# Patient Record
Sex: FEMALE | Race: WHITE | NOT HISPANIC OR LATINO | Employment: FULL TIME | URBAN - METROPOLITAN AREA
[De-identification: names, ages, dates, MRNs, and addresses within clinical notes are randomized per-mention and may not be internally consistent; named-entity substitution may affect disease eponyms.]

---

## 2017-03-02 ENCOUNTER — OFFICE VISIT (OUTPATIENT)
Dept: ORTHOPEDICS | Facility: CLINIC | Age: 32
End: 2017-03-02
Payer: COMMERCIAL

## 2017-03-02 ENCOUNTER — HOSPITAL ENCOUNTER (OUTPATIENT)
Dept: RADIOLOGY | Facility: HOSPITAL | Age: 32
Discharge: HOME | End: 2017-03-02
Attending: ORTHOPAEDIC SURGERY
Payer: COMMERCIAL

## 2017-03-02 VITALS — HEART RATE: 89 BPM | DIASTOLIC BLOOD PRESSURE: 102 MMHG | SYSTOLIC BLOOD PRESSURE: 151 MMHG | WEIGHT: 293 LBS

## 2017-03-02 DIAGNOSIS — M25.561 RIGHT KNEE PAIN, UNSPECIFIED CHRONICITY: Primary | ICD-10-CM

## 2017-03-02 DIAGNOSIS — M25.561 RIGHT KNEE PAIN, UNSPECIFIED CHRONICITY: ICD-10-CM

## 2017-03-02 PROCEDURE — 1160F RVW MEDS BY RX/DR IN RCRD: CPT | Mod: S$GLB,,, | Performed by: PHYSICIAN ASSISTANT

## 2017-03-02 PROCEDURE — 73560 X-RAY EXAM OF KNEE 1 OR 2: CPT | Mod: TC,59,LT

## 2017-03-02 PROCEDURE — 73562 X-RAY EXAM OF KNEE 3: CPT | Mod: 26,RT,, | Performed by: RADIOLOGY

## 2017-03-02 PROCEDURE — 73560 X-RAY EXAM OF KNEE 1 OR 2: CPT | Mod: 26,59,LT, | Performed by: RADIOLOGY

## 2017-03-02 PROCEDURE — 99999 PR PBB SHADOW E&M-EST. PATIENT-LVL III: CPT | Mod: PBBFAC,,, | Performed by: PHYSICIAN ASSISTANT

## 2017-03-02 PROCEDURE — 99203 OFFICE O/P NEW LOW 30 MIN: CPT | Mod: S$GLB,,, | Performed by: PHYSICIAN ASSISTANT

## 2017-03-02 RX ORDER — IBUPROFEN 400 MG/1
400 TABLET ORAL EVERY 4 HOURS
COMMUNITY
End: 2017-04-13

## 2017-03-02 NOTE — MR AVS SNAPSHOT
[facility]  [address]  [facility] [address]  Phone: [phone]                  Jojo Boateng   3/2/2017 10:00 AM   Appointment    Description:  Female : 1985   Provider:  Julee Santoyo PA-C   Department:  [facility]                To Do List           Future Appointments        Provider Department Dept Phone    3/2/2017 10:00 AM LAURO De La Rosa [facility] [phone]    3/6/2017 5:45 PM [facility] [phone]      Goals (5 Years of Data)     None      [facility]     [facility] Care [facility] -  Assistance  Registered nurses in the [facility] provide clinical advisement, health education, appointment booking, and other advisory services.  Call for this free service at [phone].             Medications           Message regarding Medications     Verify the changes and/or additions to your medication regime listed below are the same as discussed with your clinician today.  If any of these changes or additions are incorrect, please notify your healthcare provider.             Verify that the below list of medications is an accurate representation of the medications you are currently taking.  If none reported, the list may be blank. If incorrect, please contact your healthcare provider. Carry this list with you in case of emergency.                Clinical Reference Information           Allergies as of 3/2/2017     Ultram [Tramadol]      Immunizations Administered on Date of Encounter - 3/2/2017     None      [facility] Sign-Up     Activating your [facility] account is as easy as 1-2-3!     1) Visit [email or web address], select Sign Up Now, enter this activation code and your date of birth, then select Next.  [email or web address]  Expires: 2017  7:55 AM      2) Create a username and password to use when you visit [facility] in the future and select a security question in case you lose  your password and select Next.    3) Enter your e-mail address and click Sign Up!    Additional Information  If you have questions, please e-mail [email or web address] or call [phone] to talk to our [facility] staff. Remember, [facility] is NOT to be used for urgent needs. For medical emergencies, dial 911.         Language Assistance Services     ATTENTION: Language assistance services are available, free of charge. Please call [phone].      ATENCIÓN: Si habla español, tiene a cline disposición servicios gratuitos de asistencia lingüística. Llame al [phone].     CHÚ Ý: N?u b?n nói Ti?ng Vi?t, có các d?ch v? h? tr? ngôn ng? mi?n phí dành cho b?n. G?i s? [phone].         [facility] complies with applicable Federal civil rights laws and does not discriminate on the basis of race, color, national origin, age, disability, or sex.

## 2017-03-06 ENCOUNTER — HOSPITAL ENCOUNTER (OUTPATIENT)
Dept: RADIOLOGY | Facility: HOSPITAL | Age: 32
Discharge: HOME | End: 2017-03-06
Attending: NURSE PRACTITIONER
Payer: COMMERCIAL

## 2017-03-06 DIAGNOSIS — M25.561 RIGHT KNEE PAIN: ICD-10-CM

## 2017-03-06 PROCEDURE — 73721 MRI JNT OF LWR EXTRE W/O DYE: CPT | Mod: TC,RT

## 2017-03-06 PROCEDURE — 73721 MRI JNT OF LWR EXTRE W/O DYE: CPT | Mod: 26,RT,, | Performed by: RADIOLOGY

## 2017-03-07 ENCOUNTER — TELEPHONE (OUTPATIENT)
Dept: ORTHOPEDICS | Facility: CLINIC | Age: 32
End: 2017-03-07

## 2017-03-07 NOTE — TELEPHONE ENCOUNTER
Spoke to patient regarding MRI results. Will refer her to sports medicine. Appt scheduled with Dr. Barclay on 4/5/17.

## 2017-03-20 ENCOUNTER — OFFICE VISIT (OUTPATIENT)
Dept: INTERNAL MEDICINE | Facility: CLINIC | Age: 32
End: 2017-03-20
Payer: COMMERCIAL

## 2017-03-20 VITALS
TEMPERATURE: 98 F | WEIGHT: 293 LBS | OXYGEN SATURATION: 96 % | DIASTOLIC BLOOD PRESSURE: 91 MMHG | HEART RATE: 115 BPM | BODY MASS INDEX: 45.99 KG/M2 | SYSTOLIC BLOOD PRESSURE: 134 MMHG | HEIGHT: 67 IN

## 2017-03-20 DIAGNOSIS — Z20.828 EXPOSURE TO THE FLU: ICD-10-CM

## 2017-03-20 DIAGNOSIS — R50.9 FEVER, UNSPECIFIED FEVER CAUSE: Primary | ICD-10-CM

## 2017-03-20 LAB
CTP QC/QA: YES
CTP QC/QA: YES
FLUAV AG NPH QL: NEGATIVE
FLUBV AG NPH QL: NEGATIVE
S PYO RRNA THROAT QL PROBE: NEGATIVE

## 2017-03-20 PROCEDURE — 99213 OFFICE O/P EST LOW 20 MIN: CPT | Mod: 25,S$GLB,, | Performed by: NURSE PRACTITIONER

## 2017-03-20 PROCEDURE — 87070 CULTURE OTHR SPECIMN AEROBIC: CPT

## 2017-03-20 PROCEDURE — 99999 PR PBB SHADOW E&M-EST. PATIENT-LVL IV: CPT | Mod: PBBFAC,,, | Performed by: NURSE PRACTITIONER

## 2017-03-20 PROCEDURE — 87147 CULTURE TYPE IMMUNOLOGIC: CPT

## 2017-03-20 PROCEDURE — 87804 INFLUENZA ASSAY W/OPTIC: CPT | Mod: QW,S$GLB,, | Performed by: NURSE PRACTITIONER

## 2017-03-20 PROCEDURE — 1160F RVW MEDS BY RX/DR IN RCRD: CPT | Mod: S$GLB,,, | Performed by: NURSE PRACTITIONER

## 2017-03-20 PROCEDURE — 87880 STREP A ASSAY W/OPTIC: CPT | Mod: QW,S$GLB,, | Performed by: NURSE PRACTITIONER

## 2017-03-20 RX ORDER — HYDROXYZINE HYDROCHLORIDE 10 MG/1
TABLET, FILM COATED ORAL
COMMUNITY
Start: 2017-02-07 | End: 2017-04-13 | Stop reason: CLARIF

## 2017-03-20 RX ORDER — FLUTICASONE PROPIONATE 50 MCG
SPRAY, SUSPENSION (ML) NASAL
COMMUNITY
Start: 2017-02-02 | End: 2018-08-06

## 2017-03-20 RX ORDER — HYDROXYZINE PAMOATE 25 MG/1
CAPSULE ORAL
COMMUNITY
Start: 2017-02-07 | End: 2017-04-13 | Stop reason: CLARIF

## 2017-03-20 RX ORDER — PROMETHAZINE HYDROCHLORIDE 25 MG/1
12.5-25 TABLET ORAL EVERY 6 HOURS PRN
Qty: 10 TABLET | Refills: 0 | Status: SHIPPED | OUTPATIENT
Start: 2017-03-20 | End: 2017-04-13 | Stop reason: CLARIF

## 2017-03-20 RX ORDER — OSELTAMIVIR PHOSPHATE 75 MG/1
75 CAPSULE ORAL 2 TIMES DAILY
Qty: 10 CAPSULE | Refills: 0 | Status: SHIPPED | OUTPATIENT
Start: 2017-03-20 | End: 2017-03-25

## 2017-03-20 RX ORDER — HYDROCHLOROTHIAZIDE 25 MG/1
TABLET ORAL
COMMUNITY
Start: 2017-02-02 | End: 2018-08-06

## 2017-03-20 NOTE — MEDICAL/APP STUDENT
Subjective:       Patient ID: Jojo Boateng is a 31 y.o. female.    Chief Complaint: URI    Ms. Boateng presents to the clinic for flu like symptoms that started yesterday. 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 She states that she has never felt this bad in her entire life.      Influenza   This is a new problem. The current episode started yesterday. The problem has been rapidly worsening. Associated symptoms include chills, congestion, coughing, diaphoresis, fatigue, a fever, headaches, myalgias, nausea, a sore throat, swollen glands and vomiting. Pertinent negatives include no abdominal pain, change in bowel habit, chest pain, neck pain, rash or urinary symptoms. She has tried NSAIDs (Aleve for fever) for the symptoms. The treatment provided mild relief.     Review of Systems   Constitutional: Positive for chills, diaphoresis, fatigue and fever.   HENT: Positive for congestion, ear pain, rhinorrhea, sinus pressure and sore throat.    Respiratory: Positive for cough. Negative for chest tightness, shortness of breath and wheezing.    Cardiovascular: Negative for chest pain.   Gastrointestinal: Positive for nausea and vomiting. Negative for abdominal pain, change in bowel habit, constipation and diarrhea.   Genitourinary: Negative for difficulty urinating.   Musculoskeletal: Positive for myalgias. Negative for neck pain.   Skin: Negative for rash.   Neurological: Positive for headaches.       Objective:      Physical Exam   Constitutional: She is oriented to person, place, and time. She appears well-developed.   HENT:   Head: Normocephalic and atraumatic.   Right Ear: Tympanic membrane is erythematous.   Nose: Mucosal edema present. Right sinus exhibits no maxillary sinus tenderness and no frontal sinus tenderness. Left sinus exhibits no maxillary sinus tenderness and no frontal sinus tenderness.   Mouth/Throat: Posterior  oropharyngeal edema and posterior oropharyngeal erythema present. No oropharyngeal exudate. Tonsils are 1+ on the right. Tonsils are 1+ on the left. Tonsillar exudate.   Left ear impacted with cerumen; unable to visualize TM   Eyes: Right eye exhibits no discharge. Left eye exhibits no discharge.   Neck: Neck supple. No JVD present.   Cardiovascular: Regular rhythm.  Tachycardia present.    Pulmonary/Chest: Effort normal. She has wheezes in the right upper field.   Lymphadenopathy:     She has cervical adenopathy.   Neurological: She is alert and oriented to person, place, and time.   Skin: Skin is dry and intact. There is erythema.   Psychiatric: She has a normal mood and affect. Her behavior is normal. Judgment and thought content normal.       Assessment:       1. Fever, unspecified fever cause    2. Exposure to the flu        Plan:       Jojo was seen today for uri.    Diagnoses and all orders for this visit:    Fever, unspecified fever cause  -     POCT Influenza A/B  -     POCT rapid strep A  -     oseltamivir (TAMIFLU) 75 MG capsule; Take 1 capsule (75 mg total) by mouth 2 (two) times daily.  -     Throat culture    Exposure to the flu  -     oseltamivir (TAMIFLU) 75 MG capsule; Take 1 capsule (75 mg total) by mouth 2 (two) times daily.  -     promethazine (PHENERGAN) 25 MG tablet; Take 0.5-1 tablets (12.5-25 mg total) by mouth every 6 (six) hours as needed for Nausea.

## 2017-03-20 NOTE — MR AVS SNAPSHOT
[facility]  [address]  [facility] [address]  Phone: [phone]  Fax: [phone]                  Jojo Boateng   3/20/2017 4:30 PM   Office Visit    Description:  Female : 1985   Provider:  Priscilla Patiño NP   Department:  [facility] - Internal Medicine           Reason for Visit     URI           Diagnoses this Visit        Comments    Fever, unspecified fever cause    -  Primary     Exposure to the flu                To Do List           Future Appointments        Provider Department Dept Phone    2017 1:00 PM Radha Barclay MD [facility] [phone]      Goals (5 Years of Data)     None       These Medications        Disp Refills Start End    oseltamivir (TAMIFLU) 75 MG capsule 10 capsule 0 3/20/2017 3/25/2017    Take 1 capsule (75 mg total) by mouth 2 (two) times daily. - Oral    Pharmacy: [facility] [address] [place] [address] [address] Ph #: [phone]         [facility]     [facility] Nurse Care Line -  Assistance  Registered nurses in the [facility] On Call Center provide clinical advisement, health education, appointment booking, and other advisory services.  Call for this free service at [phone].             Medications           Message regarding Medications     Verify the changes and/or additions to your medication regime listed below are the same as discussed with your clinician today.  If any of these changes or additions are incorrect, please notify your healthcare provider.        START taking these NEW medications        Refills    oseltamivir (TAMIFLU) 75 MG capsule 0    Sig: Take 1 capsule (75 mg total) by mouth 2 (two) times daily.    Class: Normal    Route: Oral      STOP taking these medications     ACETAMINOPHEN WITH CODEINE (TYLENOL-CODEINE #3 ORAL) Take by mouth.           Verify that the below list of medications is an accurate representation of the medications you are currently  "taking.  If none reported, the list may be blank. If incorrect, please contact your healthcare provider. Carry this list with you in case of emergency.           Current Medications     fluticasone (FLONASE) 50 mcg/actuation nasal spray     hydrochlorothiazide (HYDRODIURIL) 25 MG tablet     hydrOXYzine HCl (ATARAX) 10 MG Tab     hydrOXYzine pamoate (VISTARIL) 25 MG Cap     ibuprofen (ADVIL,MOTRIN) 400 MG tablet Take 400 mg by mouth every 4 (four) hours.    oseltamivir (TAMIFLU) 75 MG capsule Take 1 capsule (75 mg total) by mouth 2 (two) times daily.           Clinical Reference Information           Your Vitals Were     BP Pulse Temp Height Weight Last Period    134/91 115 98.1 °F (36.7 °C) (Oral) 5' 7" (1.702 m) 146.8 kg (323 lb 10.2 oz) 12/21/2016    SpO2 BMI             96% 50.69 kg/m2         Blood Pressure          Most Recent Value    BP  (!)  134/91      Allergies as of 3/20/2017     Ultram [Tramadol]      Immunizations Administered on Date of Encounter - 3/20/2017     None      Orders Placed During Today's Visit      Normal Orders This Visit    POCT Influenza A/B     POCT rapid strep A     Throat culture          3/20/2017  4:37 PM - Jn Marti MA      Component Results     Component Value Flag Ref Range Units Status    Rapid Strep A Screen Negative  Negative  Final     Acceptable Yes    Final            [facility] Sign-Up     Activating your [facility] account is as easy as 1-2-3!     1) Visit [email or web address], select Sign Up Now, enter this activation code and your date of birth, then select Next.  [email or web address]  Expires: 4/16/2017  8:55 AM      2) Create a username and password to use when you visit [facility] in the future and select a security question in case you lose your password and select Next.    3) Enter your e-mail address and click Sign Up!    Additional Information  If you have questions, please e-mail [email or web address] or call [phone] to talk to our [facility] " staff. Remember, [facility] is NOT to be used for urgent needs. For medical emergencies, dial 911.         Language Assistance Services     ATTENTION: Language assistance services are available, free of charge. Please call [phone].      ATENCIÓN: Si habla briseyda, tiene a cline disposición servicios gratuitos de asistencia lingüística. Llame al [phone].     PETRONA Ý: N?u b?n nói Ti?ng Vi?t, có các d?ch v? h? tr? ngôn ng? mi?n phí dành cho b?n. G?i s? [phone].         [facility] complies with applicable Federal civil rights laws and does not discriminate on the basis of race, color, national origin, age, disability, or sex.

## 2017-03-20 NOTE — LETTER
March 20, 2017                   [facility]  Internal Medicine  [address]  [facility] [address]  Phone: [phone]  Fax: [phone]   March 20, 2017     Patient: Jojo Boateng   YOB: 1985   Date of Visit: 3/20/2017       To Whom it May Concern:    Jojo Boateng was seen in my clinic on 3/20/2017. She may return to work on 3/24/17.    If you have any questions or concerns, please don't hesitate to call.    Sincerely,         Priscilla Patiño, NP

## 2017-03-20 NOTE — PROGRESS NOTES
I attest that this patient was seen and evaluated by SHABNAM Steel, then presented to me. I then examined the patient independently and together we agreed on a diagnosis and treatment plan which was then presented to the patient. See her note dated today for full visit information.    Subjective:        Patient ID: Jojo Boateng is a 31 y.o. female.     Chief Complaint: URI     Ms. Boateng presents to the clinic for flu like symptoms that started yesterday. 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 She states that she has never felt this bad in her entire life.      Influenza   This is a new problem. The current episode started yesterday. The problem has been rapidly worsening. Associated symptoms include chills, congestion, coughing, diaphoresis, fatigue, a fever, headaches, myalgias, nausea, a sore throat, swollen glands and vomiting. Pertinent negatives include no abdominal pain, change in bowel habit, chest pain, neck pain, rash or urinary symptoms. She has tried NSAIDs (Aleve for fever) for the symptoms. The treatment provided mild relief.      Review of Systems   Constitutional: Positive for chills, diaphoresis, fatigue and fever.   HENT: Positive for congestion, ear pain, rhinorrhea, sinus pressure and sore throat.   Respiratory: Positive for cough. Negative for chest tightness, shortness of breath and wheezing.   Cardiovascular: Negative for chest pain.   Gastrointestinal: Positive for nausea and vomiting. Negative for abdominal pain, change in bowel habit, constipation and diarrhea.   Genitourinary: Negative for difficulty urinating.   Musculoskeletal: Positive for myalgias. Negative for neck pain.   Skin: Negative for rash.   Neurological: Positive for headaches.       Objective:      Physical Exam   Constitutional: She is oriented to person, place, and time. She appears well-developed.   HENT:   Head: Normocephalic  and atraumatic.   Right Ear: Tympanic membrane is erythematous.   Nose: Mucosal edema present. Right sinus exhibits no maxillary sinus tenderness and no frontal sinus tenderness. Left sinus exhibits no maxillary sinus tenderness and no frontal sinus tenderness.   Mouth/Throat: Posterior oropharyngeal edema and posterior oropharyngeal erythema present. No oropharyngeal exudate. Tonsils are 1+ on the right. Tonsils are 1+ on the left. Tonsillar exudate.   Left ear impacted with cerumen; unable to visualize TM   Eyes: Right eye exhibits no discharge. Left eye exhibits no discharge.   Neck: Neck supple. No JVD present.   Cardiovascular: Regular rhythm. Tachycardia present.   Pulmonary/Chest: Effort normal. She has wheezes in the right upper field.   Lymphadenopathy:   She has cervical adenopathy.   Neurological: She is alert and oriented to person, place, and time.   Skin: Skin is dry and intact. There is erythema.   Psychiatric: She has a normal mood and affect. Her behavior is normal. Judgment and thought content normal.       Assessment:       1. Fever, unspecified fever cause    2. Exposure to the flu        Plan:       Jojo was seen today for uri.     Diagnoses and all orders for this visit:     Fever, unspecified fever cause  - POCT Influenza A/B  - POCT rapid strep A  - oseltamivir (TAMIFLU) 75 MG capsule; Take 1 capsule (75 mg total) by mouth 2 (two) times daily.  - Throat culture     Exposure to the flu  - oseltamivir (TAMIFLU) 75 MG capsule; Take 1 capsule (75 mg total) by mouth 2 (two) times daily.  - promethazine (PHENERGAN) 25 MG tablet; Take 0.5-1 tablets (12.5-25 mg total) by mouth every 6 (six) hours as needed for Nausea.

## 2017-03-21 ENCOUNTER — TELEPHONE (OUTPATIENT)
Dept: INTERNAL MEDICINE | Facility: CLINIC | Age: 32
End: 2017-03-21

## 2017-03-21 RX ORDER — AMOXICILLIN 875 MG/1
875 TABLET, FILM COATED ORAL EVERY 12 HOURS
Qty: 20 TABLET | Refills: 0 | Status: SHIPPED | OUTPATIENT
Start: 2017-03-21 | End: 2017-03-27

## 2017-03-21 NOTE — TELEPHONE ENCOUNTER
----- Message from Emily Abdi sent at 3/21/2017  2:58 PM CDT -----  Contact: Self/ [phone]   Type: Test Results    What test was performed? Throat culture    Who ordered the test? Dr. Patiño     When and where were the test performed?  Yesterday     Comments: pt is calling to see if the results are in for the test. Please call and advise     Thank you

## 2017-03-24 LAB — BACTERIA THROAT CULT: NORMAL

## 2017-03-27 ENCOUNTER — TELEPHONE (OUTPATIENT)
Dept: INTERNAL MEDICINE | Facility: CLINIC | Age: 32
End: 2017-03-27

## 2017-03-27 ENCOUNTER — OFFICE VISIT (OUTPATIENT)
Dept: INTERNAL MEDICINE | Facility: CLINIC | Age: 32
End: 2017-03-27
Payer: COMMERCIAL

## 2017-03-27 VITALS
WEIGHT: 293 LBS | TEMPERATURE: 98 F | OXYGEN SATURATION: 98 % | DIASTOLIC BLOOD PRESSURE: 76 MMHG | BODY MASS INDEX: 45.99 KG/M2 | HEART RATE: 108 BPM | SYSTOLIC BLOOD PRESSURE: 134 MMHG | HEIGHT: 67 IN

## 2017-03-27 DIAGNOSIS — H66.003 ACUTE SUPPURATIVE OTITIS MEDIA OF BOTH EARS WITHOUT SPONTANEOUS RUPTURE OF TYMPANIC MEMBRANES, RECURRENCE NOT SPECIFIED: Primary | ICD-10-CM

## 2017-03-27 DIAGNOSIS — J02.0 PHARYNGITIS DUE TO GROUP A BETA HEMOLYTIC STREPTOCOCCI: ICD-10-CM

## 2017-03-27 PROCEDURE — 99213 OFFICE O/P EST LOW 20 MIN: CPT | Mod: S$GLB,,, | Performed by: NURSE PRACTITIONER

## 2017-03-27 PROCEDURE — 1160F RVW MEDS BY RX/DR IN RCRD: CPT | Mod: S$GLB,,, | Performed by: NURSE PRACTITIONER

## 2017-03-27 PROCEDURE — 99999 PR PBB SHADOW E&M-EST. PATIENT-LVL IV: CPT | Mod: PBBFAC,,, | Performed by: NURSE PRACTITIONER

## 2017-03-27 RX ORDER — OSELTAMIVIR PHOSPHATE 75 MG/1
75 CAPSULE ORAL
COMMUNITY
End: 2017-03-27

## 2017-03-27 RX ORDER — AMOXICILLIN AND CLAVULANATE POTASSIUM 875; 125 MG/1; MG/1
1 TABLET, FILM COATED ORAL EVERY 12 HOURS
Qty: 14 TABLET | Refills: 0 | Status: SHIPPED | OUTPATIENT
Start: 2017-03-27 | End: 2017-04-13 | Stop reason: CLARIF

## 2017-03-27 NOTE — MR AVS SNAPSHOT
[facility] - Internal Medicine  [address]  [facility] [address]  Phone: [phone]  Fax: [phone]                  Jojo Boateng   3/27/2017 7:00 PM   Office Visit    Description:  Female : 1985   Provider:  Priscilla Patiño NP   Department:  [facility] - Internal Medicine           Reason for Visit     Ear Problem           Diagnoses this Visit        Comments    Acute suppurative otitis media of both ears without spontaneous rupture of tympanic membranes, recurrence not specified    -  Primary     Pharyngitis due to group A beta hemolytic Streptococci                To Do List           Future Appointments        Provider Department Dept Phone    3/27/2017 7:00 PM Priscilla Patiño NP [facility] Internal Medicine [phone]    2017 1:00 PM Radha Barclay MD [facility] - Sports Medicine [phone]      Goals (5 Years of Data)     None       These Medications        Disp Refills Start End    amoxicillin-clavulanate 875-125mg (AUGMENTIN) 875-125 mg per tablet 14 tablet 0 3/27/2017     Take 1 tablet by mouth every 12 (twelve) hours. - Oral    Pharmacy: [facility] [address] [place] [address] [address] Ph #: [phone]         [facility]     [facility] On Call Nurse Care Line -  Assistance  Registered nurses in the [facility] On Call Center provide clinical advisement, health education, appointment booking, and other advisory services.  Call for this free service at [phone].             Medications           Message regarding Medications     Verify the changes and/or additions to your medication regime listed below are the same as discussed with your clinician today.  If any of these changes or additions are incorrect, please notify your healthcare provider.        START taking these NEW medications        Refills    amoxicillin-clavulanate 875-125mg (AUGMENTIN) 875-125 mg per tablet 0    Sig: Take 1 tablet by mouth every 12 (twelve) hours.    Class: Normal  "   Route: Oral      STOP taking these medications     amoxicillin (AMOXIL) 875 MG tablet Take 1 tablet (875 mg total) by mouth every 12 (twelve) hours.    oseltamivir (TAMIFLU) 75 MG capsule Take 75 mg by mouth.           Verify that the below list of medications is an accurate representation of the medications you are currently taking.  If none reported, the list may be blank. If incorrect, please contact your healthcare provider. Carry this list with you in case of emergency.           Current Medications     amoxicillin-clavulanate 875-125mg (AUGMENTIN) 875-125 mg per tablet Take 1 tablet by mouth every 12 (twelve) hours.    fluticasone (FLONASE) 50 mcg/actuation nasal spray     hydrochlorothiazide (HYDRODIURIL) 25 MG tablet     hydrOXYzine HCl (ATARAX) 10 MG Tab     hydrOXYzine pamoate (VISTARIL) 25 MG Cap     ibuprofen (ADVIL,MOTRIN) 400 MG tablet Take 400 mg by mouth every 4 (four) hours.    promethazine (PHENERGAN) 25 MG tablet Take 0.5-1 tablets (12.5-25 mg total) by mouth every 6 (six) hours as needed for Nausea.           Clinical Reference Information           Your Vitals Were     BP Pulse Temp Height Weight Last Period    134/76 108 98.3 °F (36.8 °C) (Oral) 5' 7" (1.702 m) 145.7 kg (321 lb 3.4 oz) 12/21/2016    SpO2 BMI             98% 50.31 kg/m2         Blood Pressure          Most Recent Value    BP  134/76      Allergies as of 3/27/2017     Ultram [Tramadol]      Immunizations Administered on Date of Encounter - 3/27/2017     None      [facility] Sign-Up     Activating your [facility] account is as easy as 1-2-3!     1) Visit [email or web address], select Sign Up Now, enter this activation code and your date of birth, then select Next.  [email or web address]  Expires: 4/16/2017  8:55 AM      2) Create a username and password to use when you visit [facility] in the future and select a security question in case you lose your password and select Next.    3) Enter your e-mail address and click Sign Up!    Additional " Information  If you have questions, please e-mail [email or web address] or call [phone] to talk to our [facility] staff. Remember, [facility] is NOT to be used for urgent needs. For medical emergencies, dial 911.         Language Assistance Services     ATTENTION: Language assistance services are available, free of charge. Please call [phone].      ATENCIÓN: Si habla español, tiene a cline disposición servicios gratuitos de asistencia lingüística. Llame al [phone].     [facility] Ý: N?u b?n nói Ti?ng Vi?t, có các d?ch v? h? tr? ngôn ng? mi?n phí dành cho b?n. G?i s? [phone].         [facility] complies with applicable Federal civil rights laws and does not discriminate on the basis of race, color, national origin, age, disability, or sex.

## 2017-03-27 NOTE — TELEPHONE ENCOUNTER
Please let Ms Boateng know they culture did come back positive for strep.  The amoxicillin she started last week should be effective.  She should take all of it as prescribed.

## 2017-03-27 NOTE — PROGRESS NOTES
Subjective:       Patient ID: Jojo Boateng is a 31 y.o. female.    Chief Complaint: Ear Problem    Ms Boateng presents today for ongoing ear pain, pressure and diminished hearing, all on the left side, since Friday. She is on Amoxil 875 BID for strep throat and has been using Flonase for allergies.     Otalgia    There is pain in the left ear. This is a new problem. The current episode started in the past 7 days. The problem occurs constantly. The problem has been gradually worsening. The maximum temperature recorded prior to her arrival was 101 - 101.9 F. The fever has been present for 1 to 2 days. The patient is experiencing no pain. Associated symptoms include headaches, hearing loss and a sore throat. Pertinent negatives include no abdominal pain, coughing, diarrhea, ear discharge, neck pain, rash, rhinorrhea or vomiting. She has tried antibiotics for the symptoms. The treatment provided no relief.     Review of Systems   Constitutional: Positive for fatigue and fever. Negative for chills and diaphoresis.   HENT: Positive for ear pain, hearing loss and sore throat. Negative for ear discharge and rhinorrhea.    Eyes: Negative for visual disturbance.   Respiratory: Negative for cough.    Gastrointestinal: Negative for abdominal pain, diarrhea and vomiting.   Genitourinary: Negative for dysuria.   Musculoskeletal: Negative for neck pain.   Skin: Negative for rash.   Neurological: Positive for headaches.   Psychiatric/Behavioral: Negative for confusion.       Objective:      Physical Exam   Constitutional: She is oriented to person, place, and time. She appears well-developed. No distress.   Morbidly obese   HENT:   Head: Normocephalic and atraumatic.   Right Ear: Tympanic membrane is erythematous. A middle ear effusion is present.   Left Ear: Tympanic membrane is erythematous and bulging. A middle ear effusion is present.   Nose: No mucosal edema or rhinorrhea. Right sinus exhibits no maxillary sinus tenderness  and no frontal sinus tenderness. Left sinus exhibits no maxillary sinus tenderness and no frontal sinus tenderness.   Mouth/Throat: No oropharyngeal exudate. Tonsils are 0 on the right. Tonsils are 0 on the left. No tonsillar exudate.   Neck: Normal range of motion. Neck supple.   Cardiovascular: Normal rate, regular rhythm and normal heart sounds.    Pulmonary/Chest: Effort normal and breath sounds normal. No respiratory distress. She has no wheezes. She has no rales.   Lymphadenopathy:     She has no cervical adenopathy.   Neurological: She is alert and oriented to person, place, and time.   Skin: Skin is warm and dry. She is not diaphoretic.   Psychiatric: She has a normal mood and affect. Her behavior is normal.   Nursing note and vitals reviewed.      Assessment:       1. Acute suppurative otitis media of both ears without spontaneous rupture of tympanic membranes, recurrence not specified    2. Pharyngitis due to group A beta hemolytic Streptococci        Plan:   1. Acute suppurative otitis media of both ears without spontaneous rupture of tympanic membranes, recurrence not specified  - amoxicillin-clavulanate 875-125mg (AUGMENTIN) 875-125 mg per tablet; Take 1 tablet by mouth every 12 (twelve) hours.  Dispense: 14 tablet; Refill: 0    2. Pharyngitis due to group A beta hemolytic Streptococci  - amoxicillin-clavulanate 875-125mg (AUGMENTIN) 875-125 mg per tablet; Take 1 tablet by mouth every 12 (twelve) hours.  Dispense: 14 tablet; Refill: 0      Pt has been given instructions populated from [employer] database and has verbalized understanding of the after visit summary and information contained wherein.    Follow up with a primary care provider. May go to ER for acute shortness of breath, lightheadedness, fever, or any other emergent complaints or changes in condition.

## 2017-04-05 ENCOUNTER — OFFICE VISIT (OUTPATIENT)
Dept: SPORTS MEDICINE | Facility: CLINIC | Age: 32
End: 2017-04-05
Payer: COMMERCIAL

## 2017-04-05 VITALS
SYSTOLIC BLOOD PRESSURE: 145 MMHG | BODY MASS INDEX: 45.99 KG/M2 | WEIGHT: 293 LBS | HEIGHT: 67 IN | DIASTOLIC BLOOD PRESSURE: 98 MMHG | HEART RATE: 107 BPM

## 2017-04-05 DIAGNOSIS — S83.8X1A ACUTE LATERAL MENISCAL INJURY OF RIGHT KNEE, INITIAL ENCOUNTER: ICD-10-CM

## 2017-04-05 DIAGNOSIS — M23.91 KNEE INTERNAL DERANGEMENT, RIGHT: Primary | ICD-10-CM

## 2017-04-05 DIAGNOSIS — M23.91 DERANGEMENT, KNEE INTERNAL, RIGHT: ICD-10-CM

## 2017-04-05 DIAGNOSIS — M94.261 CHONDROMALACIA OF RIGHT KNEE: ICD-10-CM

## 2017-04-05 DIAGNOSIS — S83.281A TEAR OF LATERAL CARTILAGE OR MENISCUS OF KNEE, CURRENT, RIGHT, INITIAL ENCOUNTER: Primary | ICD-10-CM

## 2017-04-05 PROCEDURE — 1160F RVW MEDS BY RX/DR IN RCRD: CPT | Mod: S$GLB,,, | Performed by: ORTHOPAEDIC SURGERY

## 2017-04-05 PROCEDURE — 99204 OFFICE O/P NEW MOD 45 MIN: CPT | Mod: S$GLB,,, | Performed by: ORTHOPAEDIC SURGERY

## 2017-04-05 PROCEDURE — 99999 PR PBB SHADOW E&M-EST. PATIENT-LVL IV: CPT | Mod: PBBFAC,,, | Performed by: ORTHOPAEDIC SURGERY

## 2017-04-05 RX ORDER — LORATADINE 10 MG/1
10 TABLET ORAL DAILY
COMMUNITY
End: 2018-08-06

## 2017-04-05 NOTE — PROGRESS NOTES
Subjective:          Chief Complaint: Jojo Boateng is a 31 y.o. female who had concerns including Pain of the Right Knee.    HPI Comments: Patient is here for an evaluation of her right knee. She was squatting at [employer] and felt a pop. She has had increased pain and swelling since then. She was seen by Julee Santoyo and had an MRI.     Handedness: right-handed  Sport played:    Level:            Pain   This is a new problem. The current episode started more than 1 month ago. Pertinent negatives include no chest pain, chills, diaphoresis, joint swelling, myalgias, nausea, numbness, rash, vomiting or weakness.       Review of Systems   Constitution: Negative for chills, decreased appetite, diaphoresis, weakness, malaise/fatigue, night sweats, weight gain and weight loss.   Eyes: Negative for blurred vision, double vision and pain.   Cardiovascular: Negative for chest pain, cyanosis, dyspnea on exertion, leg swelling, orthopnea and palpitations.   Respiratory: Negative for hemoptysis, shortness of breath and wheezing.    Skin: Negative for color change and rash.   Musculoskeletal: Positive for joint pain. Negative for arthritis, back pain, falls, gout, joint swelling, muscle cramps, muscle weakness, myalgias and stiffness.   Gastrointestinal: Negative for hematemesis, hematochezia, melena, nausea and vomiting.   Genitourinary: Negative for dysuria, frequency and hematuria.   Neurological: Negative for dizziness, light-headedness, loss of balance and numbness.   Psychiatric/Behavioral: Negative for altered mental status, depression and memory loss. The patient does not have insomnia and is not nervous/anxious.        Pain Related Questions  Over the past 3 days, what was your average pain during activity? (I.e. running, jogging, walking, climbing stairs, getting dressed, ect.): 7  Over the past 3 days, what was your highest pain level?: 7  Over the past 3 days, what was your lowest pain level? : 7    Other  How  many nights a week are you awakened by your affected body part?: 2  Was the patient's HEIGHT measured or patient reported?: Patient Reported  Was the patient's WEIGHT measured or patient reported?: Measured      Objective:        General: Jojo is well-developed, well-nourished, appears stated age, in no acute distress, alert and oriented to time, place and person.     General    Vitals reviewed.  Constitutional: She is oriented to person, place, and time. She appears well-developed and well-nourished. No distress.   HENT:   Mouth/Throat: No oropharyngeal exudate.   Eyes: Right eye exhibits no discharge. Left eye exhibits no discharge.   Neck: Normal range of motion.   Pulmonary/Chest: Effort normal and breath sounds normal. No respiratory distress.   Neurological: She is alert and oriented to person, place, and time. She has normal reflexes. No cranial nerve deficit. Coordination normal.   Psychiatric: She has a normal mood and affect. Her behavior is normal. Judgment and thought content normal.     General Musculoskeletal Exam   Gait: antalgic and abnormal       Right Knee Exam     Inspection   Erythema: absent  Scars: absent  Swelling: present  Effusion: effusion  Deformity: deformity  Bruising: absent    Tenderness   The patient is tender to palpation of the lateral joint line.    Range of Motion   Extension: 0   Flexion: 90     Tests   Meniscus   Agapito:  Medial - negative Lateral - positive  Ligament Examination Lachman: normal (-1 to 2mm) PCL-Posterior Drawer: normal (0 to 2mm)     MCL - Valgus: normal (0 to 2mm)  LCL - Varus: normalPivot Shift: normal (Equal)Reverse Pivot Shift: normal (Equal)Dial Test at 30 degrees: normal (< 5 degrees)Dial Test at 90 degrees: normal (< 5 degrees)  Posterior Sag Test: negative  Posterolateral Corner: unstable (>15 degrees difference)  Patella   Patellar Apprehension: negative  Passive Patellar Tilt: neutral  Patellar Tracking: normal  Patellar Glide (quadrants): Lateral  - 1   Medial - 2  Q-Angle at 90 degrees: normal  Patellar Grind: negative  J-Sign: none    Other   Meniscal Cyst: absent  Popliteal (Baker's) Cyst: absent  Sensation: normal    Left Knee Exam     Inspection   Erythema: absent  Scars: absent  Swelling: absent  Effusion: absent  Deformity: deformity  Bruising: absent    Tenderness   The patient is experiencing no tenderness.         Range of Motion   Extension: 0   Flexion: 140     Tests   Meniscus   Agapito:  Medial - negative Lateral - negative  Stability Lachman: normal (-1 to 2mm) PCL-Posterior Drawer: normal (0 to 2mm)  MCL - Valgus: normal (0 to 2mm)  LCL - Varus: normal (0 to 2mm)Pivot Shift: normal (Equal)Reverse Pivot Shift: normal (Equal)Dial Test at 30 degrees: normal (< 5 degrees)Dial Test at 90 degrees: normal (< 5 degrees)  Posterior Sag Test: negative  Posterolateral Corner: unstable (>15 degrees difference)  Patella   Patellar Apprehension: negative  Passive Patellar Tilt: neutral  Patellar Tracking: normal  Patellar Glide (Quadrants): Lateral - 1 Medial - 2  Q-Angle at 90 degrees: normal  Patellar Grind: negative  J-Sign: J sign absent    Other   Meniscal Cyst: absent  Popliteal (Baker's) Cyst: absent  Sensation: normal    Right Hip Exam     Tests   Trupti: negative  Left Hip Exam     Tests   Trupti: negative          Reflexes     Left Side  Quadriceps:  2+  Achilles:  2+    Right Side   Quadriceps:  2+  Achilles:  2+    Vascular Exam     Right Pulses  Dorsalis Pedis:      2+  Posterior Tibial:      2+        Left Pulses  Dorsalis Pedis:      2+  Posterior Tibial:      2+            MRI RESULTS  Menisci:  Medial meniscus is intact.  There is truncation of the free edge of the body segment of the lateral meniscus with mild increased signal extending to the superior articular surface suggesting meniscal tear.     Ligaments:  ACL, PCL, MCL, and LCL complex are intact.    Tendons:  Extensor mechanism is maintained.    Cartilage:   Patellofemoral: Minimal  partial-thickness cartilage fissuring visualized over the median patellar eminence and central trochlear cartilage.  Medial tibiofemoral: Articular cartilage is maintained.  Lateral tibiofemoral: Small area of partial to full-thickness cartilage loss involving the central weightbearing portion of the lateral femoral condyle with mild underlying subchondral edema.    Bone: No fracture or marrow replacing process.  Mild marginal osteophytes involving the lateral compartment.    Miscellaneous: There is no joint effusion.        Assessment:       Encounter Diagnoses   Name Primary?    Knee internal derangement, right Yes    Acute lateral meniscal injury of right knee, initial encounter     Chondromalacia of right knee           Plan:         1. IKDC, SF-12 and KOOS was filled out today in clinic.     RTC in 3 weeks with Mid-level provider for pre-operative history and physical. Patient will not fill out IKDC, SF-12 and KOOS on return.    2. We reviewed with Jojo today, the pathology and natural history of her diagnosis. We have discussed a variety of treatment options including medications, physical therapy and other alternative treatments. I also explained the indications, risks and benefits of surgery. After discussion, Jojo decided to proceed with surgery. The decision was made to go forward with   1. Right knee lateral menisectomy   2. Right knee arthroscopic chondroplasty  3. Right knee arthroscopic synovectomy  4. Amniox Arthrocentesis, [id number]    The details of the surgical procedure were explained, including the location of probable incisions and a description of likely hardware and/or grafts to be used.  The patient understands the likely convalescence after surgery.  Also, we have thoroughly discussed the risks, benefits and alternatives to surgery, including, but not limited to, the risk of infection, joint stiffness, blood clot (including DVT and/or pulmonary embolus), neurologic and vascular injury.   It was explained that, if tissue has been repaired or reconstructed, there is a chance of failure, which may require further management.      All of the patient's questions were answered and informed consent was obtained. The patient will contact us if they have any questions or concerns in the interim.    3. Pre-operative physical therapy - Dea Termin - DPT    4. 70021 - Idris Gates, performed a custom orthotic / brace adjustment, fitting and training with the patient. The patient demonstrated understanding and proper care. This was performed for 15 minutes. OSSUR  one plus Right knee    5. Preoperative clearance Dr. Natanael Elias patient questionnaires have been collected today.

## 2017-04-05 NOTE — MR AVS SNAPSHOT
[facility]  [address]  [facility] [address]  Phone: [phone]                  Jojo Boateng   2017 1:00 PM   Office Visit    Description:  Female : 1985   Provider:  Radha Barclay MD   Department:  [facility]           Reason for Visit     Right Knee - Pain           Diagnoses this Visit        Comments    Knee internal derangement, right    -  Primary     Acute lateral meniscal injury of right knee, initial encounter         Chondromalacia of right knee                To Do List           Goals (5 Years of Data)     None      Follow-Up and Disposition     Return in about 3 weeks (around 2017), or RTC in 3 weeks with Mid-level provider for pre-operative history and physical. Patient will not fill, for RTC in 3 weeks with Mid-level provider for pre-operative history and physical. Patient will not fill.      [facility] On Call     [facility] On Call Nurse Care Line -  Assistance  Unless otherwise directed by your provider, please contact [facility] On-Call, our nurse care line that is available for  assistance.     Registered nurses in the [facility] On Call Center provide: appointment scheduling, clinical advisement, health education, and other advisory services.  Call: [phone] (toll free)               Medications           Message regarding Medications     Verify the changes and/or additions to your medication regime listed below are the same as discussed with your clinician today.  If any of these changes or additions are incorrect, please notify your healthcare provider.             Verify that the below list of medications is an accurate representation of the medications you are currently taking.  If none reported, the list may be blank. If incorrect, please contact your healthcare provider. Carry this list with you in case of emergency.           Current Medications     fluticasone (FLONASE) 50 mcg/actuation nasal spray      "hydrochlorothiazide (HYDRODIURIL) 25 MG tablet     ibuprofen (ADVIL,MOTRIN) 400 MG tablet Take 400 mg by mouth every 4 (four) hours.    loratadine (CLARITIN) 10 mg tablet Take 10 mg by mouth once daily.    amoxicillin-clavulanate 875-125mg (AUGMENTIN) 875-125 mg per tablet Take 1 tablet by mouth every 12 (twelve) hours.    hydrOXYzine HCl (ATARAX) 10 MG Tab     hydrOXYzine pamoate (VISTARIL) 25 MG Cap     promethazine (PHENERGAN) 25 MG tablet Take 0.5-1 tablets (12.5-25 mg total) by mouth every 6 (six) hours as needed for Nausea.           Clinical Reference Information           Your Vitals Were     BP Pulse Height Weight Last Period BMI    145/98 107 5' 7" (1.702 m) 145.6 kg (321 lb) 12/21/2016 50.28 kg/m2      Blood Pressure          Most Recent Value    BP  (!)  145/98      Allergies as of 4/5/2017     Ultram [Tramadol]      Immunizations Administered on Date of Encounter - 4/5/2017     None      Orders Placed During Today's Visit      Normal Orders This Visit    Ambulatory Referral to Physical/Occupational Therapy       [facility] Sign-Up     Activating your [facility] account is as easy as 1-2-3!     1) Visit [email or web address], select Sign Up Now, enter this activation code and your date of birth, then select Next.  [email or web address]  Expires: 4/16/2017  8:55 AM      2) Create a username and password to use when you visit [facility] in the future and select a security question in case you lose your password and select Next.    3) Enter your e-mail address and click Sign Up!    Additional Information  If you have questions, please e-mail [email or web address] or call [phone] to talk to our [facility] staff. Remember, [facility] is NOT to be used for urgent needs. For medical emergencies, dial 911.         Instructions      Treating Meniscus Problems  The type of surgery you have depends on the nature of your tear. its size and location. Your surgeon may use arthroscopy, a method that involves putting a tiny camera " inside your knee, so that your doctor can clearly see your joint. Arthroscopy requires only small incisions (cuts), and you can usually go home the same day as surgery. During surgery, you may have local anesthesia (your doctor numbs your knee with medicine), a regional block (numbing your body from the waist down), or general anesthesia (your doctor gives you drugs to put you into a deep sleep so you do't feel pain.)  Pre-op checklist  · Don't eat or drink 10 hours before surgery.  · Arrange for someone to drive you home after surgery.  · Tell your doctor if you take any medicine, supplements, or herbal remedies.        Repair  For certain tears, your surgeon will try to repair the meniscus. He or she will sew torn edges so they can heal properly. Or your surgeon will use special fasteners to repair damage. In some cases, repairs may require another incision at the back or side of your knee.       Removal  In most cases, your surgeon will remove the damaged part of your meniscus. The meniscus won't completely grow back, so your doctor will remove as little tissue as possible. Other tissue, called the articular cartilage, will take over the role as shock absorber for your knee joint.       After surgery  You'll spend some time in the recovery area and can go home when you've recovered from the anesthesia. Your knee will be bandaged, and you may have stitches, steri-strips, or staples. You may need crutches to keep weight off the knee and may have a splint for support.  Date Last Reviewed: 9/4/2015 © 2000-2016 [employer]. [address] [place] [address], [place] [address]. All rights reserved. This information is not intended as a substitute for professional medical care. Always follow your healthcare professional's instructions.        Knee Arthroscopy  Knee problems can often be diagnosed and treated with a technique called arthroscopy. This type of surgery is done using an instrument called an arthroscope  (scope). Only a few small incisions are needed for this surgery. The procedure can be used to diagnose a knee problem. In many cases, treatment can also be done using arthroscopy.     Insertion of fluid, arthroscope, and instruments through small incisions (portals).         Patient undergoes procedure      The arthroscope  The scope allows the doctor to look directly into the knee joint. It is about the size of a pencil and contains a pathway for fluids. It also contains coated glass fibers that beam an intense, cool light into the knee joint. A camera is attached to the scope as well. It provides clear images of most areas in your knee joint. The doctor views these images on a monitor.  Preparing for the procedure  · Have lab or other testing done as advised.  · Tell your doctor about any medicines or supplements you take  · Do not eat or drink anything for 10 hours before the procedure.  · Once you arrive for surgery, you will be given an IV line in your arm or hand. This provides fluids and medicines.  · To keep you free of pain during the surgery, youll receive medicine called anesthesia. You may have:  ¨ General anesthesia. This puts you into a deep sleep during the surgery.  ¨ Regional anesthesia. This numbs the body from the waist down.  ¨ Local anesthesia. This numbs just the knee.  In addition to regional or local anesthesia, you may receive sedation. This medicine makes you relaxed and sleepy during the surgery.  The procedure  · A few small incisions (portals) are made in your knee.  · The scope is inserted through one of the portals.  · Sterile fluid is put into the knee joint. This makes it easier to see and work inside your joint.  · Using the scope, the doctor confirms the type and degree of knee damage. If possible, the problem is treated at this time. This is done using surgical tools put through the other portals.  · When the surgery is done, all tools are removed. The incisions are closed with  sutures, staples, surgical glue, or strips of surgical tape.  Risks and complications of arthroscopy  All surgeries have risks. The risks of arthroscopy include:  · Bleeding  · Infection  · Blood clots  · Swelling and stiffness of the knee  · Injury to normal tissue  · Continuing knee problems   Date Last Reviewed: 9/20/2015  © 1859-5557 [employer]. [address] [place] [address]. All rights reserved. This information is not intended as a substitute for professional medical care. Always follow your healthcare professional's instructions.        Knee Arthroscopy: Conditions Treated  Arthroscopy is used to find and treat many types of knee problems. These include tears in the meniscal cartilage, joint loose bodies, or anterior cruciate ligament (ACL) tears.     Damaged meniscal cartilage is removed.   Meniscal cartilage tears  There are several types of meniscal cartilage tears. The meniscal cartilage attaches on the tibia (shinbone) and acts as a shock absorber for the knee. Your surgery will depend on the type and extent of your injury. Your surgeon can remove the damaged tissue or fix the tear. Treatment should ease the pain and swelling. It can also help keep the joint from locking.     To replace damaged ACL tissue, a graft of strong, healthy tissue is attached.   ACL tears  A torn ACL (anterior cruciate ligament) can make the knee unstable. You may have pain and swelling, and your knee may give out. Your surgeon can repair the ACL by reconstructing it. To rebuild your ACL, damaged tissue may be replaced with a graft of healthy tissue from an area near your knee, or from a donor.     Date Last Reviewed: 8/31/2015 © 2000-2016 [employer]. [address] [place] [address]. All rights reserved. This information is not intended as a substitute for professional medical care. Always follow your healthcare professional's instructions.             Language Assistance  Services     ATTENTION: Language assistance services are available, free of charge. Please call [phone].      ATENCIÓN: Si habla briseyda, tiene a cline disposición servicios gratuitos de asistencia lingüística. Llame al [phone].     CHÚ Ý: N?u b?n nói Ti?ng Vi?t, có các d?ch v? h? tr? ngôn ng? mi?n phí dành cho b?n. G?i s? [phone].         [facility] complies with applicable Federal civil rights laws and does not discriminate on the basis of race, color, national origin, age, disability, or sex.

## 2017-04-05 NOTE — LETTER
April 5, 2017        Julee Santoyo PA-C  [address]  [facility] [address]             [facility]  [address]  [facility] [address]  Phone: [phone]   Patient: Jojo Boateng   MR Number: [id number]   YOB: 1985   Date of Visit: 4/5/2017       Dear Dr. Santoyo:    Thank you for referring Jojo Boateng to me for evaluation. Below are the relevant portions of my assessment and plan of care.       Encounter Diagnoses   Name Primary?    Knee internal derangement, right Yes    Acute lateral meniscal injury of right knee, initial encounter     Chondromalacia of right knee              1. IKDC, SF-12 and KOOS was filled out today in clinic.     RTC in 3 weeks with Mid-level provider for pre-operative history and physical. Patient will not fill out IKDC, SF-12 and KOOS on return.    2. We reviewed with Jojo today, the pathology and natural history of her diagnosis. We have discussed a variety of treatment options including medications, physical therapy and other alternative treatments. I also explained the indications, risks and benefits of surgery. After discussion, Jojo decided to proceed with surgery. The decision was made to go forward with   1. Right knee lateral menisectomy   2. Right knee arthroscopic chondroplasty  3. Right knee arthroscopic synovectomy  4. Amniox Arthrocentesis, 08182    The details of the surgical procedure were explained, including the location of probable incisions and a description of likely hardware and/or grafts to be used.  The patient understands the likely convalescence after surgery.  Also, we have thoroughly discussed the risks, benefits and alternatives to surgery, including, but not limited to, the risk of infection, joint stiffness, blood clot (including DVT and/or pulmonary embolus), neurologic and vascular injury.  It was explained that, if tissue has been repaired or reconstructed, there is a chance of failure,  which may require further management.      All of the patient's questions were answered and informed consent was obtained. The patient will contact us if they have any questions or concerns in the interim.    3. Pre-operative physical therapy - Dea Termin - DPT    4. [address]85 - Idris Gates, performed a custom orthotic / brace adjustment, fitting and training with the patient. The patient demonstrated understanding and proper care. This was performed for 15 minutes. OSSUR  one plus Right knee    5. Preoperative clearance Dr. Santoyo             If you have questions, please do not hesitate to call me. I look forward to following Jojo along with you.    Sincerely,      MD SHERI Hurley

## 2017-04-05 NOTE — PATIENT INSTRUCTIONS
Treating Meniscus Problems  The type of surgery you have depends on the nature of your tear. its size and location. Your surgeon may use arthroscopy, a method that involves putting a tiny camera inside your knee, so that your doctor can clearly see your joint. Arthroscopy requires only small incisions (cuts), and you can usually go home the same day as surgery. During surgery, you may have local anesthesia (your doctor numbs your knee with medicine), a regional block (numbing your body from the waist down), or general anesthesia (your doctor gives you drugs to put you into a deep sleep so you do't feel pain.)  Pre-op checklist  · Don't eat or drink 10 hours before surgery.  · Arrange for someone to drive you home after surgery.  · Tell your doctor if you take any medicine, supplements, or herbal remedies.        Repair  For certain tears, your surgeon will try to repair the meniscus. He or she will sew torn edges so they can heal properly. Or your surgeon will use special fasteners to repair damage. In some cases, repairs may require another incision at the back or side of your knee.       Removal  In most cases, your surgeon will remove the damaged part of your meniscus. The meniscus won't completely grow back, so your doctor will remove as little tissue as possible. Other tissue, called the articular cartilage, will take over the role as shock absorber for your knee joint.       After surgery  You'll spend some time in the recovery area and can go home when you've recovered from the anesthesia. Your knee will be bandaged, and you may have stitches, steri-strips, or staples. You may need crutches to keep weight off the knee and may have a splint for support.  Date Last Reviewed: 9/4/2015  © 4774-1081 [employer]. [address] [place] [address], [place] [address]. All rights reserved. This information is not intended as a substitute for professional medical care. Always follow your healthcare professional's  instructions.        Knee Arthroscopy  Knee problems can often be diagnosed and treated with a technique called arthroscopy. This type of surgery is done using an instrument called an arthroscope (scope). Only a few small incisions are needed for this surgery. The procedure can be used to diagnose a knee problem. In many cases, treatment can also be done using arthroscopy.     Insertion of fluid, arthroscope, and instruments through small incisions (portals).         Patient undergoes procedure      The arthroscope  The scope allows the doctor to look directly into the knee joint. It is about the size of a pencil and contains a pathway for fluids. It also contains coated glass fibers that beam an intense, cool light into the knee joint. A camera is attached to the scope as well. It provides clear images of most areas in your knee joint. The doctor views these images on a monitor.  Preparing for the procedure  · Have lab or other testing done as advised.  · Tell your doctor about any medicines or supplements you take  · Do not eat or drink anything for 10 hours before the procedure.  · Once you arrive for surgery, you will be given an IV line in your arm or hand. This provides fluids and medicines.  · To keep you free of pain during the surgery, youll receive medicine called anesthesia. You may have:  ¨ General anesthesia. This puts you into a deep sleep during the surgery.  ¨ Regional anesthesia. This numbs the body from the waist down.  ¨ Local anesthesia. This numbs just the knee.  In addition to regional or local anesthesia, you may receive sedation. This medicine makes you relaxed and sleepy during the surgery.  The procedure  · A few small incisions (portals) are made in your knee.  · The scope is inserted through one of the portals.  · Sterile fluid is put into the knee joint. This makes it easier to see and work inside your joint.  · Using the scope, the doctor confirms the type and degree of knee damage. If  possible, the problem is treated at this time. This is done using surgical tools put through the other portals.  · When the surgery is done, all tools are removed. The incisions are closed with sutures, staples, surgical glue, or strips of surgical tape.  Risks and complications of arthroscopy  All surgeries have risks. The risks of arthroscopy include:  · Bleeding  · Infection  · Blood clots  · Swelling and stiffness of the knee  · Injury to normal tissue  · Continuing knee problems   Date Last Reviewed: 9/20/2015  © 5967-8804 [employer]. [address] [place] [address]. All rights reserved. This information is not intended as a substitute for professional medical care. Always follow your healthcare professional's instructions.        Knee Arthroscopy: Conditions Treated  Arthroscopy is used to find and treat many types of knee problems. These include tears in the meniscal cartilage, joint loose bodies, or anterior cruciate ligament (ACL) tears.     Damaged meniscal cartilage is removed.   Meniscal cartilage tears  There are several types of meniscal cartilage tears. The meniscal cartilage attaches on the tibia (shinbone) and acts as a shock absorber for the knee. Your surgery will depend on the type and extent of your injury. Your surgeon can remove the damaged tissue or fix the tear. Treatment should ease the pain and swelling. It can also help keep the joint from locking.     To replace damaged ACL tissue, a graft of strong, healthy tissue is attached.   ACL tears  A torn ACL (anterior cruciate ligament) can make the knee unstable. You may have pain and swelling, and your knee may give out. Your surgeon can repair the ACL by reconstructing it. To rebuild your ACL, damaged tissue may be replaced with a graft of healthy tissue from an area near your knee, or from a donor.     Date Last Reviewed: 8/31/2015 © 2000-2016 [employer]. [address] [place] [address].  All rights reserved. This information is not intended as a substitute for professional medical care. Always follow your healthcare professional's instructions.

## 2017-04-05 NOTE — LETTER
April 5, 2017      Julee Santoyo PA-C  [address]  [facility] [address]           [facility]  [address]  [facility] [address]  Phone: [phone]          Patient: Jojo Boateng   MR Number: [id number]   YOB: 1985   Date of Visit: 4/5/2017       Dear Julee Santoyo:    Thank you for referring Jojo Boateng to me for evaluation. Attached you will find relevant portions of my assessment and plan of care.    If you have questions, please do not hesitate to call me. I look forward to following Jojo Boateng along with you.    Sincerely,    Radha Barclay MD    Enclosure  CC:  No Recipients    If you would like to receive this communication electronically, please contact [email or web address] or [phone] to request more information on [employer] access.    For providers and/or their staff who would like to refer a patient to [facility], please contact us through our one-stop-shop provider referral line, [facility], at [phone].    If you feel you have received this communication in error or would no longer like to receive these types of communications, please e-mail [email or web address]

## 2017-04-12 ENCOUNTER — TELEPHONE (OUTPATIENT)
Dept: SPORTS MEDICINE | Facility: CLINIC | Age: 32
End: 2017-04-12

## 2017-04-12 NOTE — TELEPHONE ENCOUNTER
----- Message from Shannan Kc sent at 4/12/2017  8:48 AM CDT -----  Contact: self@ home   Pt is calling to speak with a staff member about her medical clearance Paperwork.

## 2017-04-13 ENCOUNTER — HOSPITAL ENCOUNTER (OUTPATIENT)
Dept: PREADMISSION TESTING | Facility: OTHER | Age: 32
Discharge: HOME | End: 2017-04-13
Attending: ORTHOPAEDIC SURGERY
Payer: COMMERCIAL

## 2017-04-13 ENCOUNTER — OFFICE VISIT (OUTPATIENT)
Dept: SPORTS MEDICINE | Facility: CLINIC | Age: 32
End: 2017-04-13
Payer: COMMERCIAL

## 2017-04-13 ENCOUNTER — ANESTHESIA EVENT (OUTPATIENT)
Dept: SURGERY | Facility: OTHER | Age: 32
End: 2017-04-13
Payer: COMMERCIAL

## 2017-04-13 VITALS
WEIGHT: 293 LBS | SYSTOLIC BLOOD PRESSURE: 160 MMHG | DIASTOLIC BLOOD PRESSURE: 90 MMHG | HEART RATE: 77 BPM | HEIGHT: 66 IN | TEMPERATURE: 99 F | BODY MASS INDEX: 47.09 KG/M2 | RESPIRATION RATE: 16 BRPM

## 2017-04-13 VITALS
DIASTOLIC BLOOD PRESSURE: 84 MMHG | HEIGHT: 66 IN | WEIGHT: 293 LBS | SYSTOLIC BLOOD PRESSURE: 142 MMHG | BODY MASS INDEX: 47.09 KG/M2 | HEART RATE: 115 BPM

## 2017-04-13 DIAGNOSIS — M23.91 KNEE INTERNAL DERANGEMENT, RIGHT: Primary | ICD-10-CM

## 2017-04-13 PROCEDURE — 99999 PR PBB SHADOW E&M-EST. PATIENT-LVL III: CPT | Mod: PBBFAC,,, | Performed by: PHYSICIAN ASSISTANT

## 2017-04-13 PROCEDURE — 99499 UNLISTED E&M SERVICE: CPT | Mod: S$GLB,,, | Performed by: PHYSICIAN ASSISTANT

## 2017-04-13 RX ORDER — SODIUM CHLORIDE, SODIUM LACTATE, POTASSIUM CHLORIDE, CALCIUM CHLORIDE 600; 310; 30; 20 MG/100ML; MG/100ML; MG/100ML; MG/100ML
INJECTION, SOLUTION INTRAVENOUS CONTINUOUS
Status: CANCELLED | OUTPATIENT
Start: 2017-04-13

## 2017-04-13 RX ORDER — HYDROCODONE BITARTRATE AND ACETAMINOPHEN 10; 325 MG/1; MG/1
TABLET ORAL
Qty: 60 TABLET | Refills: 0 | Status: SHIPPED | OUTPATIENT
Start: 2017-04-13 | End: 2018-08-06

## 2017-04-13 RX ORDER — CELECOXIB 200 MG/1
200 CAPSULE ORAL 2 TIMES DAILY
Qty: 40 CAPSULE | Refills: 0 | Status: SHIPPED | OUTPATIENT
Start: 2017-04-13 | End: 2017-05-08 | Stop reason: ALTCHOICE

## 2017-04-13 RX ORDER — PROMETHAZINE HYDROCHLORIDE 25 MG/1
25 TABLET ORAL EVERY 6 HOURS PRN
Qty: 30 TABLET | Refills: 0 | Status: SHIPPED | OUTPATIENT
Start: 2017-04-13 | End: 2018-08-06

## 2017-04-13 RX ORDER — MIDAZOLAM HYDROCHLORIDE 5 MG/ML
4 INJECTION INTRAMUSCULAR; INTRAVENOUS ONCE AS NEEDED
Status: CANCELLED | OUTPATIENT
Start: 2017-04-13 | End: 2017-04-13

## 2017-04-13 RX ORDER — FAMOTIDINE 20 MG/1
20 TABLET, FILM COATED ORAL
Status: CANCELLED | OUTPATIENT
Start: 2017-04-13 | End: 2017-04-13

## 2017-04-13 NOTE — ANESTHESIA PREPROCEDURE EVALUATION
04/13/2017  Jojo Boateng is a 31 y.o., female.    Anesthesia Evaluation    I have reviewed the Patient Summary Reports.    I have reviewed the Nursing Notes.   I have reviewed the Medications.     Review of Systems  Anesthesia Hx:  No problems with previous Anesthesia    Social:  Non-Smoker    Cardiovascular:   Exercise tolerance: good    Pulmonary:  Pulmonary Normal    Renal/:  Renal/ Normal     Hepatic/GI:  Hepatic/GI Normal    Neurological:  Neurology Normal    Endocrine:  Endocrine Normal        Physical Exam  General:  Morbid Obesity    Airway/Jaw/Neck:  Airway Findings: Mouth Opening: Small, but > 3cm General Airway Assessment: Possible difficult mask airway, Possible difficult intubation  Mallampati: IV  TM Distance: 4 - 6 cm  Jaw/Neck Findings:  Neck ROM: Extension Decreased, Mild      Dental:  Dental Findings: In tact             Anesthesia Plan  Type of Anesthesia, risks & benefits discussed:  Anesthesia Type:  general  Patient's Preference:   Intra-op Monitoring Plan: standard ASA monitors  Intra-op Monitoring Plan Comments:   Post Op Pain Control Plan:   Post Op Pain Control Plan Comments:   Induction:   IV  Beta Blocker:         Informed Consent: Patient understands risks and agrees with Anesthesia plan.  Questions answered. Anesthesia consent signed with patient.  ASA Score: 3     Day of Surgery Review of History & Physical:    H&P update referred to the surgeon.         Ready For Surgery From Anesthesia Perspective.

## 2017-04-13 NOTE — DISCHARGE INSTRUCTIONS
PRE-ADMIT TESTING -  [phone]    [address]  [facility]        OUTPATIENT SURGERY UNIT - [phone]    Your surgery has been scheduled at [facility]. We are pleased to have the opportunity to serve you. For Further Information please call [phone].    On the day of surgery please report to the Information Desk on the 1st floor.    CONTACT YOUR PHYSICIAN'S OFFICE THE DAY PRIOR TO YOUR SURGERY TO OBTAIN YOUR ARRIVAL TIME.     The evening before surgery do not eat anything after 9 p.m. ( this includes hard candy, chewing gum and mints).  You may have GATORADE, POWERADE AND WATER FROM 9 p.m. until leaving home to come to the hospital.   DO NOT DRINK ANY LIQUIDS ON THE WAY TO THE HOSPITAL.     SPECIAL MEDICATION INSTRUCTIONS: TAKE medications checked off by the Anesthesiologist on your Medication List.    Angiogram Patients: Take medications as instructed by your physician, including aspirin.     Surgery Patients:    If you take ASPIRIN - Your PHYSICIAN/SURGEON will need to inform you IF/OR when you need to stop taking aspirin prior to your surgery.     Do Not take any medications containing IBUPROFEN.  Do Not Wear any make-up or dark nail polish   (especially eye make-up) to surgery. If you come to surgery with makeup on you will be required to remove the makeup or nail polish.    Do not shave your surgical area at least 5 days prior to your surgery. The surgical prep will be performed at the hospital according to Infection Control regulations.    Leave all valuables at home.   Do Not wear any jewelry or watches, including any metal in body piercings.  Contact Lens must be removed before surgery. Either do not wear the contact lens or bring a case and solution for storage.  Please bring a container for eyeglasses or dentures as required.  Bring any paperwork your physician has provided, such as consent forms,  history and physicals, doctor's orders, etc.   Bring comfortable  clothes that are loose fitting to wear upon discharge. Take into consideration the type of surgery being performed.  Maintain your diet as advised per your physician the day prior to surgery.      Adequate rest the night before surgery is advised.   Park in the Parking lot behind the hospital or in the [facility] across the street from the parking lot. Parking is complimentary.  If you will be discharged the same day as your procedure, please arrange for a responsible adult to drive you home or to accompany you if traveling by taxi.   YOU WILL NOT BE PERMITTED TO DRIVE OR TO LEAVE THE HOSPITAL ALONE AFTER SURGERY.   It is strongly recommended that you arrange for someone to remain with you for the first 24 hrs following your surgery.       Thank you for your cooperation.  The Staff of [facility].        Bathing Instructions                                                                 Please shower the evening before and morning of your procedure with    ANTIBACTERIAL SOAP. ( DIAL, etc )  Concentrate on the surgical area   for at least 3 minutes and rinse completely. Dry off as usual.   Do not use any deodorant, powder, body lotions, perfume, after shave or    cologne.

## 2017-04-13 NOTE — IP AVS SNAPSHOT
[facility] Location ([place])  [address] [place], [place] [address]  Phone: [phone]           Patient Discharge Instructions  Our goal is to set you up for success. This packet includes information on your condition, medications, and your home care. It will help you care for yourself to prevent having to return to the hospital.     Please ask your nurse if you have any questions.      There are many details to remember when preparing for your surgery. Here is what you will need to do, please ask your nurse if there are more specific instructions and if you have any questions:    1. Before procedure Do not smoke or drink alcoholic beverages 24 hours prior to your procedure. Do not eat or drink anything 8 hours before your procedure - this includes gum, mints, and candy.     2. Day of procedure Please remove all jewelry for the procedure. If you wear contact lenses, dentures, hearing aids or glasses, bring a container to put them in during your surgery and give to a family member.  If your doctor has scheduled you for an overnight stay, bring a small overnight bag with any personal items that you need.      3. After procedure  Make arrangements in advance for transportation home by a responsible adult. It is not safe to drive a vehicle during the 24 hours following surgery.     PLEASE NOTE: You may be contacted the day before your surgery to confirm your surgery date and arrival time. The Surgery schedule has many variables which may affect the time of your surgery case. Family members should be available if your surgery time changes.           [facility]  Unless otherwise directed by your provider, please   contact [facility], our nurse care line   that is available for 24/7 assistance.     [phone] (toll-free)     Registered nurses in the [facility]   provide: appointment scheduling, clinical advisement, health education, and other advisory services.                  **  Verify the list of medication(s) below is accurate and up to date. Carry this with you in case of emergency. If your medications have changed, please notify your healthcare provider.             Medication List      TAKE these medications        Additional Info                      fluticasone 50 mcg/actuation nasal spray   Commonly known as:  FLONASE   Refills:  0      Begin Date    AM    Noon    PM    Bedtime       hydrochlorothiazide 25 MG tablet   Commonly known as:  HYDRODIURIL   Refills:  0      Begin Date    AM    Noon    PM    Bedtime       ibuprofen 400 MG tablet   Commonly known as:  ADVIL,MOTRIN   Refills:  0   Dose:  400 mg    Instructions:  Take 400 mg by mouth every 4 (four) hours.     Begin Date    AM    Noon    PM    Bedtime       loratadine 10 mg tablet   Commonly known as:  CLARITIN   Refills:  0   Dose:  10 mg    Instructions:  Take 10 mg by mouth once daily.     Begin Date    AM    Noon    PM    Bedtime                  Please bring to all follow up appointments:    1. A copy of your discharge instructions.  2. All medicines you are currently taking in their original bottles.  3. Identification and insurance card.    Please arrive 15 minutes ahead of scheduled appointment time.    Please call 24 hours in advance if you must reschedule your appointment and/or time.        Your Scheduled Appointments     Apr 13, 2017 12:00 PM CDT   Pre-Admit Testing Visit with PRE-ADMIT, [facility] ([facility])    [address]  [facility] [address]   [phone]            Apr 13, 2017  2:45 PM CDT   Pre OP with LAURO Wall III - Sports Medicine ([facility])    [address]  [facility] [address]   [phone]            Apr 20, 2017 12:00 PM CDT   New Physical Therapy Patient with Dea Cohn, PT   [facility] ([facility])    [address]  [facility] [address]    [phone]            May 08, 2017  8:00 AM CDT   Post OP with Roberto Burnett III, PA-C   [facility] ([facility])    [address]  [facility] [address] [phone]            Jun 05, 2017  7:30 AM CDT   Post OP with Radha Barclay MD   [facility] ([facility])    [address]  [facility] [address] [phone]              Your Future Surgeries/Procedures     Apr 25, 2017   Surgery with Radha Barclay MD   [facility] ([facility])    [address] [facility] [id number] [phone]                  Discharge Instructions       PRE-ADMIT TESTING -  [phone]    [address] [place] [address]        OUTPATIENT SURGERY UNIT - [phone]    Your surgery has been scheduled at [facility]. We are pleased to have the opportunity to serve you. For Further Information please call [phone].    On the day of surgery please report to the Information Desk on the 1st floor.    CONTACT YOUR PHYSICIAN'S OFFICE THE DAY PRIOR TO YOUR SURGERY TO OBTAIN YOUR ARRIVAL TIME.     The evening before surgery do not eat anything after 9 p.m. ( this includes hard candy, chewing gum and mints).  You may have GATORADE, POWERADE AND WATER FROM 9 p.m. until leaving home to come to the hospital.   DO NOT DRINK ANY LIQUIDS ON THE WAY TO THE HOSPITAL.     SPECIAL MEDICATION INSTRUCTIONS: TAKE medications checked off by the Anesthesiologist on your Medication List.    Angiogram Patients: Take medications as instructed by your physician, including aspirin.     Surgery Patients:    If you take ASPIRIN - Your PHYSICIAN/SURGEON will need to inform you IF/OR when you need to stop taking aspirin prior to your surgery.     Do Not take any medications containing IBUPROFEN.  Do Not Wear any make-up or dark nail polish   (especially eye make-up) to surgery. If you come to surgery with makeup on you will be  required to remove the makeup or nail polish.    Do not shave your surgical area at least 5 days prior to your surgery. The surgical prep will be performed at the hospital according to Infection Control regulations.    Leave all valuables at home.   Do Not wear any jewelry or watches, including any metal in body piercings.  Contact Lens must be removed before surgery. Either do not wear the contact lens or bring a case and solution for storage.  Please bring a container for eyeglasses or dentures as required.  Bring any paperwork your physician has provided, such as consent forms,  history and physicals, doctor's orders, etc.   Bring comfortable clothes that are loose fitting to wear upon discharge. Take into consideration the type of surgery being performed.  Maintain your diet as advised per your physician the day prior to surgery.      Adequate rest the night before surgery is advised.   Park in the Parking lot behind the hospital or in the [address] [facility] across the street from the parking lot. Parking is complimentary.  If you will be discharged the same day as your procedure, please arrange for a responsible adult to drive you home or to accompany you if traveling by taxi.   YOU WILL NOT BE PERMITTED TO DRIVE OR TO LEAVE THE HOSPITAL ALONE AFTER SURGERY.   It is strongly recommended that you arrange for someone to remain with you for the first 24 hrs following your surgery.       Thank you for your cooperation.  The Staff of [facility].        Bathing Instructions                                                                 Please shower the evening before and morning of your procedure with    ANTIBACTERIAL SOAP. ( DIAL, etc )  Concentrate on the surgical area   for at least 3 minutes and rinse completely. Dry off as usual.   Do not use any deodorant, powder, body lotions, perfume, after shave or    cologne.                                                Admission Information      "Date & Time Provider Department CSN    4/13/2017 12:00 PM Radha Barclay MD [facility] [id number]      Care Providers     Provider Role Specialty Primary office phone    Radha Barclay MD Attending Provider Sports Medicine [phone]      Your Vitals Were     BP Pulse Temp Resp Height Weight    160/90 77 98.5 °F (36.9 °C) 16 5' 6" (1.676 m) 145.6 kg (321 lb)    Last Period BMI             03/16/2017 51.81 kg/m2         Recent Lab Values     No lab values to display.      Allergies as of 4/13/2017        Reactions    Ultram [Tramadol] Hives      Advance Directives     An advance directive is a document which, in the event you are no longer able to make decisions for yourself, tells your healthcare team what kind of treatment you do or do not want to receive, or who you would like to make those decisions for you.  If you do not currently have an advance directive, [facility] encourages you to create one.  For more information call:  [phone]), [phone] ([phone]),  or log on to [email or web address].        Smoking Cessation     If you would like to quit smoking:   You may be eligible for free services if you are a [place] resident and started smoking cigarettes before September 1, 1988.  Call the Smoking Cessation Trust ([facility]) toll free at (419) 381-9957 or (518) 734-6515.   Call 1-800-QUIT-NOW if you do not meet the above criteria.   Contact us via email: [email or web address]   View our website for more information: [email or web address]        Language Assistance Services     ATTENTION: Language assistance services are available, free of charge. Please call [phone].      ATENCIÓN: Si habla español, tiene a cline disposición servicios gratuitos de asistencia lingüística. Llame al [phone].     CHÚ Ý: N?u b?n nói Ti?ng Vi?t, có các d?ch v? h? tr? ngôn ng? mi?n phí dành cho b?n. G?i s? [phone].         [facility] " complies with applicable Federal civil rights laws and does not discriminate on the basis of race, color, national origin, age, disability, or sex.

## 2017-04-13 NOTE — MR AVS SNAPSHOT
[facility]  [address]  [facility] [address]  Phone: [phone]                  Jojo Boateng   2017 2:45 PM   Appointment    Description:  Female : 1985   Provider:  Roberto Burnett III, PA-C   Department:  [facility]                To Do List           Future Appointments        Provider Department Dept Phone    2017 2:45 PM Roberto Burnett III, PA-C [facility] [phone]    2017 12:00 PM Dae Cohn PT [facility] [phone]    2017 8:00 AM Roberto Burnett III, PA-C [facility] [phone]    2017 7:30 AM Radha Barclay MD [facility] [phone]      Your Future Surgeries/Procedures     2017   Surgery with Radha Barclay MD   [facility] ([facility])    [address] [facility] [address]   [phone]              Goals (5 Years of Data)     None      [facility]     [facility] Nurse Care Line -  Assistance  Unless otherwise directed by your provider, please contact [facility], our nurse care line that is available for  assistance.     Registered nurses in the [facility] provide: appointment scheduling, clinical advisement, health education, and other advisory services.  Call: [phone] (toll free)               Medications           Message regarding Medications     Verify the changes and/or additions to your medication regime listed below are the same as discussed with your clinician today.  If any of these changes or additions are incorrect, please notify your healthcare provider.             Verify that the below list of medications is an accurate representation of the medications you are currently taking.  If none reported, the list may be blank. If incorrect, please contact your healthcare provider. Carry this list with you in case of emergency.            Current Medications     fluticasone (FLONASE) 50 mcg/actuation nasal spray     hydrochlorothiazide (HYDRODIURIL) 25 MG tablet     ibuprofen (ADVIL,MOTRIN) 400 MG tablet Take 400 mg by mouth every 4 (four) hours.    loratadine (CLARITIN) 10 mg tablet Take 10 mg by mouth once daily.           Clinical Reference Information           Your Vitals Were     Last Period                   03/16/2017           Allergies as of 4/13/2017     Ultram [Tramadol]      Immunizations Administered on Date of Encounter - 4/13/2017     None      Language Assistance Services     ATTENTION: Language assistance services are available, free of charge. Please call [phone].      ATENCIÓN: Si habla briseyda, tiene a cline disposición servicios gratuitos de asistencia lingüística. Llame al [phone].     CHÚ Ý: N?u b?n nói Ti?ng Vi?t, có các d?ch v? h? tr? ngôn ng? mi?n phí dành cho b?n. G?i s? [phone].         [facility] complies with applicable Federal civil rights laws and does not discriminate on the basis of race, color, national origin, age, disability, or sex.

## 2017-04-15 NOTE — H&P
Jojo Boateng  is here for a completion of her perioperative paperwork. she  Is scheduled to undergo     1. Right knee lateral menisectomy   2. Right knee arthroscopic chondroplasty  3. Right knee arthroscopic synovectomy  4. Amniox Arthrocentesis, 59646 on 4/25/17.      She is a healthy individual and does need clearance for this procedure, which she has received from [facility] which we will batch into our system including EKG and blood work.      Risks, indications and benefits of the surgical procedure were discussed with the patient. All questions with regard to surgery, rehab, expected return to functional activities, activities of daily living and recreational endeavors were answered to her satisfaction.    Once no other questions were asked, a brief history and physical exam was then performed.      PHYSICAL EXAM:  GEN: A&Ox3, WD WN NAD  HEENT: WNL  CHEST: CTAB, no W/R/R  HEART: RRR, no M/R/G  ABD: Soft, NT ND, BS x4 QUADS  MS; See Epic  NEURO: CN II-XII intact       The surgical consent was then reviewed with the patient, who agreed with all the contents of the consent form and it was signed. she was then given the [facility] surgery packet to bring with her to [facility] for the anesthesia portion of her perioperative paperwork.   For all physicians except for Dr. Packer, we will email and possibly fax the consent forms and booking sheets to [facility].    PHYSICAL THERAPY:  She was also instructed regarding physical therapy and will begin on  POD1. She was given a copy of the original prescription to schedule. Another copy of this prescription was also faxed to [facility] - Randi Termin.    POST OP CARE:instructions were reviewed including care of the wound and dressing after surgery and when she can shower.     PAIN MANAGEMENT: Jojo Boateng was also given her pain management regime, which includes the TENS unit given to her by [facility] along with the education  required for its use. She was also instructed regarding the Polar ice unit that will be in place after surgery and her postoperative pain medications.     PAIN MEDICATION:  Norco 10/325mg 1 po q 4-6 hours prn pain  Celebrex 200mg BID prn pain  Phenergan 25 mg one p.o. q.4-6 hours p.r.n. nausea and vomiting.    The patient was instructed to buy and take:  Aspirin 325mg BID x 6 weeks for DVT prophylaxis starting on the evening after surgery.  Patient will also use bilateral TEDs on lower extremities.     DVT prophylaxis was discussed at length with the patient today. The patient was questioned about their risk factors for developing DVTs including if there was any history of DVTs. Patient's risk for developing DVTs was determined. The patient was asked if any specific recommendations were given from the doctor/s that did pre-operative surgical clearance.     As there were no other questions to be asked, she was given my business card along with Radha Barclay MD business card if she has any questions or concerns prior to surgery or in the postop period.

## 2017-04-20 ENCOUNTER — CLINICAL SUPPORT (OUTPATIENT)
Dept: REHABILITATION | Facility: HOSPITAL | Age: 32
End: 2017-04-20
Attending: ORTHOPAEDIC SURGERY
Payer: COMMERCIAL

## 2017-04-20 DIAGNOSIS — M25.561 ACUTE PAIN OF RIGHT KNEE: ICD-10-CM

## 2017-04-20 PROCEDURE — 97110 THERAPEUTIC EXERCISES: CPT

## 2017-04-20 PROCEDURE — 97161 PT EVAL LOW COMPLEX 20 MIN: CPT

## 2017-04-25 ENCOUNTER — SURGERY (OUTPATIENT)
Age: 32
End: 2017-04-25

## 2017-04-25 ENCOUNTER — ANESTHESIA (OUTPATIENT)
Dept: SURGERY | Facility: OTHER | Age: 32
End: 2017-04-25
Payer: COMMERCIAL

## 2017-04-25 ENCOUNTER — HOSPITAL ENCOUNTER (OUTPATIENT)
Facility: OTHER | Age: 32
Discharge: HOME | End: 2017-04-25
Attending: ORTHOPAEDIC SURGERY | Admitting: ORTHOPAEDIC SURGERY
Payer: COMMERCIAL

## 2017-04-25 VITALS
SYSTOLIC BLOOD PRESSURE: 134 MMHG | DIASTOLIC BLOOD PRESSURE: 80 MMHG | RESPIRATION RATE: 16 BRPM | HEART RATE: 72 BPM | WEIGHT: 293 LBS | TEMPERATURE: 98 F | HEIGHT: 66 IN | OXYGEN SATURATION: 95 % | BODY MASS INDEX: 47.09 KG/M2

## 2017-04-25 DIAGNOSIS — S83.8X1D ACUTE LATERAL MENISCAL INJURY OF RIGHT KNEE, SUBSEQUENT ENCOUNTER: Primary | ICD-10-CM

## 2017-04-25 DIAGNOSIS — M23.91 KNEE INTERNAL DERANGEMENT, RIGHT: ICD-10-CM

## 2017-04-25 PROBLEM — M23.90 KNEE INTERNAL DERANGEMENT: Status: ACTIVE | Noted: 2017-04-25

## 2017-04-25 LAB
B-HCG UR QL: NEGATIVE
CTP QC/QA: YES

## 2017-04-25 PROCEDURE — 25000003 PHARM REV CODE 250: Performed by: ORTHOPAEDIC SURGERY

## 2017-04-25 PROCEDURE — 25000003 PHARM REV CODE 250: Performed by: ANESTHESIOLOGY

## 2017-04-25 PROCEDURE — 27201423 OPTIME MED/SURG SUP & DEVICES STERILE SUPPLY: Performed by: ORTHOPAEDIC SURGERY

## 2017-04-25 PROCEDURE — 71000015 HC POSTOP RECOV 1ST HR: Performed by: ORTHOPAEDIC SURGERY

## 2017-04-25 PROCEDURE — V2790 AMNIOTIC MEMBRANE: HCPCS | Performed by: ORTHOPAEDIC SURGERY

## 2017-04-25 PROCEDURE — 81025 URINE PREGNANCY TEST: CPT | Performed by: PHYSICIAN ASSISTANT

## 2017-04-25 PROCEDURE — 71000033 HC RECOVERY, INTIAL HOUR: Performed by: ORTHOPAEDIC SURGERY

## 2017-04-25 PROCEDURE — 36000710: Performed by: ORTHOPAEDIC SURGERY

## 2017-04-25 PROCEDURE — 63600175 PHARM REV CODE 636 W HCPCS: Performed by: NURSE ANESTHETIST, CERTIFIED REGISTERED

## 2017-04-25 PROCEDURE — 29874 ARTHRS KNEE SURG RMV LOOS/FB: CPT | Mod: 59,RT,, | Performed by: ORTHOPAEDIC SURGERY

## 2017-04-25 PROCEDURE — 63600175 PHARM REV CODE 636 W HCPCS: Performed by: ANESTHESIOLOGY

## 2017-04-25 PROCEDURE — 25000003 PHARM REV CODE 250: Performed by: NURSE ANESTHETIST, CERTIFIED REGISTERED

## 2017-04-25 PROCEDURE — 36000711: Performed by: ORTHOPAEDIC SURGERY

## 2017-04-25 PROCEDURE — 37000008 HC ANESTHESIA 1ST 15 MINUTES: Performed by: ORTHOPAEDIC SURGERY

## 2017-04-25 PROCEDURE — 71000016 HC POSTOP RECOV ADDL HR: Performed by: ORTHOPAEDIC SURGERY

## 2017-04-25 PROCEDURE — 63600175 PHARM REV CODE 636 W HCPCS: Performed by: ORTHOPAEDIC SURGERY

## 2017-04-25 PROCEDURE — 37000009 HC ANESTHESIA EA ADD 15 MINS: Performed by: ORTHOPAEDIC SURGERY

## 2017-04-25 PROCEDURE — 63600175 PHARM REV CODE 636 W HCPCS: Performed by: PHYSICIAN ASSISTANT

## 2017-04-25 PROCEDURE — 29881 ARTHRS KNE SRG MNISECTMY M/L: CPT | Mod: RT,,, | Performed by: ORTHOPAEDIC SURGERY

## 2017-04-25 DEVICE — MATRIX REGENERATIVE CLARIX FLO: Type: IMPLANTABLE DEVICE | Site: KNEE | Status: FUNCTIONAL

## 2017-04-25 RX ORDER — SODIUM CHLORIDE 0.9 % (FLUSH) 0.9 %
3 SYRINGE (ML) INJECTION
Status: DISCONTINUED | OUTPATIENT
Start: 2017-04-25 | End: 2017-04-25 | Stop reason: HOSPADM

## 2017-04-25 RX ORDER — DEXAMETHASONE SODIUM PHOSPHATE 4 MG/ML
INJECTION, SOLUTION INTRA-ARTICULAR; INTRALESIONAL; INTRAMUSCULAR; INTRAVENOUS; SOFT TISSUE
Status: DISCONTINUED | OUTPATIENT
Start: 2017-04-25 | End: 2017-04-25

## 2017-04-25 RX ORDER — GLYCOPYRROLATE 0.2 MG/ML
INJECTION INTRAMUSCULAR; INTRAVENOUS
Status: DISCONTINUED | OUTPATIENT
Start: 2017-04-25 | End: 2017-04-25

## 2017-04-25 RX ORDER — EPINEPHRINE 1 MG/ML
INJECTION, SOLUTION INTRACARDIAC; INTRAMUSCULAR; INTRAVENOUS; SUBCUTANEOUS
Status: DISCONTINUED | OUTPATIENT
Start: 2017-04-25 | End: 2017-04-25 | Stop reason: HOSPADM

## 2017-04-25 RX ORDER — HYDROMORPHONE HYDROCHLORIDE 2 MG/ML
0.4 INJECTION, SOLUTION INTRAMUSCULAR; INTRAVENOUS; SUBCUTANEOUS EVERY 5 MIN PRN
Status: DISCONTINUED | OUTPATIENT
Start: 2017-04-25 | End: 2017-04-25 | Stop reason: HOSPADM

## 2017-04-25 RX ORDER — MIDAZOLAM HYDROCHLORIDE 5 MG/ML
4 INJECTION INTRAMUSCULAR; INTRAVENOUS ONCE AS NEEDED
Status: COMPLETED | OUTPATIENT
Start: 2017-04-25 | End: 2017-04-25

## 2017-04-25 RX ORDER — SUCCINYLCHOLINE CHLORIDE 20 MG/ML
INJECTION INTRAMUSCULAR; INTRAVENOUS
Status: DISCONTINUED | OUTPATIENT
Start: 2017-04-25 | End: 2017-04-25

## 2017-04-25 RX ORDER — OXYCODONE HYDROCHLORIDE 5 MG/1
5 TABLET ORAL
Status: DISCONTINUED | OUTPATIENT
Start: 2017-04-25 | End: 2017-04-25 | Stop reason: HOSPADM

## 2017-04-25 RX ORDER — LIDOCAINE HCL/PF 100 MG/5ML
SYRINGE (ML) INTRAVENOUS
Status: DISCONTINUED | OUTPATIENT
Start: 2017-04-25 | End: 2017-04-25

## 2017-04-25 RX ORDER — FENTANYL CITRATE 50 UG/ML
25 INJECTION, SOLUTION INTRAMUSCULAR; INTRAVENOUS EVERY 5 MIN PRN
Status: DISCONTINUED | OUTPATIENT
Start: 2017-04-25 | End: 2017-04-25 | Stop reason: HOSPADM

## 2017-04-25 RX ORDER — PROPOFOL 10 MG/ML
VIAL (ML) INTRAVENOUS CONTINUOUS PRN
Status: DISCONTINUED | OUTPATIENT
Start: 2017-04-25 | End: 2017-04-25

## 2017-04-25 RX ORDER — ROCURONIUM BROMIDE 10 MG/ML
INJECTION, SOLUTION INTRAVENOUS
Status: DISCONTINUED | OUTPATIENT
Start: 2017-04-25 | End: 2017-04-25

## 2017-04-25 RX ORDER — PHENYLEPHRINE HYDROCHLORIDE 10 MG/ML
INJECTION INTRAVENOUS
Status: DISCONTINUED | OUTPATIENT
Start: 2017-04-25 | End: 2017-04-25

## 2017-04-25 RX ORDER — ONDANSETRON 2 MG/ML
4 INJECTION INTRAMUSCULAR; INTRAVENOUS ONCE AS NEEDED
Status: DISCONTINUED | OUTPATIENT
Start: 2017-04-25 | End: 2017-04-25 | Stop reason: HOSPADM

## 2017-04-25 RX ORDER — PROPOFOL 10 MG/ML
VIAL (ML) INTRAVENOUS
Status: DISCONTINUED | OUTPATIENT
Start: 2017-04-25 | End: 2017-04-25

## 2017-04-25 RX ORDER — FAMOTIDINE 20 MG/1
20 TABLET, FILM COATED ORAL
Status: COMPLETED | OUTPATIENT
Start: 2017-04-25 | End: 2017-04-25

## 2017-04-25 RX ORDER — SODIUM CHLORIDE, SODIUM LACTATE, POTASSIUM CHLORIDE, CALCIUM CHLORIDE 600; 310; 30; 20 MG/100ML; MG/100ML; MG/100ML; MG/100ML
INJECTION, SOLUTION INTRAVENOUS CONTINUOUS
Status: DISCONTINUED | OUTPATIENT
Start: 2017-04-25 | End: 2017-04-25 | Stop reason: HOSPADM

## 2017-04-25 RX ORDER — ACETAMINOPHEN 10 MG/ML
INJECTION, SOLUTION INTRAVENOUS
Status: DISCONTINUED | OUTPATIENT
Start: 2017-04-25 | End: 2017-04-25

## 2017-04-25 RX ORDER — ONDANSETRON 2 MG/ML
INJECTION INTRAMUSCULAR; INTRAVENOUS
Status: DISCONTINUED | OUTPATIENT
Start: 2017-04-25 | End: 2017-04-25

## 2017-04-25 RX ORDER — FENTANYL CITRATE 50 UG/ML
INJECTION, SOLUTION INTRAMUSCULAR; INTRAVENOUS
Status: DISCONTINUED | OUTPATIENT
Start: 2017-04-25 | End: 2017-04-25

## 2017-04-25 RX ORDER — SODIUM CHLORIDE 0.9 % (FLUSH) 0.9 %
3 SYRINGE (ML) INJECTION EVERY 8 HOURS
Status: DISCONTINUED | OUTPATIENT
Start: 2017-04-25 | End: 2017-04-25 | Stop reason: HOSPADM

## 2017-04-25 RX ORDER — MEPERIDINE HYDROCHLORIDE 50 MG/ML
12.5 INJECTION INTRAMUSCULAR; INTRAVENOUS; SUBCUTANEOUS ONCE AS NEEDED
Status: DISCONTINUED | OUTPATIENT
Start: 2017-04-25 | End: 2017-04-25 | Stop reason: HOSPADM

## 2017-04-25 RX ADMIN — FENTANYL CITRATE 25 MCG: 50 INJECTION INTRAMUSCULAR; INTRAVENOUS at 09:04

## 2017-04-25 RX ADMIN — EPINEPHRINE 6 ML: 1 INJECTION, SOLUTION INTRAMUSCULAR; SUBCUTANEOUS at 07:04

## 2017-04-25 RX ADMIN — PROPOFOL 180 MCG/KG/MIN: 10 INJECTION, EMULSION INTRAVENOUS at 08:04

## 2017-04-25 RX ADMIN — SODIUM CHLORIDE, SODIUM LACTATE, POTASSIUM CHLORIDE, AND CALCIUM CHLORIDE: 600; 310; 30; 20 INJECTION, SOLUTION INTRAVENOUS at 06:04

## 2017-04-25 RX ADMIN — FAMOTIDINE 20 MG: 20 TABLET, FILM COATED ORAL at 05:04

## 2017-04-25 RX ADMIN — PROPOFOL 50 MG: 10 INJECTION, EMULSION INTRAVENOUS at 07:04

## 2017-04-25 RX ADMIN — SODIUM CHLORIDE, SODIUM LACTATE, POTASSIUM CHLORIDE, AND CALCIUM CHLORIDE: 600; 310; 30; 20 INJECTION, SOLUTION INTRAVENOUS at 08:04

## 2017-04-25 RX ADMIN — ACETAMINOPHEN 1000 MG: 10 INJECTION, SOLUTION INTRAVENOUS at 07:04

## 2017-04-25 RX ADMIN — GLYCOPYRROLATE 0.2 MG: 0.2 INJECTION, SOLUTION INTRAMUSCULAR; INTRAVENOUS at 07:04

## 2017-04-25 RX ADMIN — ROCURONIUM BROMIDE 5 MG: 10 INJECTION, SOLUTION INTRAVENOUS at 07:04

## 2017-04-25 RX ADMIN — ONDANSETRON 8 MG: 2 INJECTION INTRAMUSCULAR; INTRAVENOUS at 07:04

## 2017-04-25 RX ADMIN — ROPIVACAINE HYDROCHLORIDE: 5 INJECTION, SOLUTION EPIDURAL; INFILTRATION; PERINEURAL at 07:04

## 2017-04-25 RX ADMIN — PROPOFOL 50 MG: 10 INJECTION, EMULSION INTRAVENOUS at 08:04

## 2017-04-25 RX ADMIN — MIDAZOLAM HYDROCHLORIDE 4 MG: 5 INJECTION, SOLUTION INTRAMUSCULAR; INTRAVENOUS at 05:04

## 2017-04-25 RX ADMIN — FENTANYL CITRATE 50 MCG: 50 INJECTION, SOLUTION INTRAMUSCULAR; INTRAVENOUS at 09:04

## 2017-04-25 RX ADMIN — LIDOCAINE HYDROCHLORIDE 50 MG: 20 INJECTION, SOLUTION INTRAVENOUS at 07:04

## 2017-04-25 RX ADMIN — PHENYLEPHRINE HYDROCHLORIDE 100 MCG: 10 INJECTION INTRAVENOUS at 07:04

## 2017-04-25 RX ADMIN — PROPOFOL 250 MG: 10 INJECTION, EMULSION INTRAVENOUS at 07:04

## 2017-04-25 RX ADMIN — DEXAMETHASONE SODIUM PHOSPHATE 8 MG: 4 INJECTION, SOLUTION INTRAMUSCULAR; INTRAVENOUS at 07:04

## 2017-04-25 RX ADMIN — OXYCODONE HYDROCHLORIDE 5 MG: 5 TABLET ORAL at 09:04

## 2017-04-25 RX ADMIN — FENTANYL CITRATE 100 MCG: 50 INJECTION, SOLUTION INTRAMUSCULAR; INTRAVENOUS at 07:04

## 2017-04-25 RX ADMIN — SUCCINYLCHOLINE CHLORIDE 150 MG: 20 INJECTION, SOLUTION INTRAMUSCULAR; INTRAVENOUS at 07:04

## 2017-04-25 RX ADMIN — CEFAZOLIN SODIUM 3 ML: 2 SOLUTION INTRAVENOUS at 07:04

## 2017-04-25 NOTE — H&P (VIEW-ONLY)
Jojo Boateng  is here for a completion of her perioperative paperwork. she  Is scheduled to undergo     1. Right knee lateral menisectomy   2. Right knee arthroscopic chondroplasty  3. Right knee arthroscopic synovectomy  4. Amniox Arthrocentesis, 53753 on 4/25/17.      She is a healthy individual and does need clearance for this procedure, which she has received from [facility] which we will batch into our system including EKG and blood work.      Risks, indications and benefits of the surgical procedure were discussed with the patient. All questions with regard to surgery, rehab, expected return to functional activities, activities of daily living and recreational endeavors were answered to her satisfaction.    Once no other questions were asked, a brief history and physical exam was then performed.      PHYSICAL EXAM:  GEN: A&Ox3, WD WN NAD  HEENT: WNL  CHEST: CTAB, no W/R/R  HEART: RRR, no M/R/G  ABD: Soft, NT ND, BS x4 QUADS  MS; See Epic  NEURO: CN II-XII intact       The surgical consent was then reviewed with the patient, who agreed with all the contents of the consent form and it was signed. she was then given the [facility] surgery packet to bring with her to [facility] for the anesthesia portion of her perioperative paperwork.   For all physicians except for Dr. Packer, we will email and possibly fax the consent forms and booking sheets to [facility] pre-[facility]mit.    PHYSICAL THERAPY:  She was also instructed regarding physical therapy and will begin on  POD1. She was given a copy of the original prescription to schedule. Another copy of this prescription was also faxed to [facility] Randi Termin.    POST OP CARE:instructions were reviewed including care of the wound and dressing after surgery and when she can shower.     PAIN MANAGEMENT: Jojo Boateng was also given her pain management regime, which includes the TENS unit given to her by [facility] along with the education  required for its use. She was also instructed regarding the Polar ice unit that will be in place after surgery and her postoperative pain medications.     PAIN MEDICATION:  Norco 10/325mg 1 po q 4-6 hours prn pain  Celebrex 200mg BID prn pain  Phenergan 25 mg one p.o. q.4-6 hours p.r.n. nausea and vomiting.    The patient was instructed to buy and take:  Aspirin 325mg BID x 6 weeks for DVT prophylaxis starting on the evening after surgery.  Patient will also use bilateral TEDs on lower extremities.     DVT prophylaxis was discussed at length with the patient today. The patient was questioned about their risk factors for developing DVTs including if there was any history of DVTs. Patient's risk for developing DVTs was determined. The patient was asked if any specific recommendations were given from the doctor/s that did pre-operative surgical clearance.     As there were no other questions to be asked, she was given my business card along with Radha Barclay MD business card if she has any questions or concerns prior to surgery or in the postop period.

## 2017-04-25 NOTE — PLAN OF CARE
Patient prefers to have significant other Tasneem Castillo present for discharge teaching. Please contact them @[phone].

## 2017-04-25 NOTE — TRANSFER OF CARE
"Anesthesia Transfer of Care Note    Patient: Jojo Boateng    Procedure(s) Performed: Procedure(s) (LRB):  CHONDROPLASTY-KNEE (Right)  SYNOVECTOMY-KNEE (Right)  ARTHROCENTESIS; AMNIOX (Right)  ARTHROSCOPY-MENISCECTOMY (Right)  REMOVAL-LOOSE-BODY-ARTHROSCOPIC (Right)    Patient location: PACU    Anesthesia Type: general    Transport from OR: Transported from OR on 2-3 L/min O2 by NC with adequate spontaneous ventilation    Post pain: adequate analgesia    Post assessment: no apparent anesthetic complications and tolerated procedure well    Post vital signs: stable    Level of consciousness: awake, alert and oriented    Nausea/Vomiting: no nausea/vomiting    Complications: none          Last vitals:   Visit Vitals    BP (!) 163/91 (BP Location: Right arm, Patient Position: Lying, BP Method: Automatic)    Pulse 76    Temp 36.7 °C (98 °F) (Oral)    Resp 16    Ht 5' 6" (1.676 m)    Wt (!) 145.6 kg (320 lb 15.8 oz)    LMP 03/16/2017    SpO2 100%    Breastfeeding No    BMI 51.81 kg/m2     "

## 2017-04-25 NOTE — IP AVS SNAPSHOT
[facility] Location ([facility])  [address] [place], [place] [id number]  Phone: [phone]           Patient Discharge Instructions   Our goal is to set you up for success. This packet includes information on your condition, medications, and your home care.  It will help you care for yourself to prevent having to return to the hospital.     Please ask your nurse if you have any questions.      There are many details to remember when preparing to leave the hospital. Here is what you will need to do:    1. Take your medicine. If you are prescribed medications, review your Medication List on the following pages. You may have new medications to  at the pharmacy and others that you'll need to stop taking. Review the instructions for how and when to take your medications. Talk with your doctor or nurses if you are unsure of what to do.     2. Go to your follow-up appointments. Specific follow-up information is listed in the following pages. Your may be contacted by a nurse or clinical provider about future appointments. Be sure we have all of the phone numbers to reach you. Please contact your provider's office if you are unable to make an appointment.     3. Watch for warning signs. Your doctor or nurse will give you detailed warning signs to watch for and when to call for assistance. These instructions may also include educational information about your condition. If you experience any of warning signs to your health, call your doctor.               ** Verify the list of medication(s) below is accurate and up to date. Carry this with you in case of emergency. If your medications have changed, please notify your healthcare provider.             Medication List      CONTINUE taking these medications        Additional Info                      celecoxib 200 MG capsule   Commonly known as:  CeleBREX   Quantity:  40 capsule   Refills:  0   Dose:  200 mg    Instructions:  Take 1 capsule (200 mg total) by  mouth 2 (two) times daily.     Begin Date    AM    Noon    PM    Bedtime       fluticasone 50 mcg/actuation nasal spray   Commonly known as:  FLONASE   Refills:  0      Begin Date    AM    Noon    PM    Bedtime       hydrochlorothiazide 25 MG tablet   Commonly known as:  HYDRODIURIL   Refills:  0      Begin Date    AM    Noon    PM    Bedtime       hydrocodone-acetaminophen 10-325mg  mg Tab   Commonly known as:  NORCO   Quantity:  60 tablet   Refills:  0    Instructions:  Take 1 tablet by mouth every 4-6 hours for pain.     Begin Date    AM    Noon    PM    Bedtime       loratadine 10 mg tablet   Commonly known as:  CLARITIN   Refills:  0   Dose:  10 mg    Instructions:  Take 10 mg by mouth once daily.     Begin Date    AM    Noon    PM    Bedtime       promethazine 25 MG tablet   Commonly known as:  PHENERGAN   Quantity:  30 tablet   Refills:  0   Dose:  25 mg    Instructions:  Take 1 tablet (25 mg total) by mouth every 6 (six) hours as needed for Nausea.     Begin Date    AM    Noon    PM    Bedtime                  Please bring to all follow up appointments:    1. A copy of your discharge instructions.  2. All medicines you are currently taking in their original bottles.  3. Identification and insurance card.    Please arrive 15 minutes ahead of scheduled appointment time.    Please call 24 hours in advance if you must reschedule your appointment and/or time.        Your Scheduled Appointments     Apr 26, 2017  1:00 PM CDT   New Physical Therapy Patient with Dea Cohn, PT   [facility]    [address]  [facility] [address]   [phone]            May 08, 2017  8:00 AM CDT   Post OP with Roberto Burnett III, PA-C   [facility] ([facility])    [address]  [facility] [address]   [phone]            Jun 05, 2017  7:30 AM CDT   Post OP with Radha Barclay MD   [facility] ([facility])    [address]  [facility] [address]   [phone]              Follow-up Information     Follow up with Radha Blanton PA-C.    Specialty:  Sports Medicine    Why:  as schedule preop    Contact information:    [address] [place] [id number] [phone]          Discharge Instructions     Future Orders    Activity as tolerated     Call MD for:  persistent nausea and vomiting or diarrhea     Call MD for:  redness, tenderness, or signs of infection (pain, swelling, redness, odor or green/yellow discharge around incision site)     Call MD for:  severe uncontrolled pain     Call MD for:  temperature >100.4     CRUTCHES FOR HOME USE     Questions:    Type:  Axillary    Height:  5' 6" (1.676 m)    Weight:  145.6 kg (320 lb 15.8 oz)    Does patient have medical equipment at home?:      Other:      Length of need (1-99 months):  1    Diet general     Questions:    Total calories:      Fat restriction, if any:      Protein restriction, if any:      Na restriction, if any:      Fluid restriction:      Additional restrictions:          Discharge Instructions            [address], [place]                                                                                          [phone]                   Postoperative Instructions for Knee Surgery                 Your Surgery Included:   Open               Arthroscopic   [] Ligament Repair       [] Diagnostic           [] ACL     [] PCL     [] MCL     [] PLLC      [x] Synovectomy / Plica Removal [] Meniscal Cartilage Repair / Transplantation      [] Lysis of Adhesions / Manipulation [] Articular Cartilage Repair      [] Interval Release           [] Microfracture       [] OATS   [] ACI      [x] Meniscectomy           [] Osteochondral Allograft      [] Meniscal Cartilage Repair  [] Patellar Realignment       [x] Debridement / Chondroplasty         [] Lateral Release   [] Ligament Repair      [] Articular Cartilage Repair    "       [] Extensor Mechanism             []   Microfracture  []  OATS [] Tendon Repair          [] Ligament Reconstruction          [] Patella                  [] Quadriceps             []   ACL    []   PCL  [] High Tibial Osteotomy       [x] Loose body removal  [] Joint Replacement                    [] Unicompartmental   [] Patellofemoral                    [] Total Knee                  Call our office ([phone]) immediately if you experience any of the following:       Excessive bleeding or pus like drainage at the incision site       Uncontrollable pain not relieved by pain medication       Excessive swelling or redness at the incision site       Fever above 101.5 degrees not controlled with Tylenol or Motrin       Shortness of Breath       Any foul odor or blistering from the surgery site    FOR EMERGENCIES: If any unusual problems or difficulties occur, call our office at [phone], or page the  at [phone] who will direct your call appropriately    1.   Pain Management: A cold therapy cuff, pain medications, local injections, and in some cases, regional anesthesia injections are used to manage your post-operative pain. The decision to use each of these options is based on their risks and benefits.     Medications: You were given one or more of the following medication prescriptions before leaving the hospital. Have the prescriptions filled at a pharmacy on your way home and follow the instructions on the bottles. If you need a refill, please call your pharmacy.      Narcotic Medication (usually Vicodin ES, Lortab, Percocet or Nucynta): Begin taking the medication before your knee starts to hurt. Some patients do not like to take any medication, but if you wait until your pain is severe before taking, you will be very uncomfortable for several hours waiting for the narcotic to work. Always take with food.     Nausea / Vomiting: For this issue, we prescribe Phenergan, use this  medication as directed.     Cold Therapy: You may have been sent home with a Privcap® cold therapy unit and wrap for your knee. Fill with ice and water to the indicated fill line and use throughout the day for the first two days and then as needed to help relieve pain and control swelling.      Regional Anesthesia Injections (Blocks): You may have been given a regional nerve block either before or after surgery. This may make your entire leg numb for 24-36 hours.                            * Proceed with caution when bearing weight on your leg.     2.   Diet: Eat a bland diet for the first day after surgery. Progress your diet as tolerated. Constipation may occur with Narcotic usage, contact our office if you are experiencing constipation.    3.   Activity: Limit your activity during the first 48 hours, keep your leg elevated with pillows under your heel. After the first 48 hours at home, increase your activity level based on your symptoms.    4.   Dressing Change: Remove the dressing on the 3rd day. It is normal for some blood to be seen on the dressings. It is also normal for you to see apparent bruising on the skin around your knee when you remove the dressing. If present, leave the steri-strip tape across the incisions. If you are concerned by the drainage or the appearance of your knee, please call one of the numbers listed below.    5.   Showering: You may shower on the 10th day after surgery if the wound is dry and clean, but do not let the wound soak in water until sutures are removed. Do not submerge in any water until after your postoperative appointment in clinic.    6.   Your procedure did not require a post-operative brace.    7.   Your procedure did not require a Continuous Passive Motion (CPM) device.    8.   Weight Bearing: You may have been sent home with crutches. If instructed (see below), use these crutches at all times unless at complete rest.      [] Non-weight bearing for     {NUMBER:05309}  "weeks (you may touch your toes to the floor)      [] Partial weight bearing for  {NUMBER:74130} weeks    [] 25% Body Weight   [] 50% Body Weight      [x] Full weight bearing            [x]  NOW    []  after {NUMBER:15227} weeks     9.  Knee Exercises: Begin these exercises the first day after surgery in order to help you regain your motion and strength. You may do the following marked exercises:     [x] Quad Sets - Begin activating your quadriceps muscle by driving your          knee downward into full knee extension while seated on a table or bed   with a towel rolled and propped under your heel     [x] Straight Leg Raise (SLR) - While eligio your quadriceps muscle, lift     your fully extended leg to the level of your non-operative knee (as shown)     [x] Heel Slides - With the knee straight, slide your heel slowly toward your   buttocks, hold at the endpoint for 10-15 seconds, then slowly straighten     [x] Ankle pumps - With your knee straight, move your ankle in a "pumping"    fashion to activate your calf and leg muscles      10.  Physical Therapy: Physical therapy is an essential component to your recovery from surgery. Your physical therapy will start in 1 days.    FIRST POSTOPERATIVE VISIT: As scheduled.           Primary Diagnosis     Your primary diagnosis was:  Internal Knee Problem      Admission Information     Date & Time Provider Department CSN    4/25/2017  5:04 AM Radha Barclay MD [facility] [id number]      Care Providers     Provider Role Specialty Primary office phone    Radha Barclay MD Attending Provider Sports Medicine [phone]    Radha Barclay MD Surgeon  Sports Medicine [phone]      Your Vitals Were     BP Pulse Temp Resp Height Weight    130/63 78 98.3 °F (36.8 °C) (Oral) 16 5' 6" (1.676 m) 145.6 kg (320 lb 15.8 oz)    Last Period SpO2 BMI          03/16/2017 93% 51.81 kg/m2        Recent Lab Values     No lab values to display.      Allergies as of " 4/25/2017        Reactions    Ultram [Tramadol] Hives      [facility]     [facility] Nurse Care Line - 24/7 Assistance  Unless otherwise directed by your provider, please contact [facility], our nurse care line that is available for 24/7 assistance.     Registered nurses in the [facility] provide clinical advisement, health education, appointment booking, and other advisory services.  Call for this free service at [phone].        Advance Directives     An advance directive is a document which, in the event you are no longer able to make decisions for yourself, tells your healthcare team what kind of treatment you do or do not want to receive, or who you would like to make those decisions for you.  If you do not currently have an advance directive, [facility] encourages you to create one.  For more information call:  [phone]), [phone] ([phone]),  or log on to [email or web address].        Smoking Cessation     If you would like to quit smoking:   You may be eligible for free services if you are a [place] resident and started smoking cigarettes before September 1, 1988.  Call the Smoking Cessation [employer] (S[employer]) toll free at (827) 072-8735 or ([phone] 209-5297.   Call 1-495-QUIT-NOW if you do not meet the above criteria.   Contact us via email: [email or web address]   View our website for more information: [email or web address]        Language Assistance Services     ATTENTION: Language assistance services are available, free of charge. Please call [phone].      ATENCIÓN: Si habla español, tiene a cline disposición servicios gratuitos de asistencia lingüística. Llame al [phone].     CHÚ Ý: N?u b?n nói Ti?ng Vi?t, có các d?ch v? h? tr? ngôn ng? mi?n phí dành cho b?n. G?i s? [phone].         [facility] complies with applicable Federal civil rights laws and does not discriminate on the basis of race, color,  national origin, age, disability, or sex.

## 2017-04-25 NOTE — INTERVAL H&P NOTE
The patient has been examined and the H&P has been reviewed:    I concur with the findings and no changes have occurred since H&P was written.    Anesthesia/Surgery risks, benefits and alternative options discussed and understood by patient/family.          Active Hospital Problems    Diagnosis  POA    Knee internal derangement [M23.90]  Yes      Resolved Hospital Problems    Diagnosis Date Resolved POA   No resolved problems to display.

## 2017-04-25 NOTE — OR NURSING
Reviewed  knee arthroscopy discharge instructions with the patient and significant other.  Instructed on the use of the polar ice and precautions.  The patient and partner verbalized understanding of instructions.

## 2017-04-25 NOTE — BRIEF OP NOTE
[facility]    Brief Operative Note     SUMMARY     Surgery Date: 4/25/2017     Surgeon(s) and Role:     * Radha Barclay MD - Primary    Assisting Surgeon: None    Pre-op Diagnosis:  Derangement, knee internal, right [M23.91]  Tear of lateral cartilage or meniscus of knee, current, right, initial encounter [S83.281A]    Post-op Diagnosis:  Post-Op Diagnosis Codes:     * Derangement, knee internal, right [M23.91]     * Tear of lateral cartilage or meniscus of knee, current, right, initial encounter [S83.281A]    Procedure: Procedure(s) (LRB):  ARTHROSCOPY-KNEE (Right)  CHONDROPLASTY-KNEE (Right)  SYNOVECTOMY-KNEE (Right)  ARTHROCENTESIS; AMNIOX (Right)    Anesthesia: General    Description of the findings of the procedure: See Dictation    Findings/Key Components: see op note    Estimated Blood Loss: * No values recorded between 4/25/2017  7:27 AM and 4/25/2017  8:50 AM *         Specimens:   Specimen     None          Disposition: Patient tolerated the procedure well and was transferred to PACU in stable condition.      Discharge Note    SUMMARY     Admit Date: 4/25/2017    Discharge Date and Time:   04/25/2017 8:50 AM    Pre-op Diagnosis:  Derangement, knee internal, right [M23.91]  Tear of lateral cartilage or meniscus of knee, current, right, initial encounter [S83.281A]    Post-op Diagnosis:  Post-Op Diagnosis Codes:     * Derangement, knee internal, right [M23.91]     * Tear of lateral cartilage or meniscus of knee, current, right, initial encounter [S83.281A]    Procedure: Procedure(s) (LRB):  ARTHROSCOPY-KNEE (Right)  CHONDROPLASTY-KNEE (Right)  SYNOVECTOMY-KNEE (Right)  ARTHROCENTESIS; AMNIOX (Right)    Hospital Course (synopsis of major diagnoses, care, treatment, and services provided during the course of the hospital stay): Patient underwent outpatient knee surgery and was transferred to PACU in stable condition.  In PACU, patient received appropriate post-operative care and discharged home  "with plans for physical therapy and follow-up with the operative surgeon.    Diet: Regular       Final Diagnosis: Post-Op Diagnosis Codes:     * Derangement, knee internal, right [M23.91]     * Tear of lateral cartilage or meniscus of knee, current, right, initial encounter [S83.281A]    Disposition: Home or Self Care    Follow Up/Patient Instructions:     Medications:  Reconciled Home Medications:   Current Discharge Medication List      CONTINUE these medications which have NOT CHANGED    Details   fluticasone (FLONASE) 50 mcg/actuation nasal spray       hydrochlorothiazide (HYDRODIURIL) 25 MG tablet       loratadine (CLARITIN) 10 mg tablet Take 10 mg by mouth once daily.      celecoxib (CELEBREX) 200 MG capsule Take 1 capsule (200 mg total) by mouth 2 (two) times daily.  Qty: 40 capsule, Refills: 0    Associated Diagnoses: Knee internal derangement, right      hydrocodone-acetaminophen 10-325mg (NORCO)  mg Tab Take 1 tablet by mouth every 4-6 hours for pain.  Qty: 60 tablet, Refills: 0    Associated Diagnoses: Knee internal derangement, right      promethazine (PHENERGAN) 25 MG tablet Take 1 tablet (25 mg total) by mouth every 6 (six) hours as needed for Nausea.  Qty: 30 tablet, Refills: 0    Associated Diagnoses: Knee internal derangement, right             Discharge Procedure Orders  CRUTCHES FOR HOME USE   Order Specific Question Answer Comments   Type: Axillary    Height: 5' 6" (1.676 m)    Weight: 145.6 kg (320 lb 15.8 oz)    Length of need (1-99 months): 1      Diet general     Activity as tolerated     Call MD for:  temperature >100.4     Call MD for:  persistent nausea and vomiting or diarrhea     Call MD for:  severe uncontrolled pain     Call MD for:  redness, tenderness, or signs of infection (pain, swelling, redness, odor or green/yellow discharge around incision site)     Remove dressing in 72 hours       Follow-up Information     Follow up with Radha Blanton PA-C.    Specialty:  Sports " Medicine    Why:  as schedule preop    Contact information:    [address]  [place] [address]  [phone]

## 2017-04-25 NOTE — PLAN OF CARE
Jooj Boateng has met all discharge criteria from Phase II. Vital Signs are stable, ambulating  without difficulty. Discharge instructions given, patient verbalized understanding. Discharged from facility via wheelchair in stable condition.

## 2017-04-25 NOTE — DISCHARGE SUMMARY
[facility]    Brief Operative Note     SUMMARY     Surgery Date: 4/25/2017     Surgeon(s) and Role:     * Radha Barclay MD - Primary    Assisting Surgeon: None    Pre-op Diagnosis:  Derangement, knee internal, right [M23.91]  Tear of lateral cartilage or meniscus of knee, current, right, initial encounter [S83.281A]    Post-op Diagnosis:  Post-Op Diagnosis Codes:     * Derangement, knee internal, right [M23.91]     * Tear of lateral cartilage or meniscus of knee, current, right, initial encounter [S83.281A]    Procedure: Procedure(s) (LRB):  ARTHROSCOPY-KNEE (Right)  CHONDROPLASTY-KNEE (Right)  SYNOVECTOMY-KNEE (Right)  ARTHROCENTESIS; AMNIOX (Right)    Anesthesia: General    Description of the findings of the procedure: See Dictation    Findings/Key Components: see op note    Estimated Blood Loss: * No values recorded between 4/25/2017  7:27 AM and 4/25/2017  8:51 AM *         Specimens:   Specimen     None          Disposition: Patient tolerated the procedure well and was transferred to PACU in stable condition.      Discharge Note    SUMMARY     Admit Date: 4/25/2017    Discharge Date and Time:   04/25/2017 8:51 AM    Pre-op Diagnosis:  Derangement, knee internal, right [M23.91]  Tear of lateral cartilage or meniscus of knee, current, right, initial encounter [S83.281A]    Post-op Diagnosis:  Post-Op Diagnosis Codes:     * Derangement, knee internal, right [M23.91]     * Tear of lateral cartilage or meniscus of knee, current, right, initial encounter [S83.281A]    Procedure: Procedure(s) (LRB):  ARTHROSCOPY-KNEE (Right)  CHONDROPLASTY-KNEE (Right)  SYNOVECTOMY-KNEE (Right)  ARTHROCENTESIS; AMNIOX (Right)    Hospital Course (synopsis of major diagnoses, care, treatment, and services provided during the course of the hospital stay): Patient underwent outpatient knee surgery and was transferred to PACU in stable condition.  In PACU, patient received appropriate post-operative care and discharged home  "with plans for physical therapy and follow-up with the operative surgeon.    Diet: Regular       Final Diagnosis: Post-Op Diagnosis Codes:     * Derangement, knee internal, right [M23.91]     * Tear of lateral cartilage or meniscus of knee, current, right, initial encounter [S83.281A]    Disposition: Home or Self Care    Follow Up/Patient Instructions:     Medications:  Reconciled Home Medications:   Current Discharge Medication List      CONTINUE these medications which have NOT CHANGED    Details   fluticasone (FLONASE) 50 mcg/actuation nasal spray       hydrochlorothiazide (HYDRODIURIL) 25 MG tablet       loratadine (CLARITIN) 10 mg tablet Take 10 mg by mouth once daily.      celecoxib (CELEBREX) 200 MG capsule Take 1 capsule (200 mg total) by mouth 2 (two) times daily.  Qty: 40 capsule, Refills: 0    Associated Diagnoses: Knee internal derangement, right      hydrocodone-acetaminophen 10-325mg (NORCO)  mg Tab Take 1 tablet by mouth every 4-6 hours for pain.  Qty: 60 tablet, Refills: 0    Associated Diagnoses: Knee internal derangement, right      promethazine (PHENERGAN) 25 MG tablet Take 1 tablet (25 mg total) by mouth every 6 (six) hours as needed for Nausea.  Qty: 30 tablet, Refills: 0    Associated Diagnoses: Knee internal derangement, right             Discharge Procedure Orders  CRUTCHES FOR HOME USE   Order Specific Question Answer Comments   Type: Axillary    Height: 5' 6" (1.676 m)    Weight: 145.6 kg (320 lb 15.8 oz)    Length of need (1-99 months): 1      Diet general     Activity as tolerated     Call MD for:  temperature >100.4     Call MD for:  persistent nausea and vomiting or diarrhea     Call MD for:  severe uncontrolled pain     Call MD for:  redness, tenderness, or signs of infection (pain, swelling, redness, odor or green/yellow discharge around incision site)     Remove dressing in 72 hours       Follow-up Information     Follow up with Radha Blanton PA-C.    Specialty:  Sports " Medicine    Why:  as schedule preop    Contact information:    [address]  [place] [address]  [phone]

## 2017-04-25 NOTE — OR NURSING
Spoke with dr blake about ultram allergy and cross referenced dilaudid and percolone. Dr blake said to give meds dilaudid and percolnoe as ordered on pacu orders.

## 2017-04-25 NOTE — ANESTHESIA POSTPROCEDURE EVALUATION
"Anesthesia Post Evaluation    Patient: Jojo Boateng    Procedure(s) Performed: Procedure(s) (LRB):  CHONDROPLASTY-KNEE (Right)  SYNOVECTOMY-KNEE (Right)  ARTHROCENTESIS; AMNIOX (Right)  ARTHROSCOPY-MENISCECTOMY (Right)  REMOVAL-LOOSE-BODY-ARTHROSCOPIC (Right)    Final Anesthesia Type: general  Patient location during evaluation: PACU  Patient participation: Yes- Able to Participate  Level of consciousness: awake and alert  Post-procedure vital signs: reviewed and stable  Pain management: adequate  Airway patency: patent  PONV status at discharge: No PONV  Anesthetic complications: no      Cardiovascular status: blood pressure returned to baseline  Respiratory status: unassisted, spontaneous ventilation and room air  Hydration status: euvolemic  Follow-up not needed.        Visit Vitals    /80    Pulse 72    Temp 36.8 °C (98.3 °F) (Oral)    Resp 16    Ht 5' 6" (1.676 m)    Wt (!) 145.6 kg (320 lb 15.8 oz)    LMP 03/16/2017    SpO2 95%    Breastfeeding No    BMI 51.81 kg/m2       Pain/Luz Score: Pain Assessment Performed: Yes (4/25/2017 10:05 AM)  Presence of Pain: complains of pain/discomfort (4/25/2017 10:35 AM)  Pain Rating Prior to Med Admin: 5 (4/25/2017  9:42 AM)  Pain Rating Post Med Admin: 2 (4/25/2017 10:35 AM)  Luz Score: 10 (4/25/2017 10:35 AM)      "

## 2017-04-26 ENCOUNTER — CLINICAL SUPPORT (OUTPATIENT)
Dept: REHABILITATION | Facility: HOSPITAL | Age: 32
End: 2017-04-26
Attending: ORTHOPAEDIC SURGERY
Payer: COMMERCIAL

## 2017-04-26 DIAGNOSIS — M25.561 ACUTE PAIN OF RIGHT KNEE: ICD-10-CM

## 2017-04-26 PROCEDURE — 97110 THERAPEUTIC EXERCISES: CPT

## 2017-04-26 PROCEDURE — 97164 PT RE-EVAL EST PLAN CARE: CPT

## 2017-04-27 ENCOUNTER — TELEPHONE (OUTPATIENT)
Dept: SPORTS MEDICINE | Facility: CLINIC | Age: 32
End: 2017-04-27

## 2017-04-27 ENCOUNTER — PATIENT MESSAGE (OUTPATIENT)
Dept: SPORTS MEDICINE | Facility: CLINIC | Age: 32
End: 2017-04-27

## 2017-04-27 PROBLEM — M25.561 RIGHT KNEE PAIN: Status: ACTIVE | Noted: 2017-04-27

## 2017-04-27 NOTE — PROGRESS NOTES
Physical Therapy Evaluation    Name: Jojo Boateng  Clinic Number: [id number]      Diagnosis:   Encounter Diagnosis   Name Primary?    Acute pain of right knee      Physician: Radha Barclay MD  Treatment Orders: PT Eval and Treat    Past Medical History:   Diagnosis Date    Hypertension      Current Outpatient Prescriptions   Medication Sig    celecoxib (CELEBREX) 200 MG capsule Take 1 capsule (200 mg total) by mouth 2 (two) times daily.    fluticasone (FLONASE) 50 mcg/actuation nasal spray     hydrochlorothiazide (HYDRODIURIL) 25 MG tablet     hydrocodone-acetaminophen 10-325mg (NORCO)  mg Tab Take 1 tablet by mouth every 4-6 hours for pain.    loratadine (CLARITIN) 10 mg tablet Take 10 mg by mouth once daily.    promethazine (PHENERGAN) 25 MG tablet Take 1 tablet (25 mg total) by mouth every 6 (six) hours as needed for Nausea.     No current facility-administered medications for this visit.      Review of patient's allergies indicates:   Allergen Reactions    Ultram [tramadol] Hives     Precautions: S/P right knee partial meniscectomy 4/25/2017    Evaluation Date: 4/21/2017  Visit # authorized: 1/20  Authorization period: 12/31/2017    Subjective     Onset/JOSE ALEJANDRO: Pre-op    Primary concern/ Chief complaints:    Jojo is a 31 y.o. female that presents to [facility] secondary to right knee pain pre-hab for partial meniscectomy scheduled 4/25/2017.  Pt states she originally hurt her knee in boot [employer] when she was jumping and squatting a lot a few weeks ago.  X-ray and MRI was taken and revealed lateral meniscus tear and chondral damage. Pt uses rest, ice and ibuprofen to control symptoms but doesn't really have difficulty unless she squats down. 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 Pt denies numbness and tingling in R LE. No cultural, environmental, or spiritual barriers identified to treatment or  learning.    Pain Scale: Jojo rates pain on a scale of 0-10 to be 8 at worst; 3 currently; 3 at best .    Aggravates: Squatting, prolonged walking and standing, jumping  Relieves: Rest, ice, avoiding painful activities    Patient Goals: Return to full functional abilities and ability to work out    Objective     Observation: Patient is ambulating with mildly antalgic gait pattern.  She is morbidly obese.    Functional tests:    Not performed 2/2 pre-hab.    Range of Motion:   Knee Right active Left Active   Flexion 115 120   Extension 0 0     Lower Extremity Strength:  Right LE  Left LE    Quadriceps: 3/5 Quadriceps: 4+/5   Hamstrings: 3/5 Hamstrings: 4+/5   Hip flexion (supine): 4/5 Hip flexion (supine): 4/5   Hip extension:  3+/5 Hip extension: 3+/5   Posterior Glute Med: 3/5 PGM:  3/5     Special Tests:  None performed 2/2 pre-op.    Joint Mobility: Grossly hypomobile as expected; limited by pain.     Palpation: Point tenderness surrounding entire joint and patella; especially lateral joint line.    Sensation: Intact.    Flexibility:    Ely's test: R = Severely restricted ; L = Mildly restricted    Hamstrings: R = Moderately restricted ; L = Moderately restricted     Edema: Mild edema as expected pre-op.    Functional Limitations Reports - G Codes  Category: Mobility  Tool: FOTO Knee (not performed pre-op)    PT Evaluation Completed? Yes  Discussed Plan of Care with patient: Yes    TREATMENT:  Jojo received therapeutic exercises to develop strength and endurance, flexibility for 20 minutes including:   Seated HS/Gastroc Stretch w/ Strap 1'x5  Quad Sets with towel under knee 5sh x 20  Heel Slides w/ Strap as tolerated 5sh x 20 (as tolerated)     Pt. Received cold pack x 10 min to right knee following treatment.    Instructed pt. regarding: Proper technique with all exercises. Pt demonstrated good understanding of the education provided. Jojo demonstrated good return demonstration of activities.    Assessment      This is a 31 y.o. female referred to outpatient physical therapy and presents with a medical diagnosis of right lateral meniscus tear.  Patient presents with signs and symptoms consistent with MRI confirmed findings.  Patient's chief complaint is pain and inability to squat down.  Patient will benefit from skilled physical therapy services, specifically increased range of motion, increased flexibility, progressing to strengthening, functional mobility, and higher-level activities as desired. The following goals were discussed with the patient and patient is in agreement with them as to be addressed in the treatment plan. Patient was given a HEP consisting of exercises listed above. Patient verbally understood the instructions as they were given and demonstrated proper form and technique during therapy. Pt was advised to perform these exercises free of pain, and to stop performing them if pain occurs.     Medical necessity is demonstrated by the following IMPAIRMENTS/PROBLEM LIST:   1)Increase in pain level limiting function   2)Decreased range of motion   3)Decreased strength   4)Decreased functional mobility   5)Lack of HEP    GOALS: Short Term Goals: 8-10 weeks  1.  Report decreased right knee pain  < / =  3/10  to increase tolerance for ADLs, ambulation and return to fitness activities as desired.  2.  Increase knee ROM to equal opposite knee in order to be able to perform ADLs without difficulty.  3.  Increase strength to 4+/5 grossly to increase tolerance for ADL and work activities.  4.  Pt to tolerate HEP to improve ROM and independence with ADL's.  5.  Patient will report ability to participate in desired fitness and work activities with no pain or difficulty.    Plan     Pt will be treated by physical therapy 1-2 times a week for Pt Education, HEP, therapeutic exercises, neuromuscular re-education, manual therapy, joint mobilizations, modalities prn to achieve established goals. Jojo may at times be  seen by a PTA as part of the Rehab Team.     Cont PT for 8-10 weeks.     Dea Cohn, PT, DPT        I certify the need for these services furnished under this plan of treatment and while under my care.______________________________ Physician/Referring Practitioner  Date of Signature

## 2017-04-27 NOTE — TELEPHONE ENCOUNTER
called the pt to let her know that Dea from PT came over to talk to me this morning about what was going on with her knee since she walked a lot yesterday and it is bothering her today. Told her that she can take ibuprofen in b/w her pain meds and to ice and elevate and use the walker. Also told her she needs to take it easy today since she over did it yesterday. She stated she understood and will call if she has any other issues.

## 2017-04-28 ENCOUNTER — CLINICAL SUPPORT (OUTPATIENT)
Dept: REHABILITATION | Facility: HOSPITAL | Age: 32
End: 2017-04-28
Attending: ORTHOPAEDIC SURGERY
Payer: COMMERCIAL

## 2017-04-28 DIAGNOSIS — M25.561 ACUTE PAIN OF RIGHT KNEE: Primary | ICD-10-CM

## 2017-04-28 PROCEDURE — 97110 THERAPEUTIC EXERCISES: CPT

## 2017-04-28 NOTE — PROGRESS NOTES
Physical Therapy Re-Eval    Name: Jojo Boateng  [facility] Number: [id number]      Diagnosis:   Encounter Diagnosis   Name Primary?    Acute pain of right knee      Physician: Rayne Mcgee MD  Treatment Orders: PT Eval and Treat    Past Medical History:   Diagnosis Date    Hypertension      Current Outpatient Prescriptions   Medication Sig    celecoxib (CELEBREX) 200 MG capsule Take 1 capsule (200 mg total) by mouth 2 (two) times daily.    fluticasone (FLONASE) 50 mcg/actuation nasal spray     hydrochlorothiazide (HYDRODIURIL) 25 MG tablet     hydrocodone-acetaminophen 10-325mg (NORCO)  mg Tab Take 1 tablet by mouth every 4-6 hours for pain.    loratadine (CLARITIN) 10 mg tablet Take 10 mg by mouth once daily.    promethazine (PHENERGAN) 25 MG tablet Take 1 tablet (25 mg total) by mouth every 6 (six) hours as needed for Nausea.     No current facility-administered medications for this visit.      Review of patient's allergies indicates:   Allergen Reactions    Ultram [tramadol] Hives     Precautions: S/P right knee partial meniscectomy 4/25/2017    Evaluation Date: 4/25/2017  Visit # authorized: 2/20  Authorization period: 12/31/2017    Subjective     Patient states she is not in a lot of pain since surgery and she is surprised how well she is able to move.     Patient Goals: Return to full functional abilities and ability to work out    Objective     Observation: Patient is ambulating with mildly antalgic gait pattern and brought FWW but is not using it very much.  She is morbidly obese.    Functional tests:    Not performed 2/2 POD#1.    Range of Motion:   Knee Right active Left Active   Flexion 90 120   Extension -5 0     Lower Extremity Strength:  Right LE  Left LE    Quadriceps: Trace Quadriceps: 4+/5   Hamstrings: 3/5 Hamstrings: 4+/5   Hip flexion (supine): 4/5 Hip flexion (supine): 4/5   Hip extension:  3+/5 Hip extension: 3+/5   Posterior Glute Med: 3/5 PGM:  3/5     Special Tests:  None  performed 2/2 POD#1.    Joint Mobility: Grossly hypomobile as expected; limited by pain.     Palpation: Point tenderness surrounding entire joint and patella; especially lateral joint line.    Sensation: Intact.    Flexibility:    Ely's test: R = Severely restricted ; L = Mildly restricted    Hamstrings: R = Severely restricted ; L = Moderately restricted     Edema: Moderate joint effusion as expected POD#1.    Functional Limitations Reports - G Codes  Category: Mobility  Tool: FOTO Knee 38%    TREATMENT:  Jojo received therapeutic exercises to develop strength and endurance, flexibility for 35 minutes including:   Seated HS/Gastroc Stretch w/ Strap 1'x5  Quad Sets with towel under knee 5sh x 20  Supine HS Curls w/ SB for flx stretching 5'  Recumbent Bike Rocking as tolerated 10'     Pt. Received cold pack x 10 min to right knee following treatment.    Instructed pt. regarding: Proper technique with all exercises. Pt demonstrated good understanding of the education provided. Jojo demonstrated good return demonstration of activities.    Assessment     Today's Assessment:  Patient reported a little soreness at end of today's treatment but did not have increased pain.  Discussed with patient to not do too much since she feels so well POD#1.  Add SLR if tolerated next visit.    This is a 31 y.o. female referred to outpatient physical therapy and presents with a medical diagnosis of right lateral meniscus tear.  Patient presents with signs and symptoms consistent with MRI confirmed findings.  Patient's chief complaint is pain and inability to squat down.  Patient will benefit from skilled physical therapy services, specifically increased range of motion, increased flexibility, progressing to strengthening, functional mobility, and higher-level activities as desired. The following goals were discussed with the patient and patient is in agreement with them as to be addressed in the treatment plan. Patient was given a  HEP consisting of exercises listed above. Patient verbally understood the instructions as they were given and demonstrated proper form and technique during therapy. Pt was advised to perform these exercises free of pain, and to stop performing them if pain occurs.     Medical necessity is demonstrated by the following IMPAIRMENTS/PROBLEM LIST:   1)Increase in pain level limiting function   2)Decreased range of motion   3)Decreased strength   4)Decreased functional mobility   5)Lack of HEP    GOALS: Short Term Goals: 8-10 weeks  1.  Report decreased right knee pain  < / =  3/10  to increase tolerance for ADLs, ambulation and return to fitness activities as desired.  2.  Increase knee ROM to equal opposite knee in order to be able to perform ADLs without difficulty.  3.  Increase strength to 4+/5 grossly to increase tolerance for ADL and work activities.  4.  Pt to tolerate HEP to improve ROM and independence with ADL's.  5.  Patient will report ability to participate in desired fitness and work activities with no pain or difficulty.    Plan     Pt will be treated by physical therapy 1-2 times a week for Pt Education, HEP, therapeutic exercises, neuromuscular re-education, manual therapy, joint mobilizations, modalities prn to achieve established goals. Jojo may at times be seen by a PTA as part of the Rehab Team.     Cont PT for 8-10 weeks.     Dea Cohn, PT, DPT        I certify the need for these services furnished under this plan of treatment and while under my care.______________________________ Physician/Referring Practitioner  Date of Signature

## 2017-04-28 NOTE — PROGRESS NOTES
Physical Therapy progress Note    Name: Jojo Boateng  Clinic Number: [id number]      Diagnosis:   Encounter Diagnosis   Name Primary?    Acute pain of right knee Yes     Physician: Radha Barclay MD  Treatment Orders: PT Eval and Treat    Past Medical History:   Diagnosis Date    Hypertension      Current Outpatient Prescriptions   Medication Sig    celecoxib (CELEBREX) 200 MG capsule Take 1 capsule (200 mg total) by mouth 2 (two) times daily.    fluticasone (FLONASE) 50 mcg/actuation nasal spray     hydrochlorothiazide (HYDRODIURIL) 25 MG tablet     hydrocodone-acetaminophen 10-325mg (NORCO)  mg Tab Take 1 tablet by mouth every 4-6 hours for pain.    loratadine (CLARITIN) 10 mg tablet Take 10 mg by mouth once daily.    promethazine (PHENERGAN) 25 MG tablet Take 1 tablet (25 mg total) by mouth every 6 (six) hours as needed for Nausea.     No current facility-administered medications for this visit.      Review of patient's allergies indicates:   Allergen Reactions    Ultram [tramadol] Hives     Precautions: S/P right knee partial meniscectomy 4/25/2017    Evaluation Date: 4/25/2017  Visit # authorized: 3/20  Authorization period: 12/31/2017    Subjective     Pt reports w/ a constant 5/10 pn in R knee.      Patient Goals: Return to full functional abilities and ability to work out    Objective     Observation: Patient is ambulating with mildly antalgic gait pattern and brought FWW but is not using it very much.  She is morbidly obese.    Functional tests: 4/26/17   Not performed 2/2 POD#1.    Range of Motion: 4/26/17  Knee Right active Left Active   Flexion 90 120   Extension -5 0     Lower Extremity Strength:4/26/17  Right LE  Left LE    Quadriceps: Trace Quadriceps: 4+/5   Hamstrings: 3/5 Hamstrings: 4+/5   Hip flexion (supine): 4/5 Hip flexion (supine): 4/5   Hip extension:  3+/5 Hip extension: 3+/5   Posterior Glute Med: 3/5 PGM:  3/5     4/26/17:  Special Tests:  None performed 2/2  POD#1.    Joint Mobility: Grossly hypomobile as expected; limited by pain.     Palpation: Point tenderness surrounding entire joint and patella; especially lateral joint line.    Sensation: Intact.    Flexibility:    Ely's test: R = Severely restricted ; L = Mildly restricted    Hamstrings: R = Severely restricted ; L = Moderately restricted     Edema: Moderate joint effusion as expected POD#1.    Functional Limitations Reports - G Codes  Category: Mobility  Tool: FOTO Knee 38%    TREATMENT:  Jojo received therapeutic exercises to develop strength and endurance, flexibility for 55 minutes including:     Seated HS/Gastroc Stretch w/ Strap 1'x5  Quad Sets with towel under knee 5sh x 20  Supine HS Curls w/ SB for flx stretching 5'  Recumbent Bike Rocking as tolerated 10'  Gait training w/ B axillary crutches x 20 ft  APs otb 3 x 10    Pt. Received cold pack x 10 min to right knee following treatment.  Dressing changed by PT.   Pt received axillary crutches for ambulating.    Instructed pt. regarding: Proper technique with all exercises. Pt demonstrated good understanding of the education provided. Jojo demonstrated good return demonstration of activities.    Assessment     Pt forest tx well w/ no increase in pn.  Pt displayed increased endurance during therex w/ VCs for technique.  Pt instructed to cont HEP.  Cont to progress as forest.      This is a 31 y.o. female referred to outpatient physical therapy and presents with a medical diagnosis of right lateral meniscus tear.  Patient presents with signs and symptoms consistent with MRI confirmed findings.  Patient's chief complaint is pain and inability to squat down.  Patient will benefit from skilled physical therapy services, specifically increased range of motion, increased flexibility, progressing to strengthening, functional mobility, and higher-level activities as desired. The following goals were discussed with the patient and patient is in agreement with them as  to be addressed in the treatment plan. Patient was given a HEP consisting of exercises listed above. Patient verbally understood the instructions as they were given and demonstrated proper form and technique during therapy. Pt was advised to perform these exercises free of pain, and to stop performing them if pain occurs.     Medical necessity is demonstrated by the following IMPAIRMENTS/PROBLEM LIST:   1)Increase in pain level limiting function   2)Decreased range of motion   3)Decreased strength   4)Decreased functional mobility   5)Lack of HEP    GOALS: Short Term Goals: 8-10 weeks  1.  Report decreased right knee pain  < / =  3/10  to increase tolerance for ADLs, ambulation and return to fitness activities as desired.  2.  Increase knee ROM to equal opposite knee in order to be able to perform ADLs without difficulty.  3.  Increase strength to 4+/5 grossly to increase tolerance for ADL and work activities.  4.  Pt to tolerate HEP to improve ROM and independence with ADL's.  5.  Patient will report ability to participate in desired fitness and work activities with no pain or difficulty.    Plan     Pt will be treated by physical therapy 1-2 times a week for Pt Education, HEP, therapeutic exercises, neuromuscular re-education, manual therapy, joint mobilizations, modalities prn to achieve established goals. Jojo may at times be seen by a PTA as part of the Rehab Team.     Cont PT for 8-10 weeks.     Natanael Durham PTA

## 2017-05-04 ENCOUNTER — CLINICAL SUPPORT (OUTPATIENT)
Dept: REHABILITATION | Facility: HOSPITAL | Age: 32
End: 2017-05-04
Attending: ORTHOPAEDIC SURGERY
Payer: COMMERCIAL

## 2017-05-04 DIAGNOSIS — M25.561 ACUTE PAIN OF RIGHT KNEE: ICD-10-CM

## 2017-05-04 PROCEDURE — 97110 THERAPEUTIC EXERCISES: CPT

## 2017-05-04 NOTE — PROGRESS NOTES
Physical Therapy Visit Note    Name: Jojo Boateng  Clinic Number: [id number]      Diagnosis:   Encounter Diagnosis   Name Primary?    Acute pain of right knee      Physician: Radha Barclay MD  Treatment Orders: PT Eval and Treat    Past Medical History:   Diagnosis Date    Hypertension      Current Outpatient Prescriptions   Medication Sig    celecoxib (CELEBREX) 200 MG capsule Take 1 capsule (200 mg total) by mouth 2 (two) times daily.    fluticasone (FLONASE) 50 mcg/actuation nasal spray     hydrochlorothiazide (HYDRODIURIL) 25 MG tablet     hydrocodone-acetaminophen 10-325mg (NORCO)  mg Tab Take 1 tablet by mouth every 4-6 hours for pain.    loratadine (CLARITIN) 10 mg tablet Take 10 mg by mouth once daily.    promethazine (PHENERGAN) 25 MG tablet Take 1 tablet (25 mg total) by mouth every 6 (six) hours as needed for Nausea.     No current facility-administered medications for this visit.      Review of patient's allergies indicates:   Allergen Reactions    Ultram [tramadol] Hives     Precautions: S/P right knee partial meniscectomy 4/25/2017    Today's Date:  5/4/2017  Evaluation Date: 4/25/2017  Visit # authorized: 4/20  Authorization period: 12/31/2017  Time In:  1145  Time Out:  1240    Subjective     Patient states she is doing okay.  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      Patient Goals: Return to full functional abilities and ability to work out    Objective     Observation: Patient is ambulating with mildly antalgic gait pattern and brought FWW but is not using it very much.  She is morbidly obese.    Functional tests: 4/26/17   Not performed 2/2 POD#1.    Range of Motion:   Knee Right active Left Active   Flexion 100 120   Extension -3 0     Lower Extremity Strength:4/26/17  Right LE  Left LE    Quadriceps: Trace Quadriceps: 4+/5   Hamstrings: 3/5 Hamstrings: 4+/5   Hip flexion (supine): 4/5 Hip flexion (supine): 4/5   Hip extension:  3+/5  Hip extension: 3+/5   Posterior Glute Med: 3/5 PGM:  3/5     4/26/17:  Special Tests:  None performed 2/2 POD#1.    Joint Mobility: Grossly hypomobile as expected; limited by pain.     Palpation: Point tenderness surrounding entire joint and patella; especially lateral joint line.    Sensation: Intact.    Flexibility:    Ely's test: R = Severely restricted ; L = Mildly restricted    Hamstrings: R = Severely restricted ; L = Moderately restricted     Edema: Moderate joint effusion as expected POD#1.    Functional Limitations Reports - G Codes  Category: Mobility  Tool: FOTO Knee 38%    TREATMENT:  Jojo received therapeutic exercises to develop strength and endurance, flexibility for 55 minutes including:   Recumbent Bike L3/10'  Standing Gastroc/HS Stretch 1'x3 ea.  SLR 4-way 20x ea.  Supine HS Curls w/ SB for flx stretching 5'  SB Bridges 5sh x 15    Not today:  APs otb 3 x 10    Pt. Received cold pack x 10 min to right knee following treatment.    Instructed pt. regarding: Proper technique with all exercises. Pt demonstrated good understanding of the education provided. Jojo demonstrated good return demonstration of activities.    Assessment     Patient reported a little increased soreness 4/10 after exercises but just 2/10 after ice.  She stated it was feeling fine.      This is a 31 y.o. female referred to outpatient physical therapy and presents with a medical diagnosis of right lateral meniscus tear.  Patient presents with signs and symptoms consistent with MRI confirmed findings.  Patient's chief complaint is pain and inability to squat down.  Patient will benefit from skilled physical therapy services, specifically increased range of motion, increased flexibility, progressing to strengthening, functional mobility, and higher-level activities as desired. The following goals were discussed with the patient and patient is in agreement with them as to be addressed in the treatment plan. Patient was given a  HEP consisting of exercises listed above. Patient verbally understood the instructions as they were given and demonstrated proper form and technique during therapy. Pt was advised to perform these exercises free of pain, and to stop performing them if pain occurs.     Medical necessity is demonstrated by the following IMPAIRMENTS/PROBLEM LIST:   1)Increase in pain level limiting function   2)Decreased range of motion   3)Decreased strength   4)Decreased functional mobility   5)Lack of HEP    GOALS: Short Term Goals: 8-10 weeks  1.  Report decreased right knee pain  < / =  3/10  to increase tolerance for ADLs, ambulation and return to fitness activities as desired.  2.  Increase knee ROM to equal opposite knee in order to be able to perform ADLs without difficulty.  3.  Increase strength to 4+/5 grossly to increase tolerance for ADL and work activities.  4.  Pt to tolerate HEP to improve ROM and independence with ADL's.  5.  Patient will report ability to participate in desired fitness and work activities with no pain or difficulty.    Plan     Pt will be treated by physical therapy 1-2 times a week for Pt Education, HEP, therapeutic exercises, neuromuscular re-education, manual therapy, joint mobilizations, modalities prn to achieve established goals. Jojo may at times be seen by a PTA as part of the Rehab Team.     Cont PT for 8-10 weeks.     Dea Cohn, PT, DPT

## 2017-05-08 ENCOUNTER — OFFICE VISIT (OUTPATIENT)
Dept: SPORTS MEDICINE | Facility: CLINIC | Age: 32
End: 2017-05-08
Payer: COMMERCIAL

## 2017-05-08 VITALS
DIASTOLIC BLOOD PRESSURE: 87 MMHG | HEIGHT: 66 IN | SYSTOLIC BLOOD PRESSURE: 153 MMHG | HEART RATE: 85 BPM | WEIGHT: 293 LBS | BODY MASS INDEX: 47.09 KG/M2

## 2017-05-08 DIAGNOSIS — Z98.890 S/P ARTHROSCOPIC SURGERY OF RIGHT KNEE: ICD-10-CM

## 2017-05-08 DIAGNOSIS — G89.18 ACUTE POST-OPERATIVE PAIN: Primary | ICD-10-CM

## 2017-05-08 PROCEDURE — 99024 POSTOP FOLLOW-UP VISIT: CPT | Mod: S$GLB,,, | Performed by: PHYSICIAN ASSISTANT

## 2017-05-08 PROCEDURE — 99999 PR PBB SHADOW E&M-EST. PATIENT-LVL III: CPT | Mod: PBBFAC,,, | Performed by: PHYSICIAN ASSISTANT

## 2017-05-08 RX ORDER — CELECOXIB 200 MG/1
200 CAPSULE ORAL 2 TIMES DAILY
Qty: 30 CAPSULE | Refills: 0 | Status: SHIPPED | OUTPATIENT
Start: 2017-05-08 | End: 2018-08-06

## 2017-05-08 NOTE — MR AVS SNAPSHOT
[facility]  [address]  [facility] [address]  Phone: [phone]                  Jojo Boateng   2017 8:00 AM   Appointment    Description:  Female : 1985   Provider:  Roberto Burnett III, PA-C   Department:  [facility]                To Do List           Future Appointments        Provider Department Dept Phone    2017 8:00 AM Roberto Burnett III, PA-C [facility] [phone]    2017 12:00 PM Dea Termin, PT [facility] [phone]    2017 12:00 PM Dea Termin, PT [facility] [phone]    2017 12:00 PM Dea Termin, PT [facility] [phone]    2017 7:30 AM Radha Barclay MD [facility] [phone]      Goals (5 Years of Data)     None      [facility]     [facility] Line -  Assistance  Unless otherwise directed by your provider, please contact [facility], our nurse care line that is available for  assistance.     Registered nurses in the [facility] provide: appointment scheduling, clinical advisement, health education, and other advisory services.  Call: [phone] (toll free)               Medications           Message regarding Medications     Verify the changes and/or additions to your medication regime listed below are the same as discussed with your clinician today.  If any of these changes or additions are incorrect, please notify your healthcare provider.             Verify that the below list of medications is an accurate representation of the medications you are currently taking.  If none reported, the list may be blank. If incorrect, please contact your healthcare provider. Carry this list with you in case of emergency.           Current Medications     celecoxib (CELEBREX) 200 MG capsule Take 1 capsule (200 mg total) by mouth 2 (two) times daily.    fluticasone  (FLONASE) 50 mcg/actuation nasal spray     hydrochlorothiazide (HYDRODIURIL) 25 MG tablet     hydrocodone-acetaminophen 10-325mg (NORCO)  mg Tab Take 1 tablet by mouth every 4-6 hours for pain.    loratadine (CLARITIN) 10 mg tablet Take 10 mg by mouth once daily.    promethazine (PHENERGAN) 25 MG tablet Take 1 tablet (25 mg total) by mouth every 6 (six) hours as needed for Nausea.           Clinical Reference Information           Your Vitals Were     Last Period                   03/16/2017           Allergies as of 5/8/2017     Ultram [Tramadol]      Immunizations Administered on Date of Encounter - 5/8/2017     None      Language Assistance Services     ATTENTION: Language assistance services are available, free of charge. Please call [phone].      ATENCIÓN: Si arya rain, tiene a cline disposición servicios gratuitos de asistencia lingüística. Llame al [phone].     PETRONA Ý: N?u b?n nói Ti?ng Vi?t, có các d?ch v? h? tr? ngôn ng? mi?n phí dành cho b?n. G?i s? [phone].         [facility] complies with applicable Federal civil rights laws and does not discriminate on the basis of race, color, national origin, age, disability, or sex.

## 2017-05-08 NOTE — DISCHARGE INSTRUCTIONS
[address], [place]                                                                                          [phone]                   Postoperative Instructions for Knee Surgery                 Your Surgery Included:   Open               Arthroscopic   [] Ligament Repair       [] Diagnostic           [] ACL     [] PCL     [] MCL     [] PLLC      [x] Synovectomy / Plica Removal [] Meniscal Cartilage Repair / Transplantation      [] Lysis of Adhesions / Manipulation [] Articular Cartilage Repair      [] Interval Release           [] Microfracture       [] OATS   [] ACI      [x] Meniscectomy           [] Osteochondral Allograft      [] Meniscal Cartilage Repair  [] Patellar Realignment       [x] Debridement / Chondroplasty         [] Lateral Release   [] Ligament Repair      [] Articular Cartilage Repair          [] Extensor Mechanism             []   Microfracture  []  OATS [] Tendon Repair          [] Ligament Reconstruction          [] Patella                  [] Quadriceps             []   ACL    []   PCL  [] High Tibial Osteotomy       [x] Loose body removal  [] Joint Replacement                    [] Unicompartmental   [] Patellofemoral                    [] Total Knee                  Call our office ([phone]) immediately if you experience any of the following:       Excessive bleeding or pus like drainage at the incision site       Uncontrollable pain not relieved by pain medication       Excessive swelling or redness at the incision site       Fever above 101.5 degrees not controlled with Tylenol or Motrin       Shortness of Breath       Any foul odor or blistering from the surgery site    FOR EMERGENCIES: If any unusual problems or difficulties occur, call our office at [phone], or page the  at [phone] who will direct your call appropriately    1.   Pain Management: A cold therapy cuff, pain medications, local injections, and in some  cases, regional anesthesia injections are used to manage your post-operative pain. The decision to use each of these options is based on their risks and benefits.     Medications: You were given one or more of the following medication prescriptions before leaving the hospital. Have the prescriptions filled at a pharmacy on your way home and follow the instructions on the bottles. If you need a refill, please call your pharmacy.      Narcotic Medication (usually Vicodin ES, Lortab, Percocet or Nucynta): Begin taking the medication before your knee starts to hurt. Some patients do not like to take any medication, but if you wait until your pain is severe before taking, you will be very uncomfortable for several hours waiting for the narcotic to work. Always take with food.     Nausea / Vomiting: For this issue, we prescribe Phenergan, use this medication as directed.     Cold Therapy: You may have been sent home with a Megathread Care® cold therapy unit and wrap for your knee. Fill with ice and water to the indicated fill line and use throughout the day for the first two days and then as needed to help relieve pain and control swelling.      Regional Anesthesia Injections (Blocks): You may have been given a regional nerve block either before or after surgery. This may make your entire leg numb for 24-36 hours.                            * Proceed with caution when bearing weight on your leg.     2.   Diet: Eat a bland diet for the first day after surgery. Progress your diet as tolerated. Constipation may occur with Narcotic usage, contact our office if you are experiencing constipation.    3.   Activity: Limit your activity during the first 48 hours, keep your leg elevated with pillows under your heel. After the first 48 hours at home, increase your activity level based on your symptoms.    4.   Dressing Change: Remove the dressing on the 3rd day. It is normal for some blood to be seen on the dressings. It is also normal  "for you to see apparent bruising on the skin around your knee when you remove the dressing. If present, leave the steri-strip tape across the incisions. If you are concerned by the drainage or the appearance of your knee, please call one of the numbers listed below.    5.   Showering: You may shower on the 10th day after surgery if the wound is dry and clean, but do not let the wound soak in water until sutures are removed. Do not submerge in any water until after your postoperative appointment in clinic.    6.   Your procedure did not require a post-operative brace.    7.   Your procedure did not require a Continuous Passive Motion (CPM) device.    8.   Weight Bearing: You may have been sent home with crutches. If instructed (see below), use these crutches at all times unless at complete rest.      [] Non-weight bearing for     {NUMBER:48153} weeks (you may touch your toes to the floor)      [] Partial weight bearing for  {NUMBER:84628} weeks    [] 25% Body Weight   [] 50% Body Weight      [x] Full weight bearing            [x]  NOW    []  after {NUMBER:98064} weeks     9.  Knee Exercises: Begin these exercises the first day after surgery in order to help you regain your motion and strength. You may do the following marked exercises:     [x] Quad Sets - Begin activating your quadriceps muscle by driving your          knee downward into full knee extension while seated on a table or bed   with a towel rolled and propped under your heel     [x] Straight Leg Raise (SLR) - While eligio your quadriceps muscle, lift     your fully extended leg to the level of your non-operative knee (as shown)     [x] Heel Slides - With the knee straight, slide your heel slowly toward your   buttocks, hold at the endpoint for 10-15 seconds, then slowly straighten     [x] Ankle pumps - With your knee straight, move your ankle in a "pumping"    fashion to activate your calf and leg muscles      10.  Physical Therapy: Physical therapy " is an essential component to your recovery from surgery. Your physical therapy will start in 1 days.    FIRST POSTOPERATIVE VISIT: As scheduled.

## 2017-05-10 NOTE — PROGRESS NOTES
HISTORY OF PRESENT ILLNESS:   Pt is here today for first post-operative followup of knee arthroscopy.  She is doing well.  We have reviewed her findings and discussed plan of care and future treatment options.      Her pre-op knee pain has resolved and she reports that she only has slight soreness and feels great. She is no longer taking narcotics.     DATE OF PROCEDURE: 4/25/2017     ATTENDING SURGEON: Surgeon(s) and Role:  * Radha Barclay MD - Primary     PROCEDURES(S) PERFORMED:   1. Right  Arthroscopy, with meniscus repair (medial OR lateral) 12272  2. Right  Arthroscopy, debridement/shaving of articular cartilage (chondroplasty) 45372  3. Right  Arthroscopy, knee, for removal of loose body or foreign body 68140  4. Right  Arthroscopy, knee, synovectomy, limited 78036  5. Right  knee amniox arthrocentesis, 81553                                                                               PHYSICAL EXAMINATION:     Incision sites healed well  No evidence of any erythema, infection or induration  Range of motion 0-120 degrees  Minimal effusion  2+ DP pulse  No swelling, no calf tenderness  - Veronica's sign  Negative medial joint line tendernes  Moderate quad atrophy                                                                                 ASSESSMENT:                                                                                                                                               1. Status post above, doing well.        Sutures removed today.                                                                                                                            PLAN:                                                                                                                                                     1. Continue with PT. Refilled celebrex.   2. Emphasized quad function.  3. I have discussed return to activity in detail.  4.Patient will see us back at 6 week post-op marine.                                     5. All questions were answered and patient should contact us if she  has any questions or concerns in the interim.

## 2017-05-11 ENCOUNTER — CLINICAL SUPPORT (OUTPATIENT)
Dept: REHABILITATION | Facility: HOSPITAL | Age: 32
End: 2017-05-11
Attending: ORTHOPAEDIC SURGERY
Payer: COMMERCIAL

## 2017-05-11 DIAGNOSIS — M25.561 ACUTE PAIN OF RIGHT KNEE: ICD-10-CM

## 2017-05-11 PROCEDURE — 97110 THERAPEUTIC EXERCISES: CPT

## 2017-05-17 NOTE — PROGRESS NOTES
Physical Therapy Visit Note    Name: Jojo Boateng  Clinic Number: [id number]      Diagnosis:   Encounter Diagnosis   Name Primary?    Acute pain of right knee      Physician: Radha Barclay MD  Treatment Orders: PT Eval and Treat    Past Medical History:   Diagnosis Date    Hypertension      Current Outpatient Prescriptions   Medication Sig    celecoxib (CELEBREX) 200 MG capsule Take 1 capsule (200 mg total) by mouth 2 (two) times daily.    fluticasone (FLONASE) 50 mcg/actuation nasal spray     hydrochlorothiazide (HYDRODIURIL) 25 MG tablet     hydrocodone-acetaminophen 10-325mg (NORCO)  mg Tab Take 1 tablet by mouth every 4-6 hours for pain.    loratadine (CLARITIN) 10 mg tablet Take 10 mg by mouth once daily.    promethazine (PHENERGAN) 25 MG tablet Take 1 tablet (25 mg total) by mouth every 6 (six) hours as needed for Nausea.     No current facility-administered medications for this visit.      Review of patient's allergies indicates:   Allergen Reactions    Ultram [tramadol] Hives     Precautions: S/P right knee partial meniscectomy 4/25/2017    Today's Date:  5/11/2017  Evaluation Date: 4/25/2017  Visit # authorized: 5/20  Authorization period: 12/31/2017  Time In:  1100  Time Out:  1245    Subjective     Patient states she saw LAURO on Monday and was doing really well until yesterday when her knee started hurting her really badly especially when trying to bend it.  She does not know of anything in particular that she did that would have caused it.  Her pain is 8/10 and she feels like she can't put weight on it as well anymore but has not gone back to using her crutches.    Patient Goals: Return to full functional abilities and ability to work out    Objective     Observation: Patient is ambulating with mildly antalgic gait pattern and brought FWW but is not using it very much.  She is morbidly obese.    Functional tests: 4/26/17   Not performed 2/2 POD#1.    Range of Motion:   Knee Right  active Left Active   Flexion 100 120   Extension -3 0     Lower Extremity Strength:4/26/17  Right LE  Left LE    Quadriceps: Trace Quadriceps: 4+/5   Hamstrings: 3/5 Hamstrings: 4+/5   Hip flexion (supine): 4/5 Hip flexion (supine): 4/5   Hip extension:  3+/5 Hip extension: 3+/5   Posterior Glute Med: 3/5 PGM:  3/5     4/26/17:  Special Tests:  None performed 2/2 POD#1.    Joint Mobility: Grossly hypomobile as expected; limited by pain.     Palpation: Point tenderness surrounding entire joint and patella; especially lateral joint line.    Sensation: Intact.    Flexibility:    Ely's test: R = Severely restricted ; L = Mildly restricted    Hamstrings: R = Severely restricted ; L = Moderately restricted     Edema: Moderate joint effusion as expected POD#1.    Functional Limitations Reports - G Codes  Category: Mobility  Tool: FOTO Knee 38%    TREATMENT:  Jojo received therapeutic exercises to develop strength and endurance, flexibility for 15 minutes including:   Quad Sets 5sh x 20  Seated HS/Gastroc Stretch 3' as tolerated    Not today:  APs otb 3 x 10  Recumbent Bike L3/10'  Standing Gastroc/HS Stretch 1'x3 ea.  SLR 4-way 20x ea.  Supine HS Curls w/ SB for flx stretching 5'  SB Bridges 5sh x 15    Manual therapy for increased soft tissue and joint mobility and decreased pain for 15 minutes including:  Patellar mobs in all directions  Gentle patellar tendon mobs   STM surrounding joint and anterior knee as tolerated    Pt. Received cold pack x 10 min to right knee following treatment.    Instructed pt. regarding: Proper technique with all exercises. Pt demonstrated good understanding of the education provided. Jojo demonstrated good return demonstration of activities.    Assessment     Today's Assessment:  Patient was examined by Luis Burnett PA-C and they discussed decreased weight-bearing at this time as well as taking it a little easy for a few days to see if it will calm down.  Will progress exercises as  tolerated.  Patient felt a little better after manual and ice.     This is a 31 y.o. female referred to outpatient physical therapy and presents with a medical diagnosis of right lateral meniscus tear.  Patient presents with signs and symptoms consistent with MRI confirmed findings.  Patient's chief complaint is pain and inability to squat down.  Patient will benefit from skilled physical therapy services, specifically increased range of motion, increased flexibility, progressing to strengthening, functional mobility, and higher-level activities as desired. The following goals were discussed with the patient and patient is in agreement with them as to be addressed in the treatment plan. Patient was given a HEP consisting of exercises listed above. Patient verbally understood the instructions as they were given and demonstrated proper form and technique during therapy. Pt was advised to perform these exercises free of pain, and to stop performing them if pain occurs.     Medical necessity is demonstrated by the following IMPAIRMENTS/PROBLEM LIST:   1)Increase in pain level limiting function   2)Decreased range of motion   3)Decreased strength   4)Decreased functional mobility   5)Lack of HEP    GOALS: Short Term Goals: 8-10 weeks  1.  Report decreased right knee pain  < / =  3/10  to increase tolerance for ADLs, ambulation and return to fitness activities as desired.  2.  Increase knee ROM to equal opposite knee in order to be able to perform ADLs without difficulty.  3.  Increase strength to 4+/5 grossly to increase tolerance for ADL and work activities.  4.  Pt to tolerate HEP to improve ROM and independence with ADL's.  5.  Patient will report ability to participate in desired fitness and work activities with no pain or difficulty.    Plan     Pt will be treated by physical therapy 1-2 times a week for Pt Education, HEP, therapeutic exercises, neuromuscular re-education, manual therapy, joint mobilizations,  modalities prn to achieve established goals. Jojo may at times be seen by a PTA as part of the Rehab Team.     Cont PT for 8-10 weeks.     Dea Cohn, PT, DPT

## 2017-05-25 ENCOUNTER — CLINICAL SUPPORT (OUTPATIENT)
Dept: REHABILITATION | Facility: HOSPITAL | Age: 32
End: 2017-05-25
Attending: ORTHOPAEDIC SURGERY
Payer: COMMERCIAL

## 2017-05-25 DIAGNOSIS — M25.561 ACUTE PAIN OF RIGHT KNEE: ICD-10-CM

## 2017-05-25 PROCEDURE — 97110 THERAPEUTIC EXERCISES: CPT

## 2017-05-25 NOTE — PROGRESS NOTES
Physical Therapy Visit Note    Name: Jojo Boateng  Clinic Number: [id number]      Diagnosis:   Encounter Diagnosis   Name Primary?    Acute pain of right knee      Physician: Radha Barclay MD  Treatment Orders: PT Eval and Treat    Past Medical History:   Diagnosis Date    Hypertension      Current Outpatient Prescriptions   Medication Sig    celecoxib (CELEBREX) 200 MG capsule Take 1 capsule (200 mg total) by mouth 2 (two) times daily.    fluticasone (FLONASE) 50 mcg/actuation nasal spray     hydrochlorothiazide (HYDRODIURIL) 25 MG tablet     hydrocodone-acetaminophen 10-325mg (NORCO)  mg Tab Take 1 tablet by mouth every 4-6 hours for pain.    loratadine (CLARITIN) 10 mg tablet Take 10 mg by mouth once daily.    promethazine (PHENERGAN) 25 MG tablet Take 1 tablet (25 mg total) by mouth every 6 (six) hours as needed for Nausea.     No current facility-administered medications for this visit.      Review of patient's allergies indicates:   Allergen Reactions    Ultram [tramadol] Hives     Precautions: S/P right knee partial meniscectomy 4/25/2017    Today's Date:  5/25/2017  Evaluation Date: 4/25/2017  Visit # authorized: 6/20  Authorization period: 12/31/2017  Time In:  1200  Time Out:  1255    Subjective     Patient states her knee has been much better since last time she was here.  She rested it and has been more careful about not overdoing it at work so that seems to be helping.    Patient Goals: Return to full functional abilities and ability to work out    Objective     Observation: Patient is ambulating with mildly antalgic gait pattern and brought FWW but is not using it very much.  She is morbidly obese.    Functional tests: 4/26/17   Not performed 2/2 POD#1.    Range of Motion:   Knee Right active Left Active   Flexion 100 120   Extension -3 0     Lower Extremity Strength:4/26/17  Right LE  Left LE    Quadriceps: Trace Quadriceps: 4+/5   Hamstrings: 3/5 Hamstrings: 4+/5   Hip flexion  "(supine): 4/5 Hip flexion (supine): 4/5   Hip extension:  3+/5 Hip extension: 3+/5   Posterior Glute Med: 3/5 PGM:  3/5     4/26/17:  Special Tests:  None performed 2/2 POD#1.    Joint Mobility: Grossly hypomobile as expected; limited by pain.     Palpation: Point tenderness surrounding entire joint and patella; especially lateral joint line.    Sensation: Intact.    Flexibility:    Ely's test: R = Severely restricted ; L = Mildly restricted    Hamstrings: R = Severely restricted ; L = Moderately restricted     Edema: Moderate joint effusion as expected POD#1.    Functional Limitations Reports - G Codes  Category: Mobility  Tool: FOTO Knee 38%    TREATMENT:  Jojo received therapeutic exercises to develop strength and endurance, flexibility for 55 minutes including:   Recumbent Bike L3/10'  Gastroc Stretch on Wedge 2'x2B  Standing HS Stretch on Stairs 2'x2  Prone Quad Stretch 2'x2  Bridging 5sh x 20  6" Step Ups 20xB  Sidestepping BTB 20xB (started to bother medial knee at end)  Attempted SLS but medial knee pain so stopped    Not today:  Quad Sets 5sh x 20  Seated HS/Gastroc Stretch 3' as tolerated  Recumbent Bike L3/10'  Standing Gastroc/HS Stretch 1'x3 ea.  SLR 4-way 20x ea.  Supine HS Curls w/ SB for flx stretching 5'  SB Bridges 5sh x 15    Manual therapy for increased soft tissue and joint mobility and decreased pain for 0 minutes including:  Patellar mobs in all directions  Gentle patellar tendon mobs   STM surrounding joint and anterior knee as tolerated    Pt. Received cold pack x 10 min to right knee following treatment. (not today)    Instructed pt. regarding: Proper technique with all exercises. Pt demonstrated good understanding of the education provided. Jojo demonstrated good return demonstration of activities.    Assessment     Today's Assessment:  Patient tolerated treatment well and is progressing well.  Discussed that we will continue once per week until she is comfortable continuing on her " own.    This is a 31 y.o. female referred to outpatient physical therapy and presents with a medical diagnosis of right lateral meniscus tear.  Patient presents with signs and symptoms consistent with MRI confirmed findings.  Patient's chief complaint is pain and inability to squat down.  Patient will benefit from skilled physical therapy services, specifically increased range of motion, increased flexibility, progressing to strengthening, functional mobility, and higher-level activities as desired. The following goals were discussed with the patient and patient is in agreement with them as to be addressed in the treatment plan. Patient was given a HEP consisting of exercises listed above. Patient verbally understood the instructions as they were given and demonstrated proper form and technique during therapy. Pt was advised to perform these exercises free of pain, and to stop performing them if pain occurs.     Medical necessity is demonstrated by the following IMPAIRMENTS/PROBLEM LIST:   1)Increase in pain level limiting function   2)Decreased range of motion   3)Decreased strength   4)Decreased functional mobility   5)Lack of HEP    GOALS: Short Term Goals: 8-10 weeks  1.  Report decreased right knee pain  < / =  3/10  to increase tolerance for ADLs, ambulation and return to fitness activities as desired.  2.  Increase knee ROM to equal opposite knee in order to be able to perform ADLs without difficulty.  3.  Increase strength to 4+/5 grossly to increase tolerance for ADL and work activities.  4.  Pt to tolerate HEP to improve ROM and independence with ADL's.  5.  Patient will report ability to participate in desired fitness and work activities with no pain or difficulty.    Plan     Pt will be treated by physical therapy 1-2 times a week for Pt Education, HEP, therapeutic exercises, neuromuscular re-education, manual therapy, joint mobilizations, modalities prn to achieve established goals. Jojo head at times  be seen by a PTA as part of the Rehab Team.     Cont PT for 8-10 weeks.     Dea Cohn, PT, DPT

## 2017-11-12 PROBLEM — K80.20 CALCULUS OF GALLBLADDER WITHOUT CHOLECYSTITIS WITHOUT OBSTRUCTION: Status: ACTIVE | Noted: 2017-11-12

## 2017-11-12 PROBLEM — N39.0 URINARY TRACT INFECTION WITHOUT HEMATURIA: Status: ACTIVE | Noted: 2017-11-12

## 2018-08-09 ENCOUNTER — TELEPHONE (OUTPATIENT)
Dept: INTERNAL MEDICINE | Facility: CLINIC | Age: 33
End: 2018-08-09

## 2018-08-09 NOTE — TELEPHONE ENCOUNTER
EDUARDO [facility] Staff             Hi,     Can you please reach out the this patient and see if they will schedule a PCP appointment?  Also please let them know about [facility], because it sounds like they need it.     Priscilla

## 2019-06-27 ENCOUNTER — TELEPHONE (OUTPATIENT)
Dept: PSYCHIATRY | Facility: CLINIC | Age: 34
End: 2019-06-27

## 2019-06-27 NOTE — TELEPHONE ENCOUNTER
Returned patient's call that was left on our voicemail to schedule new patient appointment. LVM to ask that she please call us back to schedule.

## 2019-07-22 ENCOUNTER — OFFICE VISIT (OUTPATIENT)
Dept: PSYCHIATRY | Facility: CLINIC | Age: 34
End: 2019-07-22
Payer: COMMERCIAL

## 2019-07-22 VITALS
SYSTOLIC BLOOD PRESSURE: 122 MMHG | DIASTOLIC BLOOD PRESSURE: 72 MMHG | BODY MASS INDEX: 47.09 KG/M2 | HEIGHT: 66 IN | HEART RATE: 90 BPM | WEIGHT: 293 LBS

## 2019-07-22 DIAGNOSIS — F51.05 MOOD INSOMNIA: ICD-10-CM

## 2019-07-22 DIAGNOSIS — F39 MOOD INSOMNIA: ICD-10-CM

## 2019-07-22 DIAGNOSIS — F31.32 BIPOLAR I DISORDER, MODERATE, CURRENT OR MOST RECENT EPISODE DEPRESSED, WITH ANXIOUS DISTRESS: Primary | ICD-10-CM

## 2019-07-22 DIAGNOSIS — Z79.899 PHARMACOLOGIC THERAPY: ICD-10-CM

## 2019-07-22 PROCEDURE — 99204 PR OFFICE/OUTPT VISIT, NEW, LEVL IV, 45-59 MIN: ICD-10-PCS | Mod: S$GLB,,, | Performed by: NURSE PRACTITIONER

## 2019-07-22 PROCEDURE — 99999 PR PBB SHADOW E&M-EST. PATIENT-LVL III: CPT | Mod: PBBFAC,,, | Performed by: NURSE PRACTITIONER

## 2019-07-22 PROCEDURE — 99204 OFFICE O/P NEW MOD 45 MIN: CPT | Mod: S$GLB,,, | Performed by: NURSE PRACTITIONER

## 2019-07-22 PROCEDURE — 99999 PR PBB SHADOW E&M-EST. PATIENT-LVL III: ICD-10-PCS | Mod: PBBFAC,,, | Performed by: NURSE PRACTITIONER

## 2019-07-22 RX ORDER — LISINOPRIL AND HYDROCHLOROTHIAZIDE 20; 25 MG/1; MG/1
TABLET ORAL
Status: ON HOLD | COMMUNITY
Start: 2019-07-08 | End: 2022-01-30

## 2019-07-22 RX ORDER — OLANZAPINE 5 MG/1
5 TABLET ORAL NIGHTLY
Qty: 30 TABLET | Refills: 0 | Status: SHIPPED | OUTPATIENT
Start: 2019-07-22 | End: 2019-08-19 | Stop reason: SDUPTHER

## 2019-07-22 RX ORDER — MECLIZINE HYDROCHLORIDE 25 MG/1
TABLET ORAL
COMMUNITY
Start: 2019-07-08 | End: 2019-08-19

## 2019-07-22 RX ORDER — FLUOXETINE 10 MG/1
10 CAPSULE ORAL DAILY
Qty: 30 CAPSULE | Refills: 0 | Status: SHIPPED | OUTPATIENT
Start: 2019-07-22 | End: 2019-08-19 | Stop reason: SDUPTHER

## 2019-07-22 RX ORDER — HYDROXYZINE HYDROCHLORIDE 25 MG/1
25 TABLET, FILM COATED ORAL 2 TIMES DAILY PRN
Qty: 30 TABLET | Refills: 0 | Status: SHIPPED | OUTPATIENT
Start: 2019-07-22 | End: 2019-08-19 | Stop reason: SDUPTHER

## 2019-07-22 NOTE — PROGRESS NOTES
Outpatient Psychiatry Initial Visit (MD/NP)    7/22/2019    Jojo Boateng, a 33 y.o. female, presenting for initial evaluation visit. Met with patient.    Reason for Encounter: self-referral. Patient complains of   Chief Complaint   Patient presents with    New Patient     medication   .    History of Present Illness: Jojo Boateng is a 33 y.o. female that presents for an initial psychiatric evaluation. Jojo Boateng states that she came in today because she was diagnoed as bipolar disorder and anxiety. Most recently she was seeing someone at [facility] in the IOP program last year. She states she was put on Lautda but she canot afford the $80 a month copayment. However, she states that it did work well.  She was seeing Dr. Flores but that incurred a $150 out of pocket co-pay for her. Her primay, Dr. Aminah Mello was writnig scrips for Latuda. She states that she has been out of Latuda for about 2 weeks now. She was diagnosed with Bipolar Disorder at age 14 or 15 . Presently, she feels depressed and anxious. She gets 3-4 hours of sleep a night. She is isolating, feels irritable and is having anger issues.     In the past Jojo has taken: Depakote that worked well, she stopped it on her own for no reason, Prozac and this worked well, Zoloft which she cannot remember the effects, Buspar which made her agitated and she felt high for 30 minutes after taking it, klonopin which she did not like how it made her feel, and Latuda which worked well but was too expensive for her.    Her overall symptom complex includes: feels depressed, poor sleep, cries easily, feels a little nervous, isolates, agitated, anger, sometimes think that she would be fine with not being here, history of energy without sleep for two days, risky sexual behaviors and excessive spending       Review Of Systems:     GENERAL:  Morbidly obese  SKIN:  Tattoos to arms bilaterally   HEAD:  No headache  EYES:  No exophthalmos, jaundice or  "blindness  EARS:  No hearing loss  MOUTH & THROAT:  No dyskinetic movements or obvious goiter  CHEST:  No shortness of breath  CARDIOVASCULAR:  No chest pain  ABDOMEN:  No nausea, vomiting, pain, constipation or diarrhea  URINARY:  No dysuria or sexual dysfunction  MUSCULOSKELETAL:  No tremor, no tic  NEUROLOGIC:  No abnormal movements    Current Evaluation:     Nutritional Screening: Considering the patient's height and weight, medications, medical history and preferences, should a referral be made to the dietitian? no    Constitutional  Vitals:  Most recent vital signs, dated less than 90 days prior to this appointment, were reviewed.    Vitals:    07/22/19 0838   BP: 122/72   Pulse: 90   Weight: (!) 145.3 kg (320 lb 5.3 oz)   Height: 5' 6" (1.676 m)        General:  unremarkable, age appropriate     Musculoskeletal  Muscle Strength/Tone:  no tremor, no tic   Gait & Station:  non-ataxic     Psychiatric  Speech:  no latency; no press   Mood & Affect:  "a good mood."  Calm and pleasant   Thought Process:  normal and logical; appropriately abstract   Associations:  intact   Thought Content:  normal, no suicidality, no homicidality, delusions, or paranoia   Insight:  has awareness of illness   Judgement: Poor in regards to maintaining treatment for mental illness   Orientation:  grossly intact, person, place, situation   Memory: intact for content of interview; immediate memory is 3/3 objects, after 5 minutes is 3/3 objects   Language: grossly intact; able to repeat no ifs ands or buts   Attention Span & Concentration:  able to focus; able to spell WORLD forward and backward on 4th attempt   Fund of Knowledge:  intact and appropriate to age and level of education; adequate (President, Manfred, [employer] current event: [employer] investigating a man killed in [place]).       Relevant Elements of Neurological Exam: normal gait    Functioning in Relationships:  Spouse/partner: Good  Peers: Good  Employers: " Good    Laboratory Data  No visits with results within 1 Month(s) from this visit.   Latest known visit with results is:   Admission on 11/12/2017, Discharged on 11/12/2017   Component Date Value Ref Range Status    Preg Test, Ur 11/12/2017 Negative   Final    WBC 11/12/2017 7.12  3.90 - 12.70 K/uL Final    RBC 11/12/2017 5.04  4.00 - 5.40 M/uL Final    Hemoglobin 11/12/2017 13.4  12.0 - 16.0 g/dL Final    Hematocrit 11/12/2017 40.6  37.0 - 48.5 % Final    Mean Corpuscular Volume 11/12/2017 81* 82 - 98 fL Final    Mean Corpuscular Hemoglobin 11/12/2017 26.6* 27.0 - 31.0 pg Final    Mean Corpuscular Hemoglobin Conc 11/12/2017 33.0  32.0 - 36.0 g/dL Final    RDW 11/12/2017 14.1  11.5 - 14.5 % Final    Platelets 11/12/2017 251  150 - 350 K/uL Final    MPV 11/12/2017 9.7  9.2 - 12.9 fL Final    Gran # (ANC) 11/12/2017 5.5  1.8 - 7.7 K/uL Final    Lymph # 11/12/2017 1.2  1.0 - 4.8 K/uL Final    Mono # 11/12/2017 0.5  0.3 - 1.0 K/uL Final    Eos # 11/12/2017 0.0  0.0 - 0.5 K/uL Final    Baso # 11/12/2017 0.01  0.00 - 0.20 K/uL Final    Gran% 11/12/2017 76.6* 38.0 - 73.0 % Final    Lymph% 11/12/2017 16.4* 18.0 - 48.0 % Final    Mono% 11/12/2017 6.3  4.0 - 15.0 % Final    Eosinophil% 11/12/2017 0.6  0.0 - 8.0 % Final    Basophil% 11/12/2017 0.1  0.0 - 1.9 % Final    Differential Method 11/12/2017 Automated   Final    Sodium 11/12/2017 142  136 - 145 mmol/L Final    Potassium 11/12/2017 3.8  3.5 - 5.1 mmol/L Final    Chloride 11/12/2017 105  95 - 110 mmol/L Final    CO2 11/12/2017 25  23 - 29 mmol/L Final    Glucose 11/12/2017 104  70 - 110 mg/dL Final    BUN, Bld 11/12/2017 12  7 - 17 mg/dL Final    Creatinine 11/12/2017 0.74  0.50 - 1.40 mg/dL Final    Calcium 11/12/2017 8.6* 8.7 - 10.5 mg/dL Final    Total Protein 11/12/2017 7.7  6.0 - 8.4 g/dL Final    Albumin 11/12/2017 4.5  3.5 - 5.2 g/dL Final    Total Bilirubin 11/12/2017 0.9  0.1 - 1.0 mg/dL Final    Alkaline Phosphatase 11/12/2017  91  38 - 126 U/L Final    AST 11/12/2017 23  15 - 46 U/L Final    ALT 11/12/2017 36  10 - 44 U/L Final    Anion Gap 11/12/2017 12  8 - 16 mmol/L Final    eGFR if African American 11/12/2017 >60.0  >60 mL/min/1.73 m^2 Final    eGFR if non African American 11/12/2017 >60.0  >60 mL/min/1.73 m^2 Final    Lipase 11/12/2017 142  23 - 300 U/L Final    Specimen UA 11/12/2017 Urine, Clean Catch   Final    Color, UA 11/12/2017 Yellow  Yellow, Straw, Esme Final    Appearance, UA 11/12/2017 Clear  Clear Final    pH, UA 11/12/2017 6.0  5.0 - 8.0 Final    Specific Gravity, UA 11/12/2017 >=1.030* 1.005 - 1.030 Final    Protein, UA 11/12/2017 2+* Negative Final    Glucose, UA 11/12/2017 Negative  Negative Final    Ketones, UA 11/12/2017 Trace* Negative Final    Bilirubin (UA) 11/12/2017 1+* Negative Final    Occult Blood UA 11/12/2017 Negative  Negative Final    Nitrite, UA 11/12/2017 Negative  Negative Final    Urobilinogen, UA 11/12/2017 2.0-3.0* <2.0 EU/dL Final    Leukocytes, UA 11/12/2017 Negative  Negative Final    RBC, UA 11/12/2017 20* 0 - 4 /hpf Final    WBC, UA 11/12/2017 12* 0 - 5 /hpf Final    Bacteria 11/12/2017 Many* None-Occ /hpf Final    Squam Epithel, UA 11/12/2017 >100  /hpf Final    Hyaline Casts, UA 11/12/2017 0  0-1/lpf /lpf Final    Amorphous, UA 11/12/2017 Few  None-Moderate Final    Other (U/A) 11/12/2017 few mucus  None Final    Microscopic Comment 11/12/2017 SEE COMMENT   Final         Medications  Outpatient Encounter Medications as of 7/22/2019   Medication Sig Dispense Refill    lisinopril 10 MG tablet Take 10 mg by mouth once daily.       No facility-administered encounter medications on file as of 7/22/2019.            Assessment - Diagnosis - Goals:     Impression: Bipolar disorder, moderate, depressed, with anxious distress and mood insomnia      ICD-10-CM ICD-9-CM   1. Bipolar I disorder, moderate, current or most recent episode depressed, with anxious distress F31.32  296.52   2. Mood insomnia F51.05 [id number]    F39    3. Pharmacologic therapy Z79.899 V58.69       Strengths and Liabilities: Strength: Patient accepts guidance/feedback, Strength: Patient is expressive/articulate., Liability: Patient has poor judgment    Treatment Goals:  Specify outcomes written in observable, behavioral terms:   Safety: Will call 911 or Crisis Line or go to ER for suicidal ideation, adverse effects of medication or any other emergency  Anxiety: reducing negative automatic thoughts and reducing physical symptoms of anxiety  Depression: increasing energy, increasing interest in usual activities, increasing motivation and reducing negative automatic thoughts   Mood: Will state that her mood is stable.  Insomnia: Will get a minimum of 7 restful hours of sleep a night    Treatment Plan/Recommendations:   · Medication Management: The risks and benefits of medication were discussed with the patient.  · The treatment plan and follow up plan were reviewed with the patient.   1. Safety: Call 911 or Crisis Line or go to ER for suicidal ideation, adverse effects of medication or any other emergency  2. Labs: CMP, CBC, Lipid profile, TSH, Free T3, Free T4, Hemoglobin A1C, Prolactin, Urine Drug Screening.  3. Start Zyprexa 5 mg po qd.  4. Start Prozac 10 mg po qd.   5. Start hydroxyzine hcl 25 mg po qd as  Needed for sleep/anxiety.  6. RTC in one month or sooner prn.     Patient agrees with POC.    INSTRUCTIONS  Instructed to call 911 or Crisis Line or go to ER for suicidal ideation, adverse effects of medication or any other emergency. Verbalizes understanding and plan to comply.    Instructed on uses, effects, side effects, adverse reactions and benefits vs risks of Zyprexa with emphasis on risks for NMS, movement problems (EPS), increase in appetite, and risk for metabolic syndrome and instructed on when to seek 911/ER. Verbalizes understanding and plans to comply    Instructed on uses, effects, side effects,  adverse reactions and benefits vs risks of Prozac with emphasis on risks for suicidality, arabella, serotonin syndrome and delay in feeling effects of medication and instructed on when to seek 911/ER. Verbalizes understanding and plans to comply.        Return to Clinic: 1 month, as needed    Counseling time: 30 minutes  Total time: 55 minutes  Consulting clinician was informed of the encounter and consult note.

## 2019-07-22 NOTE — PATIENT INSTRUCTIONS
[facility] - DEPARTMENT OF PSYCHIATRY   NEW PATIENT ORIENTATION INFORMATION  OUTPATIENT SERVICES COUNSELING CONTRACT    We appreciate the opportunity to participate in your medical care and hope the following protocols will make it easier for you to receive quality treatment in our department.    1. PUNCTUALITY: Your appointment is scheduled for a fixed amount of time reserved especially for you.  To get the benefit of your appointment, please arrive early enough to allow time for parking and registration.  If you are late for your appointment, your clinician is not able to offer additional time.  Please make every effort to be on time.      2. PAYMENT FOR SERVICES:   Payments are expected at the time of service.  Please contact [phone] if you need to resolve issues involving your account at [facility] or to set up a payment plan.    3. CANCELLATION / MISSED APPOINTMENTS:   In order to receive quality care, all appointments must be kept.  Appointment may be cancelled, ONLY by talking with an  at phone number [phone], between 8:00 a.m. and 5:00 p.m., Monday through Friday, at least 24 hours before your appointment time.  Your clinician reserves this time specifically for you, and if you will be unable to use it, it is necessary that you cancel in a timely manner.  If you do not give at least 24-hour notice of cancellation a full fee will be assessed.  Please note that insurance does not cover no-show charges, so you will be billed directly.  If you are consistently late, cancel, or do not show for your appointments, our department reserves the right to terminate treatment    4. CALLING THE DEPARTMENT:   MESSAGES, SCHEDULE OR CANCEL APPOINTMENTS- In general you can reach the department by calling [phone], between 8:00 a.m. and 5:00 p.m., Monday through Friday, to schedule or cancel appointments or leave a message for your clinician.  It is advisable to schedule your visits  far in advance to obtain the most convenient times for your appointments.   AFTER HOURS, WEEKEND OR HOLIDAYS- For urgent questions after hours, weekends and holidays, calling the department number [phone] will connect you to the nursing staff on the Psychiatry Inpatient Unit.  The nursing staff will speak with you and contact the On Call psychiatrist if necessary.   EMERGENCY-  In case of a crisis when there is a concern of harm to self or others, call 911 or the office [phone] between 8:00 a.m. and 5:00 p.m., Monday through Friday.  After hours, weekends or holidays, please call 911 or go to the Emergency Department where you can be thoroughly evaluated by the On Call psychiatrist.  If possible, contact the psychiatrist on call at [phone] prior to leaving for the [facility] Emergency Department.    5. TEAM APPROACH:  Most patients receive therapy through our team system.  In the team system, your primary therapist will be a , psychologist, psychiatry resident or psychiatrist.  If your therapist is a , psychologist or psychiatry resident, please contact your primary therapist first in matters other than medications or acute medical problems.    6. PRESCRIPTION REFILLS:  Prescription refills must be done at your physician office visit.  You will be given a sufficient number of refills to last one extra month beyond your next appointment.  No additional refills will be approved beyond the original treatment plan.  After hours, sufficient medication may be approved to last until the next scheduled appointment. After hours requests for refills on controlled substances may be declined by the psychiatrist on call, as he or she may not be familiar with your case  Please work closely with your doctor so that you have sufficient medication until your next appointment.  Again, please note that no additional prescriptions will be approved per patient request over the phone.   No  "additional refills will be approved beyond the original treatment plan.   In certain exceptional situations, a phone consult appointment may be arranged, with appropriate charge, for the review and approval of prescription refills to last until the next scheduled appointment.    7. FOLLOW UP APPOINTMENTS:  Follow-up appointments can be made in person at the Psychiatry Appointment Desk, [place] [address], or by calling [phone], from 8am to 5pm, between Monday and Friday.  It is advisable to schedule your office visits far enough in advance to obtain the most convenient times for your appointments.    Revised Feb. 23, 2010 - F/OA1/Newpatient    You have been provided with a certain amount of medication with a specified number of refills.  Please follow up within an adequate time before you run out of medications.    REFILLS FOR CONTROLLED SUBSTANCES WILL NOT BE GIVEN WITHOUT AN APPOINTMENT.  I will not honor or fill automated refill requests from pharmacies.  You must come in for an appointment to get refills.    Please book your next appointment for myself or therapist by phone by calling our office at [phone].      PLEASE BE AT LEAST 15 MINUTES EARLY FOR YOUR NEXT APPOINTMENT.  PLEASE, DO NOT BE LATE OR YOU WILL BE TURNED AWAY AND ASKED TO RESCHEDULE.  YOU MUST COME EARLY TO ALLOW TIME FOR CHECK-IN AS WELL AS GET YOUR VITAL SIGNS AND GO OVER YOUR MEDICATIONS.  [employer] is not fair to the patients who present after you and are on time for their appointments.  It causes a delay in the appointments for patients and staff.  IF YOU ARE LATE, THERE IS A POSSIBILITY THAT YOU WILL BE CHARGED FOR THE APPOINTMENT TIME PERSONALLY AND IT WILL NOT GO TO YOUR INSURANCE.  YOU MAY ALSO BE DISCHARGED FROM CLINIC with multiple "No Show" appointments.  -----------------------------------------------------------------------------------------------------------------  IF YOU FEEL SUICIDAL OR HAVING THOUGHTS OR PLANS TO HURT " YOURSELF OR OTHERS, CALL 911 OR REPORT TO THE NEAREST EMERGENCY ROOM.  YOU CAN ALSO ACCESS THE FOLLOWING HOTLINE(S):    National Suicide Hotline Number 0-635-535-TALK (9469)     ([facility] Mobile Crisis, 314.356.7359'   [facility] Line, [phone]) 627-3139; [facility], 24 hours / 7 days, (277) 972-RQLM (6040), 2-100-567-YZFD (6511))     TIPS FOR GETTING YOUR PRESCRIPTIONS:    You can always ask your pharmacist the cost of your medications without the use of your insurance. Sometimes the medication will be cheaper if you do not use your insurance.     If you decide you want to have your prescriptions filled at a different pharmacy, you can always go to the new pharmacy of your choice and have them call the pharmacy where your prescription was sent and they can have your prescription transferred to the new pharmacy.

## 2019-07-26 ENCOUNTER — LAB VISIT (OUTPATIENT)
Dept: LAB | Facility: HOSPITAL | Age: 34
End: 2019-07-26
Attending: NURSE PRACTITIONER
Payer: COMMERCIAL

## 2019-07-26 DIAGNOSIS — Z79.899 PHARMACOLOGIC THERAPY: ICD-10-CM

## 2019-07-26 DIAGNOSIS — F39 MOOD INSOMNIA: ICD-10-CM

## 2019-07-26 DIAGNOSIS — F31.32 BIPOLAR I DISORDER, MODERATE, CURRENT OR MOST RECENT EPISODE DEPRESSED, WITH ANXIOUS DISTRESS: ICD-10-CM

## 2019-07-26 DIAGNOSIS — F51.05 MOOD INSOMNIA: ICD-10-CM

## 2019-07-26 LAB
ALBUMIN SERPL BCP-MCNC: 4.2 G/DL (ref 3.5–5.2)
ALP SERPL-CCNC: 76 U/L (ref 55–135)
ALT SERPL W/O P-5'-P-CCNC: 24 U/L (ref 10–44)
AMPHET+METHAMPHET UR QL: NEGATIVE
ANION GAP SERPL CALC-SCNC: 8 MMOL/L (ref 8–16)
AST SERPL-CCNC: 17 U/L (ref 10–40)
BARBITURATES UR QL SCN>200 NG/ML: NEGATIVE
BASOPHILS # BLD AUTO: 0.02 K/UL (ref 0–0.2)
BASOPHILS NFR BLD: 0.3 % (ref 0–1.9)
BENZODIAZ UR QL SCN>200 NG/ML: NEGATIVE
BILIRUB SERPL-MCNC: 0.5 MG/DL (ref 0.1–1)
BUN SERPL-MCNC: 21 MG/DL (ref 6–20)
BZE UR QL SCN: NEGATIVE
CALCIUM SERPL-MCNC: 9.3 MG/DL (ref 8.7–10.5)
CANNABINOIDS UR QL SCN: NEGATIVE
CHLORIDE SERPL-SCNC: 103 MMOL/L (ref 95–110)
CHOLEST SERPL-MCNC: 173 MG/DL (ref 120–199)
CHOLEST/HDLC SERPL: 4.6 {RATIO} (ref 2–5)
CO2 SERPL-SCNC: 26 MMOL/L (ref 23–29)
CREAT SERPL-MCNC: 0.9 MG/DL (ref 0.5–1.4)
CREAT UR-MCNC: 123 MG/DL (ref 15–325)
DIFFERENTIAL METHOD: NORMAL
EOSINOPHIL # BLD AUTO: 0.1 K/UL (ref 0–0.5)
EOSINOPHIL NFR BLD: 1.5 % (ref 0–8)
ERYTHROCYTE [DISTWIDTH] IN BLOOD BY AUTOMATED COUNT: 14.1 % (ref 11.5–14.5)
EST. GFR  (AFRICAN AMERICAN): >60 ML/MIN/1.73 M^2
EST. GFR  (NON AFRICAN AMERICAN): >60 ML/MIN/1.73 M^2
ESTIMATED AVG GLUCOSE: 103 MG/DL (ref 68–131)
ETHANOL UR-MCNC: <10 MG/DL
GLUCOSE SERPL-MCNC: 110 MG/DL (ref 70–110)
HBA1C MFR BLD HPLC: 5.2 % (ref 4–5.6)
HCT VFR BLD AUTO: 39.1 % (ref 37–48.5)
HDLC SERPL-MCNC: 38 MG/DL (ref 40–75)
HDLC SERPL: 22 % (ref 20–50)
HGB BLD-MCNC: 12.6 G/DL (ref 12–16)
LDLC SERPL CALC-MCNC: 114.6 MG/DL (ref 63–159)
LYMPHOCYTES # BLD AUTO: 1.9 K/UL (ref 1–4.8)
LYMPHOCYTES NFR BLD: 29.1 % (ref 18–48)
MCH RBC QN AUTO: 27 PG (ref 27–31)
MCHC RBC AUTO-ENTMCNC: 32.2 G/DL (ref 32–36)
MCV RBC AUTO: 84 FL (ref 82–98)
METHADONE UR QL SCN>300 NG/ML: NEGATIVE
MONOCYTES # BLD AUTO: 0.5 K/UL (ref 0.3–1)
MONOCYTES NFR BLD: 8.3 % (ref 4–15)
NEUTROPHILS # BLD AUTO: 3.9 K/UL (ref 1.8–7.7)
NEUTROPHILS NFR BLD: 61 % (ref 38–73)
NONHDLC SERPL-MCNC: 135 MG/DL
OPIATES UR QL SCN: NEGATIVE
PCP UR QL SCN>25 NG/ML: NEGATIVE
PLATELET # BLD AUTO: 279 K/UL (ref 150–350)
PMV BLD AUTO: 9.9 FL (ref 9.2–12.9)
POTASSIUM SERPL-SCNC: 4.4 MMOL/L (ref 3.5–5.1)
PROLACTIN SERPL IA-MCNC: 17 NG/ML (ref 5.2–26.5)
PROT SERPL-MCNC: 7.3 G/DL (ref 6–8.4)
RBC # BLD AUTO: 4.66 M/UL (ref 4–5.4)
SODIUM SERPL-SCNC: 137 MMOL/L (ref 136–145)
T3FREE SERPL-MCNC: 2.8 PG/ML (ref 2.3–4.2)
T4 FREE SERPL-MCNC: 0.97 NG/DL (ref 0.71–1.51)
TOXICOLOGY INFORMATION: NORMAL
TRIGL SERPL-MCNC: 102 MG/DL (ref 30–150)
TSH SERPL DL<=0.005 MIU/L-ACNC: 1.5 UIU/ML (ref 0.4–4)
WBC # BLD AUTO: 6.47 K/UL (ref 3.9–12.7)

## 2019-07-26 PROCEDURE — 80061 LIPID PANEL: CPT

## 2019-07-26 PROCEDURE — 84146 ASSAY OF PROLACTIN: CPT

## 2019-07-26 PROCEDURE — 80307 DRUG TEST PRSMV CHEM ANLYZR: CPT

## 2019-07-26 PROCEDURE — 80053 COMPREHEN METABOLIC PANEL: CPT

## 2019-07-26 PROCEDURE — 36415 COLL VENOUS BLD VENIPUNCTURE: CPT

## 2019-07-26 PROCEDURE — 85025 COMPLETE CBC W/AUTO DIFF WBC: CPT

## 2019-07-26 PROCEDURE — 84481 FREE ASSAY (FT-3): CPT

## 2019-07-26 PROCEDURE — 84439 ASSAY OF FREE THYROXINE: CPT

## 2019-07-26 PROCEDURE — 84443 ASSAY THYROID STIM HORMONE: CPT

## 2019-07-26 PROCEDURE — 83036 HEMOGLOBIN GLYCOSYLATED A1C: CPT

## 2019-08-05 ENCOUNTER — TELEPHONE (OUTPATIENT)
Dept: PSYCHIATRY | Facility: HOSPITAL | Age: 34
End: 2019-08-05

## 2019-08-05 ENCOUNTER — PATIENT MESSAGE (OUTPATIENT)
Dept: PSYCHIATRY | Facility: CLINIC | Age: 34
End: 2019-08-05

## 2019-08-05 NOTE — TELEPHONE ENCOUNTER
Call made to patient after receiving a message from [facility] in which she was concerned that she is not feeling any difference on the newly prescribed Prozac.     Instructed patient that It takes a couple of weeks for this medication (Prozac) to start working and we are at the two week marine now. Informed her that when she comes in on the 19th, we will have a pretty good idea of what Prozac is doing for her and there is the possibility that it may have to be increased. She told me that she feels the same, and not worse. She told me that she is not having any suicidal thoughts. She was instructed that If she starts to feel worse or if she has any thoughts of harming herself she should call 911 or go to the emergency room. An e-mail was sent to the patient via [facility], summarizing this encounter.

## 2019-08-19 ENCOUNTER — OFFICE VISIT (OUTPATIENT)
Dept: PSYCHIATRY | Facility: CLINIC | Age: 34
End: 2019-08-19
Payer: COMMERCIAL

## 2019-08-19 VITALS
HEIGHT: 66 IN | WEIGHT: 293 LBS | DIASTOLIC BLOOD PRESSURE: 64 MMHG | HEART RATE: 84 BPM | SYSTOLIC BLOOD PRESSURE: 116 MMHG | BODY MASS INDEX: 47.09 KG/M2

## 2019-08-19 DIAGNOSIS — F51.05 MOOD INSOMNIA: ICD-10-CM

## 2019-08-19 DIAGNOSIS — F31.32 BIPOLAR I DISORDER, MODERATE, CURRENT OR MOST RECENT EPISODE DEPRESSED, WITH ANXIOUS DISTRESS: Primary | ICD-10-CM

## 2019-08-19 DIAGNOSIS — F39 MOOD INSOMNIA: ICD-10-CM

## 2019-08-19 PROCEDURE — 99214 PR OFFICE/OUTPT VISIT, EST, LEVL IV, 30-39 MIN: ICD-10-PCS | Mod: S$GLB,,, | Performed by: NURSE PRACTITIONER

## 2019-08-19 PROCEDURE — 99999 PR PBB SHADOW E&M-EST. PATIENT-LVL III: CPT | Mod: PBBFAC,,, | Performed by: NURSE PRACTITIONER

## 2019-08-19 PROCEDURE — 99999 PR PBB SHADOW E&M-EST. PATIENT-LVL III: ICD-10-PCS | Mod: PBBFAC,,, | Performed by: NURSE PRACTITIONER

## 2019-08-19 PROCEDURE — 99214 OFFICE O/P EST MOD 30 MIN: CPT | Mod: S$GLB,,, | Performed by: NURSE PRACTITIONER

## 2019-08-19 RX ORDER — HYDROXYZINE HYDROCHLORIDE 25 MG/1
TABLET, FILM COATED ORAL
Qty: 90 TABLET | Refills: 0 | Status: SHIPPED | OUTPATIENT
Start: 2019-08-19 | End: 2019-09-17 | Stop reason: SDUPTHER

## 2019-08-19 RX ORDER — FLUOXETINE 10 MG/1
10 CAPSULE ORAL DAILY
Qty: 30 CAPSULE | Refills: 0 | Status: SHIPPED | OUTPATIENT
Start: 2019-08-19 | End: 2019-08-19 | Stop reason: SDUPTHER

## 2019-08-19 RX ORDER — OLANZAPINE 5 MG/1
5 TABLET ORAL NIGHTLY
Qty: 30 TABLET | Refills: 0 | Status: SHIPPED | OUTPATIENT
Start: 2019-08-19 | End: 2019-09-17 | Stop reason: SDUPTHER

## 2019-08-19 RX ORDER — OLANZAPINE 5 MG/1
5 TABLET ORAL NIGHTLY
Qty: 30 TABLET | Refills: 0 | Status: SHIPPED | OUTPATIENT
Start: 2019-08-19 | End: 2019-08-19 | Stop reason: SDUPTHER

## 2019-08-19 RX ORDER — FLUOXETINE 10 MG/1
10 CAPSULE ORAL DAILY
Qty: 30 CAPSULE | Refills: 0 | Status: SHIPPED | OUTPATIENT
Start: 2019-08-19 | End: 2019-09-17

## 2019-08-19 NOTE — PATIENT INSTRUCTIONS
"You have been provided with a certain amount of medication with a specified number of refills.  Please follow up within an adequate time before you run out of medications.    REFILLS FOR CONTROLLED SUBSTANCES WILL NOT BE GIVEN WITHOUT AN APPOINTMENT.  I will not honor or fill automated refill requests from pharmacies.  You must come in for an appointment to get refills.    Please book your next appointment for myself or therapist by phone by calling our office at [phone].      PLEASE BE AT LEAST 15 MINUTES EARLY FOR YOUR NEXT APPOINTMENT.  PLEASE, DO NOT BE LATE OR YOU WILL BE TURNED AWAY AND ASKED TO RESCHEDULE.  YOU MUST COME EARLY TO ALLOW TIME FOR CHECK-IN AS WELL AS GET YOUR VITAL SIGNS AND GO OVER YOUR MEDICATIONS.  Tardiness is not fair to the patients who present after you and are on time for their appointments.  It causes a delay in the appointments for patients and staff.  IF YOU ARE LATE, THERE IS A POSSIBILITY THAT YOU WILL BE CHARGED FOR THE APPOINTMENT TIME PERSONALLY AND IT WILL NOT GO TO YOUR INSURANCE.  YOU MAY ALSO BE DISCHARGED FROM CLINIC with multiple "No Show" appointments.  -----------------------------------------------------------------------------------------------------------------  IF YOU FEEL SUICIDAL OR HAVING THOUGHTS OR PLANS TO HURT YOURSELF OR OTHERS, CALL 911 OR REPORT TO THE NEAREST EMERGENCY ROOM.  YOU CAN ALSO ACCESS THE FOLLOWING HOTLINE(S):    National Suicide Hotline Number 1-659-796-TALK (4991)     ([facility] Mobile Crisis, [phone]'   [place] [facility], [phone]; [place], 24 hours / 7 days, [phone]-COPE ([phone]), 3-436-713-COPE ([phone]))     TIPS FOR GETTING YOUR PRESCRIPTIONS:    You can always ask your pharmacist the cost of your medications without the use of your insurance. Sometimes the medication will be cheaper if you do not use your insurance.     If you decide you want to have your prescriptions filled at " a different pharmacy, you can always go to the new pharmacy of your choice and have them call the pharmacy where your prescription was sent and they can have your prescription transferred to the new pharmacy.

## 2019-08-19 NOTE — PROGRESS NOTES
"Outpatient Psychiatry Follow-Up Visit (MD/NP)    8/19/2019    Clinical Status of Patient:  Outpatient (Ambulatory)    Chief Complaint:  Jojo Boateng is a 33 y.o. female who presents today for follow-up of mood disorder, anxiety and poor sleep.  Met with patient.      Interval History and Content of Current Session:  Interim Events/Subjective Report/Content of Current Session: Jojo  was last seen by me on 07/22/2019 for an initial psychiatric evaluation. Jojo was diagnosed with Bipolar disorder, moderate, depressed, with anxious distress and mood insomnia and a plan of care was devised to include:    1. Safety: Call 911 or Crisis Line or go to ER for suicidal ideation, adverse effects of medication or any other emergency  2. Labs: CMP, CBC, Lipid profile, TSH, Free T3, Free T4, Hemoglobin A1C, Prolactin, Urine Drug Screening.  3. Start Zyprexa 5 mg po qd.  4. Start Prozac 10 mg po qd.   5. Start hydroxyzine hcl 25 mg po qd as  Needed for sleep/anxiety.  6. RTC in one month or sooner prn.       Today Jojo is seen face to face. She cut out sodas, sweet tea and sweets and fried fodos and has lost 4 pounds. She is drinking mostly water and one cup of sugat free tea a day. Her appetite is good but she is making positive lifestyle changes. She is exercising everyday either in the gym or by playing sports. Her mood is a little better. The atarax did not work for the anxiety when she was about tho have an anxiety attack. The baby was starting  and this is when she had an anxiety attack. She agrees to try two Atarax tablets at at time to see if this is effective for management of acute anxiety. Her partner thinks that she is a little happier. Her partner stated to her that she is not "all up in your feelings". Her anxiety has lessened some but psychosocial issues are casing anxiety. Her sleep is good. Her energy level is good. Her speech is a little rapid today.    Her labs are reviewed with her " today.  Lab Visit on 07/26/2019   Component Date Value Ref Range Status    Sodium 07/26/2019 137  136 - 145 mmol/L Final    Potassium 07/26/2019 4.4  3.5 - 5.1 mmol/L Final    Chloride 07/26/2019 103  95 - 110 mmol/L Final    CO2 07/26/2019 26  23 - 29 mmol/L Final    Glucose 07/26/2019 110  70 - 110 mg/dL Final    BUN, Bld 07/26/2019 21* 6 - 20 mg/dL Final    Creatinine 07/26/2019 0.9  0.5 - 1.4 mg/dL Final    Calcium 07/26/2019 9.3  8.7 - 10.5 mg/dL Final    Total Protein 07/26/2019 7.3  6.0 - 8.4 g/dL Final    Albumin 07/26/2019 4.2  3.5 - 5.2 g/dL Final    Total Bilirubin 07/26/2019 0.5  0.1 - 1.0 mg/dL Final    Comment: For infants and newborns, interpretation of results should be based  on gestational age, weight and in agreement with clinical  observations.  Premature Infant recommended reference ranges:  Up to 24 hours.............<8.0 mg/dL  Up to 48 hours............<12.0 mg/dL  3-5 days..................<15.0 mg/dL  6-29 days.................<15.0 mg/dL      Alkaline Phosphatase 07/26/2019 76  55 - 135 U/L Final    AST 07/26/2019 17  10 - 40 U/L Final    ALT 07/26/2019 24  10 - 44 U/L Final    Anion Gap 07/26/2019 8  8 - 16 mmol/L Final    eGFR if African American 07/26/2019 >60  >60 mL/min/1.73 m^2 Final    eGFR if non African American 07/26/2019 >60  >60 mL/min/1.73 m^2 Final    Comment: Calculation used to obtain the estimated glomerular filtration  rate (eGFR) is the CKD-EPI equation.       WBC 07/26/2019 6.47  3.90 - 12.70 K/uL Final    RBC 07/26/2019 4.66  4.00 - 5.40 M/uL Final    Hemoglobin 07/26/2019 12.6  12.0 - 16.0 g/dL Final    Hematocrit 07/26/2019 39.1  37.0 - 48.5 % Final    Mean Corpuscular Volume 07/26/2019 84  82 - 98 fL Final    Mean Corpuscular Hemoglobin 07/26/2019 27.0  27.0 - 31.0 pg Final    Mean Corpuscular Hemoglobin Conc 07/26/2019 32.2  32.0 - 36.0 g/dL Final    RDW 07/26/2019 14.1  11.5 - 14.5 % Final    Platelets 07/26/2019 279  150 - 350 K/uL Final     MPV 07/26/2019 9.9  9.2 - 12.9 fL Final    Gran # (ANC) 07/26/2019 3.9  1.8 - 7.7 K/uL Final    Lymph # 07/26/2019 1.9  1.0 - 4.8 K/uL Final    Mono # 07/26/2019 0.5  0.3 - 1.0 K/uL Final    Eos # 07/26/2019 0.1  0.0 - 0.5 K/uL Final    Baso # 07/26/2019 0.02  0.00 - 0.20 K/uL Final    Gran% 07/26/2019 61.0  38.0 - 73.0 % Final    Lymph% 07/26/2019 29.1  18.0 - 48.0 % Final    Mono% 07/26/2019 8.3  4.0 - 15.0 % Final    Eosinophil% 07/26/2019 1.5  0.0 - 8.0 % Final    Basophil% 07/26/2019 0.3  0.0 - 1.9 % Final    Differential Method 07/26/2019 Automated   Final    TSH 07/26/2019 1.501  0.400 - 4.000 uIU/mL Final    T3, Free 07/26/2019 2.8  2.3 - 4.2 pg/mL Final    Free T4 07/26/2019 0.97  0.71 - 1.51 ng/dL Final    Cholesterol 07/26/2019 173  120 - 199 mg/dL Final    Comment: The National Cholesterol Education Program (NCEP) has set the  following guidelines (reference ranges) for Cholesterol:  Optimal.....................<200 mg/dL  Borderline High.............200-239 mg/dL  High........................> or = 240 mg/dL      Triglycerides 07/26/2019 102  30 - 150 mg/dL Final    Comment: The National Cholesterol Education Program (NCEP) has set the  following guidelines (reference values) for triglycerides:  Normal......................<150 mg/dL  Borderline High.............150-199 mg/dL  High........................200-499 mg/dL      HDL 07/26/2019 38* 40 - 75 mg/dL Final    Comment: The National Cholesterol Education Program (NCEP) has set the  following guidelines (reference values) for HDL Cholesterol:  Low...............<40 mg/dL  Optimal...........>60 mg/dL      LDL Cholesterol 07/26/2019 114.6  63.0 - 159.0 mg/dL Final    Comment: The National Cholesterol Education Program (NCEP) has set the  following guidelines (reference values) for LDL Cholesterol:  Optimal.......................<130 mg/dL  Borderline High...............130-159 mg/dL  High..........................160-189  mg/dL  Very High.....................>190 mg/dL      Hdl/Cholesterol Ratio 07/26/2019 22.0  20.0 - 50.0 % Final    Total Cholesterol/HDL Ratio 07/26/2019 4.6  2.0 - 5.0 Final    Non-HDL Cholesterol 07/26/2019 135  mg/dL Final    Comment: Risk category and Non-HDL cholesterol goals:  Coronary heart disease (CHD)or equivalent (10-year risk of CHD >20%):  Non-HDL cholesterol goal     <130 mg/dL  Two or more CHD risk factors and 10-year risk of CHD <= 20%:  Non-HDL cholesterol goal     <160 mg/dL  0 to 1 CHD risk factor:  Non-HDL cholesterol goal     <190 mg/dL      Hemoglobin A1C 07/26/2019 5.2  4.0 - 5.6 % Final    Comment: ADA Screening Guidelines:  5.7-6.4%  Consistent with prediabetes  >or=6.5%  Consistent with diabetes  High levels of fetal hemoglobin interfere with the HbA1C  assay. Heterozygous hemoglobin variants (HbS, HgC, etc)do  not significantly interfere with this assay.   However, presence of multiple variants may affect accuracy.      Estimated Avg Glucose 07/26/2019 103  68 - 131 mg/dL Final    Prolactin 07/26/2019 17.0  5.2 - 26.5 ng/mL Final    Alcohol, Urine 07/26/2019 <10  <10 mg/dL Final    Benzodiazepines 07/26/2019 Negative   Final    Methadone metabolites 07/26/2019 Negative   Final    Cocaine (Metab.) 07/26/2019 Negative   Final    Opiate Scrn, Ur 07/26/2019 Negative   Final    Barbiturate Screen, Ur 07/26/2019 Negative   Final    Amphetamine Screen, Ur 07/26/2019 Negative   Final    THC 07/26/2019 Negative   Final    Phencyclidine 07/26/2019 Negative   Final    Creatinine, Random Ur 07/26/2019 123.0  15.0 - 325.0 mg/dL Final    Comment: The random urine reference ranges provided were established   for 24 hour urine collections.  No reference ranges exist for  random urine specimens.  Correlate clinically.      Toxicology Information 07/26/2019 SEE COMMENT   Final    Comment: This screen includes the following classes of drugs at the   listed cut-off:  Benzodiazepines                   200 ng/ml  Methadone                        300 ng/ml  Cocaine metabolite               300 ng/ml  Opiates                          300 ng/ml  Barbiturates                     200 ng/ml  Amphetamines                    1000 ng/ml  Marijuana metabs (THC)            50 ng/ml  Phencyclidine (PCP)               25 ng/ml  High concentrations of Diphenhydramine may cross-react with  Phencyclidine PCP screening immunoassay giving a false   positive result.  High concentrations of Methylenedioxymethamphetamine (MDMA aka  Ectasy) and other structurally similar compounds may cross-   react with the Amphetamine/Methamphetamine screening   immunoassay giving a false positive result.  A metabolite of the anti-HIV drug Sustiva () may cause  false positive results in the Marijuana metabolite (THC)   screening assay.  Note: This exception list includes only more common   interferants i                           n toxicology screen testing.  Because of many   cross-reactantspositive results on toxicology drug screens   should be confirmed whenever results do not correlate with   clinical presentation.  This report is intended for use in clinical monitoring and  management of patients. It is not intended for use in   employment related drug testing.  Because of any cross-reactants, positive results on toxicology  drug screens should be confirmed whenever results do not  correlate with clinical presentation.  Presumptive positive results are unconfirmed and may be used   only for medical purposes.  Assay Intended Use: This asasy provides only a preliminary analytical  test result. A more specific alternate chemical method must be used  to obtain a confirmed analytical result. Gas chromatography/mass  spectrometry (GS/MS)is the preferred confirmatory method. Clinical  consideration and professional judgement should be applied to any   drug of abuse test result, particularly when preliminary resul                            ts  are used.     ]      Psychotherapy:  · Target symptoms: anxiety , mood disorder, poor sleep  · Why chosen therapy is appropriate versus another modality: relevant to diagnosis, evidence based practice  · Outcome monitoring methods: self-report, observation  · Therapeutic intervention type: supportive psychotherapy  · Topics discussed/themes: parenting issues, medications, symptoms and symptom improvement and management  · The patient's response to the intervention is accepting. The patient's progress toward treatment goals is good.   · Duration of intervention: 20 minutes.    Review of Systems   · PSYCHIATRIC: Pertinant items are noted in the narrative.  GENERAL:  Morbidly obese  SKIN:  Tattoos to arms bilaterally   HEAD:  No headache  EYES:  No exophthalmos, jaundice or blindness  EARS:  No hearing loss  MOUTH & THROAT:  No dyskinetic movements or obvious goiter  CHEST:  No shortness of breath  CARDIOVASCULAR:  No chest pain  ABDOMEN:  No nausea, vomiting, pain, constipation or diarrhea  URINARY:  No dysuria or sexual dysfunction  MUSCULOSKELETAL:  No tremor, no tic  NEUROLOGIC:  No abnormal movements    Past Medical, Family and Social History: The patient's past medical, family and social history have been reviewed and updated as appropriate within the electronic medical record - see encounter notes.    Compliance: yes    Side effects: None    Risk Parameters:  Patient reports no suicidal ideation  Patient reports no homicidal ideation  Patient reports no self-injurious behavior  Patient reports no violent behavior    Exam (detailed: at least 9 elements; comprehensive: all 15 elements)   Constitutional  Vitals:  Most recent vital signs, dated less than 90 days prior to this appointment, were reviewed.   There were no vitals filed for this visit.     General:  obese     Musculoskeletal  Muscle Strength/Tone:  no tremor, no tic   Gait & Station:  non-ataxic     Psychiatric  Speech:  no latency; no press   Mood &  "Affect:  "better."  congruent and appropriate   Thought Process:  normal and logical   Associations:  intact   Thought Content:  normal, no suicidality, no homicidality, delusions, or paranoia   Insight:  has awareness of illness   Judgement: behavior is adequate to circumstances   Orientation:  grossly intact   Memory: intact for content of interview   Language: grossly intact   Attention Span & Concentration:  able to focus   Fund of Knowledge:  intact and appropriate to age and level of education     Assessment and Diagnosis   Status/Progress: Based on the examination today, the patient's problem(s) is/are improved.  New problems have not been presented today.   Psychosocial issues are complicating management of the primary condition.  There are no active rule-out diagnoses for this patient at this time.     General Impression: She has improved but psychosocial issues are complicating management.       ICD-10-CM ICD-9-CM   1. Bipolar I disorder, moderate, current or most recent episode depressed, with anxious distress F31.32 296.52   2. Mood insomnia F51.05 [id number]    F39        Intervention/Counseling/Treatment Plan   · Medication Management: The risks and benefits of medication were discussed with the patient.  · The treatment plan and follow up plan were reviewed with the patient.   1. Safety: Call 911 or Crisis Line or go to ER for suicidal ideation, adverse effects of medication or any other emergency  2. Return to clinic in one month or sooner prn.   3. Zyprexa 5 mg po qd.  4. Prozac 10 mg po qd.   5. Change hydroxyzine hcl 25 mg po qd as  Needed for sleep/anxiety to 1-2 tabs bid prn.       Patient agrees with POC.     INSTRUCTIONS  Instructed to call 911 or Crisis Line or go to ER for suicidal ideation, adverse effects of medication or any other emergency. Verbalizes understanding and plan to comply.      Return to Clinic: 1 month, as needed    "

## 2019-09-08 ENCOUNTER — PATIENT MESSAGE (OUTPATIENT)
Dept: PSYCHIATRY | Facility: CLINIC | Age: 34
End: 2019-09-08

## 2019-09-16 NOTE — PROGRESS NOTES
"Outpatient Psychiatry Follow-Up Visit (MD/NP)    9/17/2019    Clinical Status of Patient:  Outpatient (Ambulatory)    Chief Complaint:  Jojo Boateng is a 33 y.o. female who presents today for follow-up of mood disorder, anxiety and poor sleep.  Met with patient.      Interval History and Content of Current Session:  Interim Events/Subjective Report/Content of Current Session: Jojo  was last seen by me on 08/19/2019 for a follow up visit. Jojo was diagnosed with Bipolar disorder, moderate, depressed, with anxious distress and mood insomnia. She had improved but was having a lot of psychosocial issues and a plan of care was devised to include:    1. Safety: Call 911 or Crisis Line or go to ER for suicidal ideation, adverse effects of medication or any other emergency  2. Return to clinic in one month or sooner prn.   3. Zyprexa 5 mg po qd.  4. Prozac 10 mg po qd.   5. Change hydroxyzine hcl 25 mg po qd as  Needed for sleep/anxiety to 1-2 tabs bid prn.    On 09/08/2019, Jojo Boateng contacted this provider via [facility] stating "this medication is not working. I am having all these thoughts like when Im manic. Its getting to the point where I want to commit myself for unusual behavior. Can you please let me know what I need to do?" Subsequently, Jojo checked herself into [facility]. She states she was there for 3 days and was discharged on Prozac 20 mgs (an incrase of 10 mgs) and Trazodone 100 mg qhs prn. She states she was told she had manic depressive disorder and the Zyprexa was stopped. She does not have her discharge paperwork with her today.    Today she enters the room with her head down and an extremely sad affect. As the interview progresses her affect is full and appropriate. She goes on to state that she has a lot of psychosocial issues affecting her. Her fiance left her. She states her fiance told her that she needs to get herself together. They are still sharing " custody of their child. She states that she is very frustrated at work. She states she has paperwork for this provider to fill out so that she can get FMLA. She is advised that staying out of work for IOP services would be helpful but otherwise she is able to work. She states that this is why she wants the time off. She states her stay at [facility] was unpleasant and she does not feel that she was listened to and does not feel that she received the best care. She further states that she wants the days off to attend [facility]. She is advised that she will have to get someone from the program at [facility] to fill out her paperwork to get time off to go to the program at [facility]. She verbalizes understanding.     Processed the possibility that the patient sought help at [facility] because of problems with her fiance. She immediately dismisses this and is asked to think about this possibility and what her purpose was in going to [facility], planning to go to [facility] and her desire to make positive changes. She states she will do this. Her mood is described as down today. She has multiple psychosocial issues at home and at work. Her anxiety is under control with the Atarax. Her sleep is good with the Trazodone. Her appetite is good. She has lost 12 pounds by changing her diet. Her energy level is fluctuating.     Psychotherapy:  · Target symptoms: anxiety , mood disorder, poor sleep  · Why chosen therapy is appropriate versus another modality: relevant to diagnosis, evidence based practice  · Outcome monitoring methods: self-report, observation  · Therapeutic intervention type: supportive psychotherapy  · Topics discussed/themes: relationships difficulties, parenting issues, benefits of IOP, purpose in voluntary admission recently  · The patient's response to the intervention is accepting. The patient's progress toward treatment goals is limited.   · Duration of intervention: 20 minutes.    Review of  "Systems   PSYCHIATRIC: Pertinant items are noted in the narrative.  GENERAL:  Morbidly obese  SKIN:  Tattoos to arms bilaterally   HEAD:  No headache  EYES:  No exophthalmos, jaundice or blindness  EARS:  No hearing loss  MOUTH & THROAT:  No dyskinetic movements or obvious goiter  CHEST:  No shortness of breath  CARDIOVASCULAR:  No chest pain  ABDOMEN:  No nausea, vomiting, pain, constipation or diarrhea  URINARY:  No dysuria or sexual dysfunction  MUSCULOSKELETAL:  No tremor, no tic  NEUROLOGIC:  No abnormal movements    Past Medical, Family and Social History: The patient's past medical, family and social history have been reviewed and updated as appropriate within the electronic medical record - see encounter notes.    Compliance: yes    Side effects: None    Risk Parameters:  Patient reports no suicidal ideation  Patient reports no homicidal ideation  Patient reports no self-injurious behavior  Patient reports no violent behavior    Exam (detailed: at least 9 elements; comprehensive: all 15 elements)   Constitutional  Vitals:  Most recent vital signs, dated less than 90 days prior to this appointment, were reviewed.   Vitals:    09/17/19 0817   BP: 132/82   Pulse: 96   Weight: (!) 142 kg (312 lb 15.1 oz)   Height: 5' 6" (1.676 m)        General:  obese     Musculoskeletal  Muscle Strength/Tone:  no tremor, no tic   Gait & Station:  non-ataxic     Psychiatric  Speech:  no latency; no press   Mood & Affect:  calm and pleasant throughout most of interview  congruent and appropriate throughout most of the interview   Thought Process:  normal and logical   Associations:  intact   Thought Content:  normal, no suicidality, no homicidality, delusions, or paranoia   Insight:  has awareness of illness   Judgement: poor in relation to coping   Orientation:  grossly intact   Memory: intact for content of interview   Language: grossly intact   Attention Span & Concentration:  able to focus   Fund of Knowledge:  intact and " appropriate to age and level of education     Assessment and Diagnosis   Status/Progress: Based on the examination today, the patient's problem(s) is/are inadequately controlled.  New problems have not been presented today.   Psychosocial issues are complicating management of the primary condition.  The working differential for this patient includes malingering.    General Impression: Her symptoms are inadequately controlled.       ICD-10-CM ICD-9-CM   1. Bipolar I disorder, moderate, current or most recent episode depressed, with anxious distress F31.32 296.52   2. Mood insomnia F51.05 [id number]    F39        Intervention/Counseling/Treatment Plan   · Medication Management: The risks and benefits of medication were discussed with the patient.  · The treatment plan and follow up plan were reviewed with the patient.   1. Safety: Call 911 or Crisis Line or go to ER for suicidal ideation, adverse effects of medication or any other emergency  2. Return to clinic in 3 weeks or sooner prn.   3. Restart Zyprexa 5 mg po qd.  4. Keep increased dose of Prozac 20 mg po qd.   5. hydroxyzine hcl 25 mg po qd PRN sleep/anxiety take 1-2 tabs bid prn.  6. D/C Vistaril which was started in [facility] (makes patient sleepy).     Patient agrees with POC.     INSTRUCTIONS  Instructed to call 911 or Crisis Line or go to ER for suicidal ideation, adverse effects of medication or any other emergency. Verbalizes understanding and plan to comply.      Return to Clinic: 3 weeks, as needed

## 2019-09-17 ENCOUNTER — OFFICE VISIT (OUTPATIENT)
Dept: PSYCHIATRY | Facility: CLINIC | Age: 34
End: 2019-09-17
Payer: COMMERCIAL

## 2019-09-17 VITALS
WEIGHT: 293 LBS | DIASTOLIC BLOOD PRESSURE: 82 MMHG | BODY MASS INDEX: 47.09 KG/M2 | SYSTOLIC BLOOD PRESSURE: 132 MMHG | HEART RATE: 96 BPM | HEIGHT: 66 IN

## 2019-09-17 DIAGNOSIS — F51.05 MOOD INSOMNIA: ICD-10-CM

## 2019-09-17 DIAGNOSIS — F31.32 BIPOLAR I DISORDER, MODERATE, CURRENT OR MOST RECENT EPISODE DEPRESSED, WITH ANXIOUS DISTRESS: Primary | ICD-10-CM

## 2019-09-17 DIAGNOSIS — F39 MOOD INSOMNIA: ICD-10-CM

## 2019-09-17 PROCEDURE — 99999 PR PBB SHADOW E&M-EST. PATIENT-LVL III: CPT | Mod: PBBFAC,,, | Performed by: NURSE PRACTITIONER

## 2019-09-17 PROCEDURE — 99214 PR OFFICE/OUTPT VISIT, EST, LEVL IV, 30-39 MIN: ICD-10-PCS | Mod: S$GLB,,, | Performed by: NURSE PRACTITIONER

## 2019-09-17 PROCEDURE — 99214 OFFICE O/P EST MOD 30 MIN: CPT | Mod: S$GLB,,, | Performed by: NURSE PRACTITIONER

## 2019-09-17 PROCEDURE — 99999 PR PBB SHADOW E&M-EST. PATIENT-LVL III: ICD-10-PCS | Mod: PBBFAC,,, | Performed by: NURSE PRACTITIONER

## 2019-09-17 RX ORDER — HYDROXYZINE PAMOATE 50 MG/1
50 CAPSULE ORAL DAILY
COMMUNITY
Start: 2018-05-28 | End: 2019-09-17

## 2019-09-17 RX ORDER — HYDROXYZINE HYDROCHLORIDE 25 MG/1
TABLET, FILM COATED ORAL
Qty: 90 TABLET | Refills: 0 | Status: SHIPPED | OUTPATIENT
Start: 2019-09-17 | End: 2019-11-25

## 2019-09-17 RX ORDER — TRAZODONE HYDROCHLORIDE 100 MG/1
100 TABLET ORAL NIGHTLY
COMMUNITY
End: 2019-11-25

## 2019-09-17 RX ORDER — OLANZAPINE 5 MG/1
5 TABLET ORAL NIGHTLY
Qty: 30 TABLET | Refills: 0 | Status: SHIPPED | OUTPATIENT
Start: 2019-09-17 | End: 2019-11-25 | Stop reason: SDUPTHER

## 2019-09-17 RX ORDER — FLUOXETINE HYDROCHLORIDE 20 MG/1
20 CAPSULE ORAL DAILY
Qty: 1 CAPSULE | Refills: 0
Start: 2019-09-17 | End: 2019-10-07 | Stop reason: ALTCHOICE

## 2019-09-17 NOTE — PATIENT INSTRUCTIONS
"You have been provided with a certain amount of medication with a specified number of refills.  Please follow up within an adequate time before you run out of medications.    REFILLS FOR CONTROLLED SUBSTANCES WILL NOT BE GIVEN WITHOUT AN APPOINTMENT.  I will not honor or fill automated refill requests from pharmacies.  You must come in for an appointment to get refills.    Please book your next appointment for myself or therapist by phone by calling our office at [phone].      PLEASE BE AT LEAST 15 MINUTES EARLY FOR YOUR NEXT APPOINTMENT.  PLEASE, DO NOT BE LATE OR YOU WILL BE TURNED AWAY AND ASKED TO RESCHEDULE.  YOU MUST COME EARLY TO ALLOW TIME FOR CHECK-IN AS WELL AS GET YOUR VITAL SIGNS AND GO OVER YOUR MEDICATIONS.  [facility] is not fair to the patients who present after you and are on time for their appointments.  It causes a delay in the appointments for patients and staff.  IF YOU ARE LATE, THERE IS A POSSIBILITY THAT YOU WILL BE CHARGED FOR THE APPOINTMENT TIME PERSONALLY AND IT WILL NOT GO TO YOUR INSURANCE.  YOU MAY ALSO BE DISCHARGED FROM CLINIC with multiple "No Show" appointments.  -----------------------------------------------------------------------------------------------------------------  IF YOU FEEL SUICIDAL OR HAVING THOUGHTS OR PLANS TO HURT YOURSELF OR OTHERS, CALL 911 OR REPORT TO THE NEAREST EMERGENCY ROOM.  YOU CAN ALSO ACCESS THE FOLLOWING HOTLINE(S):    National Suicide Hotline Number 3-200-327-TALK (3473)     ([facility] Mobile Crisis, [phone]'   [facility] Crisis Line, (913) 665-7704; [facility], 24 hours / 7 days, (363) 485-COPE (5063), 3-545-562-COPE (2552))     TIPS FOR GETTING YOUR PRESCRIPTIONS:    You can always ask your pharmacist the cost of your medications without the use of your insurance. Sometimes the medication will be cheaper if you do not use your insurance.     If you decide you want to have your prescriptions filled at " a different pharmacy, you can always go to the new pharmacy of your choice and have them call the pharmacy where your prescription was sent and they can have your prescription transferred to the new pharmacy.

## 2019-11-01 ENCOUNTER — TELEPHONE (OUTPATIENT)
Dept: PSYCHIATRY | Facility: CLINIC | Age: 34
End: 2019-11-01

## 2019-11-01 NOTE — TELEPHONE ENCOUNTER
Returned patient's call left on our voicemail. LVM to ask that she please call us back to schedule follow up appointment. If we are with patients and unable to answer she should leave message with a good time for us to call her back.

## 2019-11-22 NOTE — PROGRESS NOTES
Outpatient Psychiatry Follow-Up Visit (MD/NP)    11/25/2019    Clinical Status of Patient:  Outpatient (Ambulatory)    Chief Complaint:  Jojo Boateng is a 34 y.o. female who presents today for follow-up of mood disorder, anxiety and poor sleep.  Met with patient.      Interval History and Content of Current Session:  Interim Events/Subjective Report/Content of Current Session: Jojo  was last seen by me on 09/17/2019 for a follow up visit. Jojo was diagnosed with Bipolar disorder, moderate, depressed, with anxious distress and mood insomnia. Her symptoms were inadequately controlled and a plan of care was devised to include:    1. Safety: Call 911 or Crisis Line or go to ER for suicidal ideation, adverse effects of medication or any other emergency  2. Return to clinic in 3 weeks or sooner prn.   3. Restart Zyprexa 5 mg po qd.  4. Keep increased dose of Prozac 20 mg po qd.   5. hydroxyzine hcl 25 mg po qd PRN sleep/anxiety take 1-2 tabs bid prn.  6. D/C Vistaril which was started in [facility] (makes patient sleepy).    Today she is seen face to face. She is talking about her inpatient psychiatric stay from 09/10/2019 to 09/13/2019. She states that she subsequently went into the IOP from 09/13/2019 to 11/01/2019. She did not disclose that she was in the IOP program on her last visit. She states that she did not really like being inpatient at [facility] because of the detox patients. However she had an excellent experience in IOP. She learned a lot of coping skills. She states that while receiving IOP treatment at [facility] the Prozac was discontinued and she was started on Effexor XR 75 mg po qd and she states she was continued on the Olanzapine 5 mg po qhs. She states she took Trazodone once for sleep and this was ineffective at the 100 mg dose but the olanzapine is making her sleep well. She no longer uses the Atarax and feels that she does not need it any longer.  She does not have  her discharge paperwork from the IOP and is instructed to bring this on her next visit. Verbalizes understanding.     Her mood is good. Her anxiety has lessened and is pretty much under control. Her sleep is good. Her appetite is good. Her energy level is good. She states that she went back to work on November 2nd. She states she learned a lot in IOP and got some grief counseling.       Psychotherapy:  · Target symptoms: anxiety , mood disorder, poor sleep  · Why chosen therapy is appropriate versus another modality: relevant to diagnosis, evidence based practice  · Outcome monitoring methods: self-report, observation  · Therapeutic intervention type: supportive psychotherapy  · Topics discussed/themes: building skills sets for symptom management, medications, symptom improvement, IOP  · The patient's response to the intervention is accepting. The patient's progress toward treatment goals is good  · Duration of intervention: 19 minutes.    Review of Systems   PSYCHIATRIC: Pertinant items are noted in the narrative.  GENERAL:  Morbidly obese  SKIN:  Tattoos to arms bilaterally   HEAD:  No headache  EYES:  No exophthalmos, jaundice or blindness  EARS:  No hearing loss  MOUTH & THROAT:  No dyskinetic movements or obvious goiter  CHEST:  No shortness of breath  CARDIOVASCULAR:  No chest pain  ABDOMEN:  No nausea, vomiting, pain, constipation or diarrhea  URINARY:  No dysuria or sexual dysfunction  MUSCULOSKELETAL:  No tremor, no tic  NEUROLOGIC:  No abnormal movements    Past Medical, Family and Social History: The patient's past medical, family and social history have been reviewed and updated as appropriate within the electronic medical record - see encounter notes.    Compliance: yes    Side effects: None    Risk Parameters:  Patient reports no suicidal ideation  Patient reports no homicidal ideation  Patient reports no self-injurious behavior  Patient reports no violent behavior    Exam (detailed: at least 9  "elements; comprehensive: all 15 elements)   Constitutional  Vitals:  Most recent vital signs, dated less than 90 days prior to this appointment, were reviewed.   Vitals:    11/25/19 0725   BP: 126/72   Pulse: 79   Weight: (!) 145.1 kg (320 lb)   Height: 5' 6" (1.676 m)        General:  obese     Musculoskeletal  Muscle Strength/Tone:  no tremor, no tic   Gait & Station:  non-ataxic     Psychiatric  Speech:  no latency; no press   Mood & Affect:  "Good."  congruent and appropriate    Thought Process:  normal and logical   Associations:  intact   Thought Content:  normal, no suicidality, no homicidality, delusions, or paranoia   Insight:  has awareness of illness   Judgement: limited in relation to coping   Orientation:  grossly intact   Memory: intact for content of interview   Language: grossly intact   Attention Span & Concentration:  able to focus   Fund of Knowledge:  intact and appropriate to age and level of education     Assessment and Diagnosis   Status/Progress: Based on the examination today, the patient's problem(s) is/are improved.  New problems have not been presented today.   Lack of compliance are not complicating management of the primary condition.  There are no active rule-out diagnoses for this patient at this time.    General Impression: she has improved.       ICD-10-CM ICD-9-CM   1. Bipolar I disorder, moderate, current or most recent episode depressed, with anxious distress F31.32 296.52   2. Mood insomnia F51.05 [id number]    F39    3. ARTURO (generalized anxiety disorder) F41.1 300.02       Intervention/Counseling/Treatment Plan   · Medication Management: The risks and benefits of medication were discussed with the patient.  · The treatment plan and follow up plan were reviewed with the patient.   1. Safety: Call 911 or Crisis Line or go to ER for suicidal ideation, adverse effects of medication or any other emergency  2. Return to clinic in 3 months or sooner prn.   3. Continue  Zyprexa 5 mg po " qd.  4. Discontinue Prozac 20 mg po qd.   5. Continue Effexor XR 75 mg po qd as reportedly started in IOP.   6. Discontinue hydroxyzine hcl 25 mg po qd PRN sleep/anxiety take 1-2 tabs bid prn.  7. Discontiue Trazodone.        Patient agrees with POC.     INSTRUCTIONS  Instructed to call 911 or Crisis Line or go to ER for suicidal ideation, adverse effects of medication or any other emergency. Verbalizes understanding and plan to comply.      Return to Clinic: 3 months, as needed

## 2019-11-25 ENCOUNTER — OFFICE VISIT (OUTPATIENT)
Dept: PSYCHIATRY | Facility: CLINIC | Age: 34
End: 2019-11-25
Payer: COMMERCIAL

## 2019-11-25 VITALS
WEIGHT: 293 LBS | BODY MASS INDEX: 47.09 KG/M2 | HEIGHT: 66 IN | SYSTOLIC BLOOD PRESSURE: 126 MMHG | HEART RATE: 79 BPM | DIASTOLIC BLOOD PRESSURE: 72 MMHG

## 2019-11-25 DIAGNOSIS — F51.05 MOOD INSOMNIA: ICD-10-CM

## 2019-11-25 DIAGNOSIS — F39 MOOD INSOMNIA: ICD-10-CM

## 2019-11-25 DIAGNOSIS — F41.1 GAD (GENERALIZED ANXIETY DISORDER): ICD-10-CM

## 2019-11-25 DIAGNOSIS — F31.32 BIPOLAR I DISORDER, MODERATE, CURRENT OR MOST RECENT EPISODE DEPRESSED, WITH ANXIOUS DISTRESS: Primary | ICD-10-CM

## 2019-11-25 PROCEDURE — 99214 OFFICE O/P EST MOD 30 MIN: CPT | Mod: S$GLB,,, | Performed by: NURSE PRACTITIONER

## 2019-11-25 PROCEDURE — 99999 PR PBB SHADOW E&M-EST. PATIENT-LVL III: CPT | Mod: PBBFAC,,, | Performed by: NURSE PRACTITIONER

## 2019-11-25 PROCEDURE — 99999 PR PBB SHADOW E&M-EST. PATIENT-LVL III: ICD-10-PCS | Mod: PBBFAC,,, | Performed by: NURSE PRACTITIONER

## 2019-11-25 PROCEDURE — 99214 PR OFFICE/OUTPT VISIT, EST, LEVL IV, 30-39 MIN: ICD-10-PCS | Mod: S$GLB,,, | Performed by: NURSE PRACTITIONER

## 2019-11-25 RX ORDER — VENLAFAXINE HYDROCHLORIDE 75 MG/1
75 CAPSULE, EXTENDED RELEASE ORAL DAILY
Qty: 90 CAPSULE | Refills: 0 | Status: SHIPPED | OUTPATIENT
Start: 2019-11-25 | End: 2020-02-26

## 2019-11-25 RX ORDER — OLANZAPINE 5 MG/1
5 TABLET ORAL NIGHTLY
Qty: 90 TABLET | Refills: 0 | Status: SHIPPED | OUTPATIENT
Start: 2019-11-25 | End: 2020-02-26 | Stop reason: SDUPTHER

## 2019-11-25 NOTE — PATIENT INSTRUCTIONS
"You have been provided with a certain amount of medication with a specified number of refills.  Please follow up within an adequate time before you run out of medications.    REFILLS FOR CONTROLLED SUBSTANCES WILL NOT BE GIVEN WITHOUT AN APPOINTMENT.  I will not honor or fill automated refill requests from pharmacies.  You must come in for an appointment to get refills.    Please book your next appointment for myself or therapist by phone by calling our office at [phone].      PLEASE BE AT LEAST 15 MINUTES EARLY FOR YOUR NEXT APPOINTMENT.  PLEASE, DO NOT BE LATE OR YOU WILL BE TURNED AWAY AND ASKED TO RESCHEDULE.  YOU MUST COME EARLY TO ALLOW TIME FOR CHECK-IN AS WELL AS GET YOUR VITAL SIGNS AND GO OVER YOUR MEDICATIONS.  [facility] is not fair to the patients who present after you and are on time for their appointments.  It causes a delay in the appointments for patients and staff.  IF YOU ARE LATE, THERE IS A POSSIBILITY THAT YOU WILL BE CHARGED FOR THE APPOINTMENT TIME PERSONALLY AND IT WILL NOT GO TO YOUR INSURANCE.  YOU MAY ALSO BE DISCHARGED FROM CLINIC with multiple "No Show" appointments.  -----------------------------------------------------------------------------------------------------------------  IF YOU FEEL SUICIDAL OR HAVING THOUGHTS OR PLANS TO HURT YOURSELF OR OTHERS, CALL 911 OR REPORT TO THE NEAREST EMERGENCY ROOM.  YOU CAN ALSO ACCESS THE FOLLOWING HOTLINE(S):    National Suicide Hotline Number 0-355-317-TALK (6062)     ([facility] Mobile Crisis, [phone]'   [facility] Crisis Line, [phone]) 842-8287; [place], 24 hours / 7 days, (246) 936-COPE (5734), 2-415-987-COPE (0335))     TIPS FOR GETTING YOUR PRESCRIPTIONS:    You can always ask your pharmacist the cost of your medications without the use of your insurance. Sometimes the medication will be cheaper if you do not use your insurance.     If you decide you want to have your prescriptions filled at " a different pharmacy, you can always go to the new pharmacy of your choice and have them call the pharmacy where your prescription was sent and they can have your prescription transferred to the new pharmacy.

## 2020-02-17 ENCOUNTER — TELEPHONE (OUTPATIENT)
Dept: PSYCHIATRY | Facility: CLINIC | Age: 35
End: 2020-02-17

## 2020-02-17 NOTE — TELEPHONE ENCOUNTER
Spoke to patient to let her know that we will need to cancel her appointment with Tanya Herndon NP on 2/21 because provider is no longer here. Dr Sullivan will send a 3 month refill for Rx's during which time she will need to try and get set up with someone at [facility] or someone of her own choosing.

## 2020-02-26 ENCOUNTER — OFFICE VISIT (OUTPATIENT)
Dept: PSYCHIATRY | Facility: CLINIC | Age: 35
End: 2020-02-26
Payer: COMMERCIAL

## 2020-02-26 VITALS
SYSTOLIC BLOOD PRESSURE: 141 MMHG | WEIGHT: 293 LBS | HEIGHT: 66 IN | HEART RATE: 86 BPM | BODY MASS INDEX: 47.09 KG/M2 | DIASTOLIC BLOOD PRESSURE: 89 MMHG

## 2020-02-26 DIAGNOSIS — F31.32 BIPOLAR I DISORDER, MODERATE, CURRENT OR MOST RECENT EPISODE DEPRESSED, WITH ANXIOUS DISTRESS: Primary | ICD-10-CM

## 2020-02-26 DIAGNOSIS — F41.1 GAD (GENERALIZED ANXIETY DISORDER): ICD-10-CM

## 2020-02-26 PROCEDURE — 99213 OFFICE O/P EST LOW 20 MIN: CPT | Mod: S$GLB,,, | Performed by: STUDENT IN AN ORGANIZED HEALTH CARE EDUCATION/TRAINING PROGRAM

## 2020-02-26 PROCEDURE — 99213 PR OFFICE/OUTPT VISIT, EST, LEVL III, 20-29 MIN: ICD-10-PCS | Mod: S$GLB,,, | Performed by: STUDENT IN AN ORGANIZED HEALTH CARE EDUCATION/TRAINING PROGRAM

## 2020-02-26 PROCEDURE — 99999 PR PBB SHADOW E&M-EST. PATIENT-LVL II: CPT | Mod: PBBFAC,,, | Performed by: STUDENT IN AN ORGANIZED HEALTH CARE EDUCATION/TRAINING PROGRAM

## 2020-02-26 PROCEDURE — 99999 PR PBB SHADOW E&M-EST. PATIENT-LVL II: ICD-10-PCS | Mod: PBBFAC,,, | Performed by: STUDENT IN AN ORGANIZED HEALTH CARE EDUCATION/TRAINING PROGRAM

## 2020-02-26 RX ORDER — VENLAFAXINE HYDROCHLORIDE 150 MG/1
150 CAPSULE, EXTENDED RELEASE ORAL DAILY
Qty: 30 CAPSULE | Refills: 2 | Status: SHIPPED | OUTPATIENT
Start: 2020-02-26 | End: 2020-03-27

## 2020-02-26 RX ORDER — OLANZAPINE 5 MG/1
5 TABLET ORAL NIGHTLY
Qty: 30 TABLET | Refills: 2 | Status: SHIPPED | OUTPATIENT
Start: 2020-02-26 | End: 2022-10-02

## 2020-02-26 NOTE — PROGRESS NOTES
Outpatient Psychiatry Follow-Up Visit (MD/NP)    2/26/2020    Clinical Status of Patient:  Outpatient (Ambulatory)    Chief Complaint:  Jojo Boateng is a 34 y.o. female who presents today for follow-up of anxiety and bipolar disorder.  Met with patient.      Interval History and Content of Current Session:  Interim Events/Subjective Report/Content of Current Session:     Pt presents with a recent [facility] admission at [facility] for suicidal ideation (no plan or intent), which culminated into a 3 day admission, followed by a 6 week IOP. At that time, pt was being seen by Dr. Sosa, and trialed on Effexor 75mg PO daily and Zyprexa 5mg PO qHS. She stayed with him through the IOP, and because he only sees IOP patients, she was forced to return to Tanya Espinoza, who had been her outpt provider since July of 2019. Patient was informed last week, that Ms. Espinoza was no longer with [facility] and that her care would be transferred to [facility].    Pt reports a prior psych history of bipolar 1 disorder, generalized anxiety disorder, and major depressive disorder. Pt reports being diagnosed with anxiety and depression in her adolescence, and wasn't until her early 20s that she was evaluated and diagnosed with bipolar 1 disorder. Pt reports her first manic episode (with subsequent admission for 11 days) consisting of sxs (at that time), spending spree, grandiosity, impulsive (sexual) behaviors, decreased need for sleep (up to 48hrs with no sleep), flight of ideas, distractibility, and pressured speech. Since that time she has had 3 other admission (plus one in adolescence for SI), totaling 5 hospitalizations. As stated above, most recent occurring September, with initiation of effexor and zyprexa, which has continued and are her current medication regimen. Patient reports medical stabilization on said regimen, but has noticed some affective instability, as of late, and was planning (prior to disclosure that her  current provider was no longer with the [facility]) to speak with Ms. Herndon about increasing her effexor. After further discussion with this writer, patient was amenable to an increase of 75mg totaling 150mg PO daily to address said mood changes/swings, with continuation of Zyprexa at current dose of 5mg PO qHS. Pt denied any thoughts/intent/plan to slef-harm, harm others, or perceptual disturbances, and was amenable to returning to clinic in 1 month for follow up.    Psychiatric History:  Diagnose(s): Yes - bipolar 1 disorder, generalized anxiety disorder, major depressive disorder  Previous Medication Trials: Yes - latuda, lexapro, prozac, zoloft, VPA, effexor, zyprexa  Previous Psychiatric Hospitalizations: Yes - 4, most recent in September 13th 2019 in the context of suicidal ideation  Family Psychiatric History: No  Outpatient Psychiatrist: Yes - until recently (one week ago), being informed that her outpt provider was no longer with the [facility], and that she would need to have care transferred to [facility]    Suicide/Violence Risk Assessment:  Current/active suicidal ideation/plan/intent: No  Previous suicide attempts: Yes - 5x (all overdoses), the most recent in 05/2018 in the setting of overdose on klonopin  Current/active homicidal ideation/plan/intent: No  History of threats/arrests associated with violent conduct: No  Access to firearms/lethal weapons: No    Social History:  Marital Status: single  Children: 1   Employment Status: currently employed, at a juvenile [employer]  Education: some college  Special Ed: no  Housing Status: Yes - living in an apartment on the [facility] with her adopted son  Developmental History: No  History of Abuse: No    Substance Abuse History:  Recreational Drugs: marijuana  Use of Alcohol: denied  Tobacco Use: No, quit smoking in 2013  Use of OTC: No  Detox/Rehab History: No  Is the patient aware of the biomedical complications associated with substance abuse and  "mental illness? Yes -   Legal consequences of chemical use: No    Legal History:  Past Charges/Incarcerations: No  Pending Charges: No    Psychosocial Factors:  Stressors: family, financial and occupational.   Maladaptive or problem behaviors: No  Functioning Relationships: strained with significant other  Living situation, family constellation, family circumstances/home: No  Recovery environment: Yes -   Peer group, social, ethic, cultural, emotional, and health factors: No  Community resources used by patient: No  Treatment acceptance/motivation for change: Yes -     Review of Systems   · PSYCHIATRIC: Pertinant items are noted in the narrative.  · CONSTITUTIONAL: No weight gain or loss.   · NEUROLOGIC: No weakness, sensory changes, seizures, confusion, memory loss, tremor or other abnormal movements.  · ENT: No dizziness, tinnitus or hearing loss.  · RESPIRATORY: No shortness of breath.  · CARDIOVASCULAR: No tachycardia or chest pain.  · GASTROINTESTINAL: No nausea, vomiting, pain, constipation or diarrhea.    Past Medical, Family and Social History: The patient's past medical, family and social history have been reviewed and updated as appropriate within the electronic medical record - see encounter notes.    Compliance: yes    Side effects: None    Risk Parameters:  Patient reports no suicidal ideation  Patient reports no homicidal ideation  Patient reports no self-injurious behavior  Patient reports no violent behavior    Exam (detailed: at least 9 elements; comprehensive: all 15 elements)   Constitutional  Vitals:  Most recent vital signs, dated less than 90 days prior to this appointment, were reviewed.   Vitals:    02/26/20 1355   BP: (!) 141/89   Pulse: 86   Weight: (!) 143.6 kg (316 lb 11.1 oz)   Height: 5' 6" (1.676 m)        General:  unremarkable, age appropriate     Musculoskeletal  Muscle Strength/Tone:  not examined   Gait & Station:  non-ataxic     Psychiatric  Speech:  no latency; no press   Mood & " Affect:  euthymic  congruent and appropriate   Thought Process:  normal and logical   Associations:  intact   Thought Content:  normal, no suicidality, no homicidality, delusions, or paranoia   Insight:  intact   Judgement: behavior is adequate to circumstances   Orientation:  grossly intact   Memory: intact for content of interview   Language: grossly intact   Attention Span & Concentration:  able to focus   Fund of Knowledge:  intact and appropriate to age and level of education     Assessment and Diagnosis   Status/Progress: Based on the examination today, the patient's problem(s) is/are well controlled.  New problems have not been presented today.   Co-morbidities are not complicating management of the primary condition.  There are no active rule-out diagnoses for this patient at this time.     General Impression:     BP1D  ARTURO      ICD-10-CM ICD-9-CM   1. Bipolar I disorder, moderate, current or most recent episode depressed, with anxious distress F31.32 296.52   2. ARTURO (generalized anxiety disorder) F41.1 300.02       Intervention/Counseling/Treatment Plan   · Medication Management: Continue current medications. The risks and benefits of medication were discussed with the patient.   · Increase Effexor XR to 150mg PO daily  · Continue Zyprexa 5mg PO qHS  · Discussed with patient, the potential adverse effects of Effexor, including, but not limited to, gastrointestinal side effects (diarrhea, decreased appetite), sexual dysfunction, headache, hypertension (at higher doses), nervousness, increased suicidal thoughts upon initiation, discontinuation syndrome (dizziness, nausea, stomach cramps, sweating, tingling, dysthesias), and serotonin syndrome, described as altered mental status with increased neuromuscular activity (tremor, agitation, exaggerated reflexes, rhythmic muscle spasms - clonus), and autonomic instability (diarrhea, diaphoresis - sweating, hyperthermia - fever), often occurring hours to within one  day of starting or increasing the dose of a medication, which can lead to rhabdomyolysis (skeletal muscle destruction/breakdown), coma, and death. The patient expresses a factual and rational understanding of the above as well as the inherent unpredictability of said treatment versus refusal of the option offered, and displays the capacity to agree with this treatment given said understanding. The patient also agrees that, currently, the benefits outweigh the risks, and would like to pursue said treatment at this time.  Discussed with patient, the potential adverse effects of Second Generation Antipsychotics, including, but not limited to, hypotension, akathisia, dystonia, parkinsonism, tardive dyskinesia, and neuroleptic malignant syndrome. Also discussed the risk for metabolic syndrome, and need for regular monitoring of weight, BMI, lipids, and glucose. The patient expresses a factual and rational understanding of the above as well as the inherent unpredictability of said treatment versus refusal of the option offered, and displays the capacity to agree with this treatment given said understanding. The patient also agrees that, currently, the benefits outweigh the risks, and would like to pursue treatments at this time.  > than 50% of total time spent on coordination of care and counseling (which included patient's differential diagnoses and prognoses for her psychiatric conditions; the risks, benefits of treatments, instructions and adherence to treatment plan; risk reduction; review of current psychotropic medication regimen, social stressors, and any medical problems. In addtion to discussion with other healthcare providers.)  Encouraged patient to keep future appointments, to take medications as prescribed, and to abstain from substance abuse. The importance of compliance with chosen treatment options was emphasized. The treatment plan and follow up plan were reviewed with the patient. In the event of an  emergency, the patient was advised to go to the emergency room.    Return to Clinic: 1 month     Surinder Jones MD, JOSE DE JESUS  [facility], PGY-3  Pager Number [phone]  [facility]

## 2020-02-27 NOTE — PROGRESS NOTES
Pt with bipolar disorder and previous hospitalizations is doing well on Effexor and zyprexa.  Agree with assessment and plan.

## 2020-03-24 ENCOUNTER — TELEPHONE (OUTPATIENT)
Dept: PSYCHIATRY | Facility: CLINIC | Age: 35
End: 2020-03-24

## 2020-03-24 NOTE — TELEPHONE ENCOUNTER
SW called pt to assist with set up for upcoming virtual visit with Dr. Jones. Pt expressed understanding of set up. Additionally SW provided pt with tech support number and psych department number.

## 2021-01-06 ENCOUNTER — LAB VISIT (OUTPATIENT)
Dept: PRIMARY CARE CLINIC | Facility: OTHER | Age: 36
End: 2021-01-06
Attending: INTERNAL MEDICINE
Payer: COMMERCIAL

## 2021-01-06 DIAGNOSIS — Z03.818 ENCOUNTER FOR OBSERVATION FOR SUSPECTED EXPOSURE TO OTHER BIOLOGICAL AGENTS RULED OUT: ICD-10-CM

## 2021-01-06 PROCEDURE — U0003 INFECTIOUS AGENT DETECTION BY NUCLEIC ACID (DNA OR RNA); SEVERE ACUTE RESPIRATORY SYNDROME CORONAVIRUS 2 (SARS-COV-2) (CORONAVIRUS DISEASE [COVID-19]), AMPLIFIED PROBE TECHNIQUE, MAKING USE OF HIGH THROUGHPUT TECHNOLOGIES AS DESCRIBED BY CMS-2020-01-R: HCPCS

## 2021-01-08 LAB — SARS-COV-2 RNA RESP QL NAA+PROBE: NOT DETECTED

## 2021-03-24 NOTE — PROGRESS NOTES
INTERVENTIONAL CARDIOLOGY OPERATIVE NOTE: TRANSFEMORAL GILES 3 TAVR    PREOPERATIVE DIAGNOSIS:  Symptomatic severe aortic valve stenosis    POSTOPERATIVE DIAGNOSIS:  Symptomatic severe aortic valve stenosis    PROCEDURES PERFORMED:   1. Implantation of catheter-delivered prosthetic aortic heart valve (29 mm Giles 3 with 3 mL extra volume); percutaneous left femoral artery approach (CPT 15427). 2. Percutaneous left femoral artery access under fluoroscopic and ultrasound guidance. 3. Percutaneous right femoral artery access (CPT A945815) under fluoroscopic and ultrasound guidance (CPT 72901). 4. Introduction of catheter aorta, bilateral (CPT 45797-38). 5. Aortoiliac arteriogram (CPT 74718). 6. Percutaneous right femoral venous access under fluoroscopic guidance. 7. Insertion of temporary transvenous pacemaker (CPT 48835)     CASE SUMMARY:  1. Successful percutaneous left transfemoral TAVR using a 29 mm Giles 3 THV with 3 mL extra volume  2. Brief widening of QRS immediately after valve deployment and immediately after post-dilatation with quick return to narrow QRS and no final change in native conduction immediately post-TAVR    CO-SURGEONS:   Dr. Cami Johnson. Issa Payor  Dr. Janice Menjivar    ASSISTING:  None    ANESTHESIA:   MAC    CLINICAL HISTORY:   Ramon Matos is a 66 y.o. male with severe, symptomatic aortic stenosis. He was evaluated and determined to be low risk for conventional aortic valve replacement surgery. As such, he has been consented for FDA-approved commercial TAVR use, and is now taken to the operating room for endovascular transcatheter aortic valve replacement. The risks of the procedure including death, stroke, vascular injury, the need for conversion to open surgery, transfusion and the need for permanent pacemaker were discussed with the patient and his family in detail. He signed informed consent. Surgical priority is elective.      DESCRIPTION OF PROCEDURE:   After informed Subjective:      Patient ID: Jojo Boateng is a 31 y.o. female.    Chief Complaint: No chief complaint on file.    HPI  31 year old female presents with chief complaint of right knee pain x 2-3 weeks. She was playing around with kids and felt her knee popped. The next day it was swollen and she had decreased flexion. Pain is worse with prolonged standing and walking. She is taking ibuprofen and T3 with some relief. She reports popping, locking, and giving way.   Review of Systems   Constitution: Negative for chills, fever and night sweats.   Cardiovascular: Negative for chest pain.   Respiratory: Negative for cough and shortness of breath.    Hematologic/Lymphatic: Does not bruise/bleed easily.   Skin: Negative for color change.   Gastrointestinal: Negative for heartburn.   Genitourinary: Negative for dysuria.   Neurological: Negative for numbness and paresthesias.   Psychiatric/Behavioral: Negative for altered mental status.   Allergic/Immunologic: Negative for persistent infections.         Objective:            Ortho/SPM Exam  General :   alert, appears stated age and cooperative   Gait: Antalgic. The patient can bear weight on the injured extremity.   Right Lower Extremity  Hip Palpation:  no tenderness over the greater  trochanter   Hip ROM: 100% of normal    Knee Effusion:  0-1+   Ecchymosis:  none   Knee ROM:  0 to 110 degrees without subpatellar   crepitance.   Patella:  Patella does not track normally.  Patellar apprehension test: negative  Patellar compression test: negative   Tenderness: medial joint line and lateral joint line   Stability:  Lachman's test: negative  Posterior drawer: negative  Medial collateral ligament: negative  Lateral collateral ligament: negative         Agapito's Test:  Mild pain   Sensation:   intact to light touch   Pulses: normal DP and PT pulses         X-ray: ordered and reviewed by myself. No fx or dislocation.         Assessment:       Encounter Diagnosis   Name Primary?     Right knee pain, unspecified chronicity Yes          Plan:       Patient already has an MRI scheduled for next week. Continue nsaid, ice, and elevation. RTC pending results. Patient will call clinic for results/plan.              consent was obtained, the patient was brought to the operating room and positioned supine on the hybrid OR table. Prophylactic antibiotics were administered preoperatively. Invasive monitoring lines were placed by the cardiothoracic anesthesia team. The patient was prepped and draped from the chin to mid-thighs. Surgical time-out was performed. Under ultrasound guidance, the left common femoral artery is accessed percutaneously and a #6-Haitian sheath is placed. A J-tipped wire is placed in the descending thoracic aorta. Two separate Perclose devices are deployed for pre-closure. Over an Amplatz super stiff wire with a hand-fashioned J-tip the #6-Haitian sheath is exchanged for a #16-Haitian E-sheath. The sheath is flushed and then sewn into place. The right common femoral artery is accessed via micropuncture technique with radiographic and ultrasound guidance and a #6-Haitian sheath is placed there. The right femoral vein is accessed percutaneously, and a #6-Haitian venous sheath is placed under fluoroscopic guidance there. The patient is systemically heparinized with 100 units per kg of IV heparin to a goal ACT greater than 250. A transvenous pacing lead is advanced from the right femoral venous sheath up to the right ventricle. The pacemaker is tested and has good pacing capture at <1 mA. A pigtail catheter is advanced via the right femoral artery sheath over a J-tipped wire and positioned in the right-coronary sinus of Valsalva. A thoracic aortogram is obtained to determine the coplanar angle for valve implantation. Calcification of the aortic valve leaflets is evident. The aortic valve is then crossed using an AL-1 catheter and an 0.035\" straight tip guidewire. Simultaneous LV and ascending aortic pressures are recorded. A Confida 0.035\" guidewire is positioned in the LV apex.  Care is taken to avoid contact with the ventricular wall by the transition point of the wire to avoid LV perforation. A balloon aortic valvuloplasty is performed using an Gomez 25 mm x 4 mm balloon. Next, a 29 mm Giles 3 device is advanced via the #16-Bolivian E-sheath and positioned in the descending aorta. The valve is mounted onto the balloon. The camera is then placed Kinyarwanda and the valve is carefully advanced across the aortic arch while applying flexion to the delivery system to prevent trauma to the aortic arch. The camera is then placed back into the coplanar deployment angle and the valve is positioned across the aortic valve. A thoracic aortogram is obtained to correctly position the valve across the native aortic valve. Under rapid ventricular pacing at 200 beats per minute, the valve is carefully and deliberately deployed with 2 mL extra volume (35 mL total volume) and seats in a good position at a depth of 95 aortic / 5 ventricular on the non-coronary cusp side and 85 aortic / 15 ventricular on the left coronary cusp side. Transthoracic echocardiography shows excellent valve position with no significant paravalvular and trace wire-related valvular aortic insufficiency. After deployment, transvalvular gradient is measured and is minimal. The left ventricular end-diastolic pressure is similar from predeployment, with a well preserved end-diastolic pressure gradient comparing the central aortic diastolic pressure simultaneously with the LVEDP, suggesting physiologically insignificant aortic insufficiency. Thoracic aortography is performed and shows mild paravalvular aortic insufficiency, originating mainly near the non-coronary cusp. Due to the patient's young age and robust baseline functional status, the decision is made to post-dilate the valve by adding 1 more mL to the deployment balloon (36 mL total volume).      Under rapid ventricular pacing at 200 beats per minute, the valve is carefully post-dilated with 3 mL extra volume (36 mL total volume) and the valve visually expands and foreshortens a small amount. Repeat thoracic aortography shows a final depth of 98 aortic / 2 ventricular on the non-coronary cusp side and 85 aortic / 15 ventricular on the left coronary cusp side; excellent valve position with now trivial paravalvular aortic insufficiency. Repeat transthoracic echocardiography shows no paravalvular or valvular aortic insufficiency. Satisfied with the valve position and function, we turn our attention to the access sites. The #16-[place]ian E-sheath on the left is removed and the two Perclose devices deployed. Next, the pigtail catheter is pulled down into the abdominal aorta and an iliofemoral pelvic arteriogram is taken. This demonstrates good flow bilaterally without dissection. There is an excellent pulse in the artery after repair completion. The #6-[place]ian right femoral arterial sheath is removed and successful hemostasis is obtained with a #6-[place]ian AngioSeal.    The #6-[place]ian right femoral venous sheath is removed and successful hemostasis is obtained with manual compression in the hybrid OR per standard protocol.     Protamine is administered and hemostasis is obtained. The intraoperative ECG monitoring at the end of the case shows NSR with a narrow QRS. No high degree AV block is present. Estimated blood loss: < 30 cc     Complications: None    Disposition: CVICU    All sponge, needle, and instrument counts are reported correct at the end of the case. The patient is taken to the CVICU in stable condition at the end of procedure. Dr. Tanya Choudhary and I jointly performed the procedure and all of the critical components. The immediate results of the valve surgery were discussed with the patient's family. I, as co-surgeon, was scrubbed and present for the entire procedure.      Andres Moreira.  Kevin Tolentino, [id number] [address] [facility]  03/24/21

## 2022-01-30 ENCOUNTER — ANESTHESIA EVENT (OUTPATIENT)
Dept: SURGERY | Facility: HOSPITAL | Age: 37
End: 2022-01-30
Payer: COMMERCIAL

## 2022-01-30 ENCOUNTER — ANESTHESIA (OUTPATIENT)
Dept: SURGERY | Facility: HOSPITAL | Age: 37
End: 2022-01-30
Payer: COMMERCIAL

## 2022-01-30 ENCOUNTER — HOSPITAL ENCOUNTER (OUTPATIENT)
Facility: HOSPITAL | Age: 37
Discharge: HOME | End: 2022-01-31
Attending: EMERGENCY MEDICINE | Admitting: HOSPITALIST
Payer: COMMERCIAL

## 2022-01-30 DIAGNOSIS — R07.9 CHEST PAIN: ICD-10-CM

## 2022-01-30 DIAGNOSIS — K80.20 CALCULUS OF GALLBLADDER WITHOUT CHOLECYSTITIS WITHOUT OBSTRUCTION: ICD-10-CM

## 2022-01-30 DIAGNOSIS — K80.20: ICD-10-CM

## 2022-01-30 DIAGNOSIS — R10.9 ABDOMINAL PAIN, UNSPECIFIED ABDOMINAL LOCATION: Primary | ICD-10-CM

## 2022-01-30 DIAGNOSIS — K80.50 BILIARY COLIC: ICD-10-CM

## 2022-01-30 PROBLEM — I10 ESSENTIAL HYPERTENSION: Status: ACTIVE | Noted: 2022-01-30

## 2022-01-30 LAB
ALBUMIN SERPL BCP-MCNC: 4.1 G/DL (ref 3.5–5.2)
ALP SERPL-CCNC: 64 U/L (ref 55–135)
ALT SERPL W/O P-5'-P-CCNC: 20 U/L (ref 10–44)
ANION GAP SERPL CALC-SCNC: 9 MMOL/L (ref 8–16)
AST SERPL-CCNC: 17 U/L (ref 10–40)
B-HCG UR QL: NEGATIVE
BASOPHILS # BLD AUTO: 0 K/UL (ref 0–0.2)
BASOPHILS NFR BLD: 0 % (ref 0–1.9)
BILIRUB SERPL-MCNC: 0.6 MG/DL (ref 0.1–1)
BILIRUB UR QL STRIP: NEGATIVE
BUN SERPL-MCNC: 16 MG/DL (ref 6–20)
CALCIUM SERPL-MCNC: 8.6 MG/DL (ref 8.7–10.5)
CHLORIDE SERPL-SCNC: 104 MMOL/L (ref 95–110)
CLARITY UR: CLEAR
CO2 SERPL-SCNC: 23 MMOL/L (ref 23–29)
COLOR UR: YELLOW
CREAT SERPL-MCNC: 0.8 MG/DL (ref 0.5–1.4)
CTP QC/QA: YES
DIFFERENTIAL METHOD: ABNORMAL
EOSINOPHIL # BLD AUTO: 0 K/UL (ref 0–0.5)
EOSINOPHIL NFR BLD: 0 % (ref 0–8)
ERYTHROCYTE [DISTWIDTH] IN BLOOD BY AUTOMATED COUNT: 14.2 % (ref 11.5–14.5)
EST. GFR  (AFRICAN AMERICAN): >60 ML/MIN/1.73 M^2
EST. GFR  (NON AFRICAN AMERICAN): >60 ML/MIN/1.73 M^2
GLUCOSE SERPL-MCNC: 92 MG/DL (ref 70–110)
GLUCOSE UR QL STRIP: NEGATIVE
HCT VFR BLD AUTO: 35 % (ref 37–48.5)
HGB BLD-MCNC: 11.4 G/DL (ref 12–16)
HGB UR QL STRIP: NEGATIVE
IMM GRANULOCYTES # BLD AUTO: 0.02 K/UL (ref 0–0.04)
IMM GRANULOCYTES NFR BLD AUTO: 0.4 % (ref 0–0.5)
KETONES UR QL STRIP: NEGATIVE
LEUKOCYTE ESTERASE UR QL STRIP: NEGATIVE
LIPASE SERPL-CCNC: 36 U/L (ref 4–60)
LYMPHOCYTES # BLD AUTO: 1.9 K/UL (ref 1–4.8)
LYMPHOCYTES NFR BLD: 32.7 % (ref 18–48)
MCH RBC QN AUTO: 25.7 PG (ref 27–31)
MCHC RBC AUTO-ENTMCNC: 32.6 G/DL (ref 32–36)
MCV RBC AUTO: 79 FL (ref 82–98)
MONOCYTES # BLD AUTO: 0.5 K/UL (ref 0.3–1)
MONOCYTES NFR BLD: 8.5 % (ref 4–15)
NEUTROPHILS # BLD AUTO: 3.3 K/UL (ref 1.8–7.7)
NEUTROPHILS NFR BLD: 58.4 % (ref 38–73)
NITRITE UR QL STRIP: NEGATIVE
NRBC BLD-RTO: 0 /100 WBC
PH UR STRIP: 6 [PH] (ref 5–8)
PLATELET # BLD AUTO: 213 K/UL (ref 150–450)
PMV BLD AUTO: 9.3 FL (ref 9.2–12.9)
POTASSIUM SERPL-SCNC: 3.9 MMOL/L (ref 3.5–5.1)
PROT SERPL-MCNC: 7.1 G/DL (ref 6–8.4)
PROT UR QL STRIP: ABNORMAL
RBC # BLD AUTO: 4.44 M/UL (ref 4–5.4)
SARS-COV-2 RDRP RESP QL NAA+PROBE: NEGATIVE
SODIUM SERPL-SCNC: 136 MMOL/L (ref 136–145)
SP GR UR STRIP: 1.03 (ref 1–1.03)
URN SPEC COLLECT METH UR: ABNORMAL
UROBILINOGEN UR STRIP-ACNC: ABNORMAL EU/DL
WBC # BLD AUTO: 5.68 K/UL (ref 3.9–12.7)

## 2022-01-30 PROCEDURE — 00790 ANES IPER UPR ABD NOS: CPT | Performed by: SURGERY

## 2022-01-30 PROCEDURE — 63600175 PHARM REV CODE 636 W HCPCS: Performed by: EMERGENCY MEDICINE

## 2022-01-30 PROCEDURE — 85025 COMPLETE CBC W/AUTO DIFF WBC: CPT | Performed by: EMERGENCY MEDICINE

## 2022-01-30 PROCEDURE — 47562 PR LAP,CHOLECYSTECTOMY: ICD-10-PCS | Mod: ,,, | Performed by: SURGERY

## 2022-01-30 PROCEDURE — 27202107 HC XP QUATRO SENSOR: Performed by: STUDENT IN AN ORGANIZED HEALTH CARE EDUCATION/TRAINING PROGRAM

## 2022-01-30 PROCEDURE — 36000709 HC OR TIME LEV III EA ADD 15 MIN: Performed by: SURGERY

## 2022-01-30 PROCEDURE — 25000003 PHARM REV CODE 250: Performed by: STUDENT IN AN ORGANIZED HEALTH CARE EDUCATION/TRAINING PROGRAM

## 2022-01-30 PROCEDURE — 25000003 PHARM REV CODE 250: Performed by: HOSPITALIST

## 2022-01-30 PROCEDURE — C9290 INJ, BUPIVACAINE LIPOSOME: HCPCS | Performed by: SURGERY

## 2022-01-30 PROCEDURE — 27000080 OPTIME MED/SURG SUP & DEVICES GENERAL CLASSIFICATION: Performed by: SURGERY

## 2022-01-30 PROCEDURE — 47562 LAPAROSCOPIC CHOLECYSTECTOMY: CPT | Mod: ,,, | Performed by: SURGERY

## 2022-01-30 PROCEDURE — 37000008 HC ANESTHESIA 1ST 15 MINUTES: Performed by: SURGERY

## 2022-01-30 PROCEDURE — 71000039 HC RECOVERY, EACH ADD'L HOUR: Performed by: SURGERY

## 2022-01-30 PROCEDURE — 63600175 PHARM REV CODE 636 W HCPCS: Performed by: SURGERY

## 2022-01-30 PROCEDURE — 27201423 OPTIME MED/SURG SUP & DEVICES STERILE SUPPLY: Performed by: SURGERY

## 2022-01-30 PROCEDURE — 99204 OFFICE O/P NEW MOD 45 MIN: CPT | Mod: 57,,, | Performed by: SURGERY

## 2022-01-30 PROCEDURE — 99204 PR OFFICE/OUTPT VISIT, NEW, LEVL IV, 45-59 MIN: ICD-10-PCS | Mod: 57,,, | Performed by: SURGERY

## 2022-01-30 PROCEDURE — 99285 EMERGENCY DEPT VISIT HI MDM: CPT | Mod: 25

## 2022-01-30 PROCEDURE — 25000003 PHARM REV CODE 250: Performed by: SURGERY

## 2022-01-30 PROCEDURE — 27000284 HC CANNULA NASAL: Performed by: STUDENT IN AN ORGANIZED HEALTH CARE EDUCATION/TRAINING PROGRAM

## 2022-01-30 PROCEDURE — 71000033 HC RECOVERY, INTIAL HOUR: Performed by: SURGERY

## 2022-01-30 PROCEDURE — 27000654 HC CATH IV JELCO: Performed by: STUDENT IN AN ORGANIZED HEALTH CARE EDUCATION/TRAINING PROGRAM

## 2022-01-30 PROCEDURE — 36000708 HC OR TIME LEV III 1ST 15 MIN: Performed by: SURGERY

## 2022-01-30 PROCEDURE — G0378 HOSPITAL OBSERVATION PER HR: HCPCS

## 2022-01-30 PROCEDURE — 81003 URINALYSIS AUTO W/O SCOPE: CPT | Performed by: EMERGENCY MEDICINE

## 2022-01-30 PROCEDURE — 27201107 HC STYLET, STANDARD: Performed by: STUDENT IN AN ORGANIZED HEALTH CARE EDUCATION/TRAINING PROGRAM

## 2022-01-30 PROCEDURE — 63600175 PHARM REV CODE 636 W HCPCS: Performed by: STUDENT IN AN ORGANIZED HEALTH CARE EDUCATION/TRAINING PROGRAM

## 2022-01-30 PROCEDURE — 81025 URINE PREGNANCY TEST: CPT | Performed by: EMERGENCY MEDICINE

## 2022-01-30 PROCEDURE — 96365 THER/PROPH/DIAG IV INF INIT: CPT

## 2022-01-30 PROCEDURE — 96376 TX/PRO/DX INJ SAME DRUG ADON: CPT

## 2022-01-30 PROCEDURE — 96375 TX/PRO/DX INJ NEW DRUG ADDON: CPT

## 2022-01-30 PROCEDURE — 83690 ASSAY OF LIPASE: CPT | Performed by: EMERGENCY MEDICINE

## 2022-01-30 PROCEDURE — 37000009 HC ANESTHESIA EA ADD 15 MINS: Performed by: SURGERY

## 2022-01-30 PROCEDURE — 96366 THER/PROPH/DIAG IV INF ADDON: CPT | Mod: 59

## 2022-01-30 PROCEDURE — 63600175 PHARM REV CODE 636 W HCPCS: Performed by: HOSPITALIST

## 2022-01-30 PROCEDURE — 80053 COMPREHEN METABOLIC PANEL: CPT | Performed by: EMERGENCY MEDICINE

## 2022-01-30 PROCEDURE — 25000003 PHARM REV CODE 250: Performed by: NURSE ANESTHETIST, CERTIFIED REGISTERED

## 2022-01-30 PROCEDURE — U0002 COVID-19 LAB TEST NON-CDC: HCPCS | Performed by: EMERGENCY MEDICINE

## 2022-01-30 PROCEDURE — 63600175 PHARM REV CODE 636 W HCPCS: Performed by: NURSE ANESTHETIST, CERTIFIED REGISTERED

## 2022-01-30 RX ORDER — MORPHINE SULFATE 4 MG/ML
4 INJECTION, SOLUTION INTRAMUSCULAR; INTRAVENOUS EVERY 4 HOURS PRN
Status: DISCONTINUED | OUTPATIENT
Start: 2022-01-30 | End: 2022-01-31 | Stop reason: HOSPADM

## 2022-01-30 RX ORDER — KETAMINE HYDROCHLORIDE 50 MG/ML
INJECTION, SOLUTION INTRAMUSCULAR; INTRAVENOUS
Status: DISCONTINUED | OUTPATIENT
Start: 2022-01-30 | End: 2022-01-30

## 2022-01-30 RX ORDER — ONDANSETRON 2 MG/ML
4 INJECTION INTRAMUSCULAR; INTRAVENOUS EVERY 8 HOURS PRN
Status: DISCONTINUED | OUTPATIENT
Start: 2022-01-30 | End: 2022-01-31 | Stop reason: HOSPADM

## 2022-01-30 RX ORDER — ONDANSETRON 2 MG/ML
4 INJECTION INTRAMUSCULAR; INTRAVENOUS
Status: COMPLETED | OUTPATIENT
Start: 2022-01-30 | End: 2022-01-30

## 2022-01-30 RX ORDER — IBUPROFEN 200 MG
24 TABLET ORAL
Status: DISCONTINUED | OUTPATIENT
Start: 2022-01-30 | End: 2022-01-31 | Stop reason: HOSPADM

## 2022-01-30 RX ORDER — MIDAZOLAM HYDROCHLORIDE 1 MG/ML
INJECTION INTRAMUSCULAR; INTRAVENOUS
Status: DISCONTINUED | OUTPATIENT
Start: 2022-01-30 | End: 2022-01-30

## 2022-01-30 RX ORDER — HYDROCODONE BITARTRATE AND ACETAMINOPHEN 5; 325 MG/1; MG/1
1 TABLET ORAL EVERY 6 HOURS PRN
Status: DISCONTINUED | OUTPATIENT
Start: 2022-01-30 | End: 2022-01-30

## 2022-01-30 RX ORDER — OXYCODONE HYDROCHLORIDE 5 MG/1
5 TABLET ORAL
Status: DISCONTINUED | OUTPATIENT
Start: 2022-01-30 | End: 2022-01-30 | Stop reason: HOSPADM

## 2022-01-30 RX ORDER — SODIUM CHLORIDE 0.9 % (FLUSH) 0.9 %
10 SYRINGE (ML) INJECTION EVERY 12 HOURS PRN
Status: DISCONTINUED | OUTPATIENT
Start: 2022-01-30 | End: 2022-01-31 | Stop reason: HOSPADM

## 2022-01-30 RX ORDER — FENTANYL CITRATE 50 UG/ML
INJECTION, SOLUTION INTRAMUSCULAR; INTRAVENOUS
Status: DISCONTINUED | OUTPATIENT
Start: 2022-01-30 | End: 2022-01-30

## 2022-01-30 RX ORDER — NALOXONE HCL 0.4 MG/ML
0.02 VIAL (ML) INJECTION
Status: DISCONTINUED | OUTPATIENT
Start: 2022-01-30 | End: 2022-01-31 | Stop reason: HOSPADM

## 2022-01-30 RX ORDER — PANTOPRAZOLE SODIUM 20 MG/1
20 TABLET, DELAYED RELEASE ORAL DAILY
COMMUNITY
End: 2022-10-27 | Stop reason: ALTCHOICE

## 2022-01-30 RX ORDER — LANOLIN ALCOHOL/MO/W.PET/CERES
800 CREAM (GRAM) TOPICAL
Status: DISCONTINUED | OUTPATIENT
Start: 2022-01-30 | End: 2022-01-31 | Stop reason: HOSPADM

## 2022-01-30 RX ORDER — IBUPROFEN 200 MG
16 TABLET ORAL
Status: DISCONTINUED | OUTPATIENT
Start: 2022-01-30 | End: 2022-01-31 | Stop reason: HOSPADM

## 2022-01-30 RX ORDER — PROPOFOL 10 MG/ML
VIAL (ML) INTRAVENOUS
Status: DISCONTINUED | OUTPATIENT
Start: 2022-01-30 | End: 2022-01-30

## 2022-01-30 RX ORDER — PANTOPRAZOLE SODIUM 40 MG/1
40 TABLET, DELAYED RELEASE ORAL DAILY
Status: DISCONTINUED | OUTPATIENT
Start: 2022-01-30 | End: 2022-01-31 | Stop reason: HOSPADM

## 2022-01-30 RX ORDER — SODIUM,POTASSIUM PHOSPHATES 280-250MG
2 POWDER IN PACKET (EA) ORAL
Status: DISCONTINUED | OUTPATIENT
Start: 2022-01-30 | End: 2022-01-31 | Stop reason: HOSPADM

## 2022-01-30 RX ORDER — GLUCAGON 1 MG
1 KIT INJECTION
Status: DISCONTINUED | OUTPATIENT
Start: 2022-01-30 | End: 2022-01-31 | Stop reason: HOSPADM

## 2022-01-30 RX ORDER — LISINOPRIL 20 MG/1
20 TABLET ORAL DAILY
Status: DISCONTINUED | OUTPATIENT
Start: 2022-01-31 | End: 2022-01-31 | Stop reason: HOSPADM

## 2022-01-30 RX ORDER — OLANZAPINE 5 MG/1
5 TABLET ORAL NIGHTLY
Status: DISCONTINUED | OUTPATIENT
Start: 2022-01-30 | End: 2022-01-31 | Stop reason: HOSPADM

## 2022-01-30 RX ORDER — SUCCINYLCHOLINE CHLORIDE 20 MG/ML
INJECTION INTRAMUSCULAR; INTRAVENOUS
Status: DISCONTINUED | OUTPATIENT
Start: 2022-01-30 | End: 2022-01-30

## 2022-01-30 RX ORDER — SODIUM CHLORIDE, SODIUM LACTATE, POTASSIUM CHLORIDE, CALCIUM CHLORIDE 600; 310; 30; 20 MG/100ML; MG/100ML; MG/100ML; MG/100ML
INJECTION, SOLUTION INTRAVENOUS CONTINUOUS
Status: DISCONTINUED | OUTPATIENT
Start: 2022-01-30 | End: 2022-01-31 | Stop reason: HOSPADM

## 2022-01-30 RX ORDER — MEPERIDINE HYDROCHLORIDE 50 MG/ML
12.5 INJECTION INTRAMUSCULAR; INTRAVENOUS; SUBCUTANEOUS EVERY 10 MIN PRN
Status: DISCONTINUED | OUTPATIENT
Start: 2022-01-30 | End: 2022-01-30 | Stop reason: HOSPADM

## 2022-01-30 RX ORDER — PANTOPRAZOLE SODIUM 20 MG/1
20 TABLET, DELAYED RELEASE ORAL DAILY
Status: DISCONTINUED | OUTPATIENT
Start: 2022-01-31 | End: 2022-01-30

## 2022-01-30 RX ORDER — ACETAMINOPHEN 325 MG/1
650 TABLET ORAL EVERY 8 HOURS PRN
Status: DISCONTINUED | OUTPATIENT
Start: 2022-01-30 | End: 2022-01-31 | Stop reason: HOSPADM

## 2022-01-30 RX ORDER — SIMETHICONE 80 MG
1 TABLET,CHEWABLE ORAL 4 TIMES DAILY PRN
Status: DISCONTINUED | OUTPATIENT
Start: 2022-01-30 | End: 2022-01-31 | Stop reason: HOSPADM

## 2022-01-30 RX ORDER — HYDROMORPHONE HYDROCHLORIDE 1 MG/ML
0.5 INJECTION, SOLUTION INTRAMUSCULAR; INTRAVENOUS; SUBCUTANEOUS
Status: COMPLETED | OUTPATIENT
Start: 2022-01-30 | End: 2022-01-30

## 2022-01-30 RX ORDER — FAMOTIDINE 10 MG/ML
INJECTION INTRAVENOUS
Status: DISCONTINUED | OUTPATIENT
Start: 2022-01-30 | End: 2022-01-30

## 2022-01-30 RX ORDER — ONDANSETRON 2 MG/ML
4 INJECTION INTRAMUSCULAR; INTRAVENOUS DAILY PRN
Status: DISCONTINUED | OUTPATIENT
Start: 2022-01-30 | End: 2022-01-30 | Stop reason: HOSPADM

## 2022-01-30 RX ORDER — ROCURONIUM BROMIDE 10 MG/ML
INJECTION, SOLUTION INTRAVENOUS
Status: DISCONTINUED | OUTPATIENT
Start: 2022-01-30 | End: 2022-01-30

## 2022-01-30 RX ORDER — ACETAMINOPHEN 325 MG/1
650 TABLET ORAL EVERY 4 HOURS PRN
Status: DISCONTINUED | OUTPATIENT
Start: 2022-01-30 | End: 2022-01-31 | Stop reason: HOSPADM

## 2022-01-30 RX ORDER — SODIUM CHLORIDE, SODIUM LACTATE, POTASSIUM CHLORIDE, CALCIUM CHLORIDE 600; 310; 30; 20 MG/100ML; MG/100ML; MG/100ML; MG/100ML
INJECTION, SOLUTION INTRAVENOUS CONTINUOUS PRN
Status: DISCONTINUED | OUTPATIENT
Start: 2022-01-30 | End: 2022-01-30

## 2022-01-30 RX ORDER — BUPIVACAINE HYDROCHLORIDE AND EPINEPHRINE 2.5; 5 MG/ML; UG/ML
INJECTION, SOLUTION EPIDURAL; INFILTRATION; INTRACAUDAL; PERINEURAL
Status: DISCONTINUED | OUTPATIENT
Start: 2022-01-30 | End: 2022-01-30 | Stop reason: HOSPADM

## 2022-01-30 RX ORDER — ACETAMINOPHEN 10 MG/ML
INJECTION, SOLUTION INTRAVENOUS
Status: DISCONTINUED | OUTPATIENT
Start: 2022-01-30 | End: 2022-01-30

## 2022-01-30 RX ORDER — CEFOXITIN SODIUM 2 G/50ML
INJECTION, SOLUTION INTRAVENOUS
Status: DISCONTINUED | OUTPATIENT
Start: 2022-01-30 | End: 2022-01-30

## 2022-01-30 RX ORDER — LIDOCAINE HYDROCHLORIDE 10 MG/ML
INJECTION, SOLUTION EPIDURAL; INFILTRATION; INTRACAUDAL; PERINEURAL
Status: DISCONTINUED | OUTPATIENT
Start: 2022-01-30 | End: 2022-01-30

## 2022-01-30 RX ORDER — DEXAMETHASONE SODIUM PHOSPHATE 4 MG/ML
INJECTION, SOLUTION INTRA-ARTICULAR; INTRALESIONAL; INTRAMUSCULAR; INTRAVENOUS; SOFT TISSUE
Status: DISCONTINUED | OUTPATIENT
Start: 2022-01-30 | End: 2022-01-30

## 2022-01-30 RX ORDER — POLYETHYLENE GLYCOL 3350 17 G/17G
17 POWDER, FOR SOLUTION ORAL DAILY
Status: DISCONTINUED | OUTPATIENT
Start: 2022-01-31 | End: 2022-01-31 | Stop reason: HOSPADM

## 2022-01-30 RX ORDER — LISINOPRIL 20 MG/1
20 TABLET ORAL DAILY
COMMUNITY

## 2022-01-30 RX ORDER — HYDROMORPHONE HYDROCHLORIDE 1 MG/ML
0.2 INJECTION, SOLUTION INTRAMUSCULAR; INTRAVENOUS; SUBCUTANEOUS EVERY 5 MIN PRN
Status: DISCONTINUED | OUTPATIENT
Start: 2022-01-30 | End: 2022-01-30 | Stop reason: HOSPADM

## 2022-01-30 RX ORDER — TALC
6 POWDER (GRAM) TOPICAL NIGHTLY PRN
Status: DISCONTINUED | OUTPATIENT
Start: 2022-01-30 | End: 2022-01-31 | Stop reason: HOSPADM

## 2022-01-30 RX ORDER — AMLODIPINE BESYLATE 5 MG/1
5 TABLET ORAL
Status: ON HOLD | COMMUNITY
End: 2022-01-30

## 2022-01-30 RX ORDER — ONDANSETRON 2 MG/ML
INJECTION INTRAMUSCULAR; INTRAVENOUS
Status: DISCONTINUED | OUTPATIENT
Start: 2022-01-30 | End: 2022-01-30

## 2022-01-30 RX ORDER — DIPHENHYDRAMINE HYDROCHLORIDE 50 MG/ML
12.5 INJECTION INTRAMUSCULAR; INTRAVENOUS
Status: DISCONTINUED | OUTPATIENT
Start: 2022-01-30 | End: 2022-01-30 | Stop reason: HOSPADM

## 2022-01-30 RX ORDER — HYDROCODONE BITARTRATE AND ACETAMINOPHEN 5; 325 MG/1; MG/1
1 TABLET ORAL EVERY 6 HOURS PRN
Status: DISCONTINUED | OUTPATIENT
Start: 2022-01-30 | End: 2022-01-31 | Stop reason: HOSPADM

## 2022-01-30 RX ADMIN — PANTOPRAZOLE SODIUM 40 MG: 40 TABLET, DELAYED RELEASE ORAL at 08:01

## 2022-01-30 RX ADMIN — OLANZAPINE 5 MG: 5 TABLET, FILM COATED ORAL at 08:01

## 2022-01-30 RX ADMIN — VENLAFAXINE HYDROCHLORIDE 225 MG: 150 CAPSULE, EXTENDED RELEASE ORAL at 08:01

## 2022-01-30 RX ADMIN — HYDROMORPHONE HYDROCHLORIDE 0.2 MG: 1 INJECTION, SOLUTION INTRAMUSCULAR; INTRAVENOUS; SUBCUTANEOUS at 03:01

## 2022-01-30 RX ADMIN — DEXAMETHASONE SODIUM PHOSPHATE 8 MG: 4 INJECTION, SOLUTION INTRAMUSCULAR; INTRAVENOUS at 02:01

## 2022-01-30 RX ADMIN — ACETAMINOPHEN 1000 MG: 10 INJECTION, SOLUTION INTRAVENOUS at 02:01

## 2022-01-30 RX ADMIN — SUGAMMADEX 200 MG: 100 INJECTION, SOLUTION INTRAVENOUS at 03:01

## 2022-01-30 RX ADMIN — CEFOXITIN SODIUM 2 G: 2 INJECTION, SOLUTION INTRAVENOUS at 02:01

## 2022-01-30 RX ADMIN — OXYCODONE HYDROCHLORIDE 5 MG: 5 TABLET ORAL at 04:01

## 2022-01-30 RX ADMIN — ONDANSETRON 4 MG: 2 INJECTION INTRAMUSCULAR; INTRAVENOUS at 02:01

## 2022-01-30 RX ADMIN — LIDOCAINE HYDROCHLORIDE 50 MG: 10 INJECTION, SOLUTION EPIDURAL; INFILTRATION; INTRACAUDAL; PERINEURAL at 02:01

## 2022-01-30 RX ADMIN — HYDROMORPHONE HYDROCHLORIDE 0.2 MG: 1 INJECTION, SOLUTION INTRAMUSCULAR; INTRAVENOUS; SUBCUTANEOUS at 04:01

## 2022-01-30 RX ADMIN — FENTANYL CITRATE 50 MCG: 50 INJECTION INTRAMUSCULAR; INTRAVENOUS at 02:01

## 2022-01-30 RX ADMIN — SODIUM CHLORIDE, SODIUM LACTATE, POTASSIUM CHLORIDE, AND CALCIUM CHLORIDE: .6; .31; .03; .02 INJECTION, SOLUTION INTRAVENOUS at 02:01

## 2022-01-30 RX ADMIN — SUCCINYLCHOLINE CHLORIDE 120 MG: 20 INJECTION, SOLUTION INTRAMUSCULAR; INTRAVENOUS at 02:01

## 2022-01-30 RX ADMIN — KETAMINE HYDROCHLORIDE 15 MG: 50 INJECTION INTRAMUSCULAR; INTRAVENOUS at 02:01

## 2022-01-30 RX ADMIN — ROCURONIUM BROMIDE 5 MG: 10 INJECTION, SOLUTION INTRAVENOUS at 02:01

## 2022-01-30 RX ADMIN — ONDANSETRON 4 MG: 2 INJECTION INTRAMUSCULAR; INTRAVENOUS at 09:01

## 2022-01-30 RX ADMIN — FAMOTIDINE 20 MG: 10 INJECTION, SOLUTION INTRAVENOUS at 02:01

## 2022-01-30 RX ADMIN — ROCURONIUM BROMIDE 30 MG: 10 INJECTION, SOLUTION INTRAVENOUS at 02:01

## 2022-01-30 RX ADMIN — PIPERACILLIN SODIUM AND TAZOBACTAM SODIUM 3.38 G: 3; .375 INJECTION, POWDER, LYOPHILIZED, FOR SOLUTION INTRAVENOUS at 05:01

## 2022-01-30 RX ADMIN — ONDANSETRON 4 MG: 2 INJECTION INTRAMUSCULAR; INTRAVENOUS at 08:01

## 2022-01-30 RX ADMIN — SODIUM CHLORIDE, SODIUM LACTATE, POTASSIUM CHLORIDE, AND CALCIUM CHLORIDE: .6; .31; .03; .02 INJECTION, SOLUTION INTRAVENOUS at 05:01

## 2022-01-30 RX ADMIN — HYDROMORPHONE HYDROCHLORIDE 0.5 MG: 1 INJECTION, SOLUTION INTRAMUSCULAR; INTRAVENOUS; SUBCUTANEOUS at 09:01

## 2022-01-30 RX ADMIN — PROPOFOL 150 MG: 10 INJECTION, EMULSION INTRAVENOUS at 02:01

## 2022-01-30 RX ADMIN — ONDANSETRON 4 MG: 2 INJECTION INTRAMUSCULAR; INTRAVENOUS at 03:01

## 2022-01-30 RX ADMIN — MIDAZOLAM HYDROCHLORIDE 2 MG: 1 INJECTION, SOLUTION INTRAMUSCULAR; INTRAVENOUS at 02:01

## 2022-01-30 RX ADMIN — HYDROCODONE BITARTRATE AND ACETAMINOPHEN 1 TABLET: 5; 325 TABLET ORAL at 08:01

## 2022-01-30 NOTE — ANESTHESIA PREPROCEDURE EVALUATION
01/30/2022  Jojo Boateng is a 36 y.o., female.      Patient Active Problem List   Diagnosis    Acute lateral meniscal injury of right knee    Internal derangement of right knee    Chondromalacia of right knee    Knee internal derangement    Right knee pain    Impacted gallstone of cystic duct    Urinary tract infection without hematuria       Past Surgical History:   Procedure Laterality Date    KNEE ARTHROSCOPY Right         Tobacco Use:  The patient  reports that she quit smoking about 8 years ago. She has never used smokeless tobacco.     No results found for this or any previous visit.     Imaging Results          US Abdomen Limited (Final result)  Result time 01/30/22 08:59:22    Final result by Jonatan Zayas MD (01/30/22 08:59:22)                 Narrative:    Abdominal ultrasound Limited    CLINICAL DATA: Abdominal pain    FINDINGS: Sonographic assessment targeted to the right upper quadrant was performed.    Exam is limited by body habitus.    The pancreas, aorta, and inferior vena cava are poorly visualized.    The liver has a normal appearance. Hepatopedal flow is noted within the portal vein. There is a questionable small gallstone at the gallbladder neck. There is no evidence of cholecystitis or obstruction. Right kidney is normal. There is no right upper quadrant free fluid.    IMPRESSION:  1. Questionable gallstone at the gallbladder neck. No evidence of cholecystitis or obstruction.  2. No other significant findings. Exam is limited by body habitus.    Electronically signed by:  Jonatan Zayas MD  1/30/2022 8:59 AM CST Workstation: [id number]                               Lab Results   Component Value Date    WBC 5.68 01/30/2022    HGB 11.4 (L) 01/30/2022    HCT 35.0 (L) 01/30/2022    MCV 79 (L) 01/30/2022     01/30/2022     BMP  Lab Results   Component Value Date      01/30/2022    K 3.9 01/30/2022     01/30/2022    CO2 23 01/30/2022    BUN 16 01/30/2022    CREATININE 0.8 01/30/2022    CALCIUM 8.6 (L) 01/30/2022    ANIONGAP 9 01/30/2022    GLU 92 01/30/2022     07/26/2019     11/12/2017       No results found for this or any previous visit.            Anesthesia Evaluation    I have reviewed the Patient Summary Reports.    I have reviewed the Nursing Notes. I have reviewed the NPO Status.   I have reviewed the Medications.     Review of Systems  Anesthesia Hx:  No problems with previous Anesthesia  Denies Family Hx of Anesthesia complications.   Denies Personal Hx of Anesthesia complications.   Social:  Former Smoker, Alcohol Use    Cardiovascular:   Exercise tolerance: good Hypertension > 4 METS   Pulmonary:  Pulmonary Normal    Renal/:  Renal/ Normal     Hepatic/GI:   Denies any active n/v at today    Musculoskeletal:   Arthritis     Neurological:  Neurology Normal    Endocrine:  Endocrine Normal    Psych:   Psychiatric History (BPD)          Physical Exam  General:  Well nourished, Obesity    Airway/Jaw/Neck:  Airway Findings: Mouth Opening: Normal Tongue: Normal  Mallampati: III  TM Distance: Normal, at least 6 cm  Jaw/Neck Findings:  Neck ROM: Normal ROM  Neck Findings:  Girth Increased     Eyes/Ears/Nose:  EYES/EARS/NOSE FINDINGS: Normal   Dental:  Dental Findings: In tact   Chest/Lungs:  Chest/Lungs Findings: Clear to auscultation, Normal Respiratory Rate     Heart/Vascular:  Heart Findings: Rate: Normal  Rhythm: Regular Rhythm  Sounds: Normal        Mental Status:  Mental Status Findings:  Cooperative, Alert and Oriented         Anesthesia Plan  Type of Anesthesia, risks & benefits discussed:  Anesthesia Type:  general    Patient's Preference:   Plan Factors:          Intra-op Monitoring Plan: standard ASA monitors  Intra-op Monitoring Plan Comments:   Post Op Pain Control Plan: multimodal analgesia, IV/PO Opioids PRN and per primary  service following discharge from PACU  Post Op Pain Control Plan Comments:     Induction:   IV  Beta Blocker:  Patient is not currently on a Beta-Blocker (No further documentation required).       Informed Consent: Patient understands risks and agrees with Anesthesia plan.  Questions answered. Anesthesia consent signed with patient.  ASA Score: 3  emergent   Day of Surgery Review of History & Physical:        Anesthesia Plan Notes: GETA, RSI  Ofirmev 1000mg  Ketamine 40mg IV  Pepcid 20mg  Zofran n/v ppx   Sugammadex    Sleep apnea precautions        Ready For Surgery From Anesthesia Perspective.

## 2022-01-30 NOTE — TRANSFER OF CARE
"Anesthesia Transfer of Care Note    Patient: Jojo Boateng    Procedure(s) Performed: Procedure(s) (LRB):  CHOLECYSTECTOMY, LAPAROSCOPIC (N/A)    Patient location: PACU    Anesthesia Type: general    Transport from OR: Transported from OR on room air with adequate spontaneous ventilation    Post pain: adequate analgesia    Post assessment: no apparent anesthetic complications    Post vital signs: stable    Level of consciousness: awake    Nausea/Vomiting: no nausea/vomiting    Complications: none    Transfer of care protocol was followed      Last vitals:   Visit Vitals  BP (!) 149/65   Pulse 76   Temp 37 °C (98.6 °F) (Oral)   Resp 18   Ht 5' 6" (1.676 m)   Wt (!) 147.4 kg (325 lb)   SpO2 98%   BMI 52.46 kg/m²     "

## 2022-01-30 NOTE — ANESTHESIA PROCEDURE NOTES
Intubation    Date/Time: 1/30/2022 2:20 PM  Performed by: Fuad Dobbins CRNA  Authorized by: Wing Valencia MD     Intubation:     Induction:  Intravenous    Intubated:  Postinduction    Mask Ventilation:  N/a    Attempts:  1    Attempted By:  CRNA    Method of Intubation:  Video laryngoscopy    Blade:  Pate 4    Laryngeal View Grade: Grade I - full view of cords      Difficult Airway Encountered?: No      Complications:  None    Airway Device:  Oral endotracheal tube    Airway Device Size:  7.5    Style/Cuff Inflation:  Cuffed    Inflation Amount (mL):  8    Tube secured:  21    Secured at:  The lips    Placement Verified By:  Capnometry    Complicating Factors:  None    Findings Post-Intubation:  BS equal bilateral

## 2022-01-30 NOTE — SUBJECTIVE & OBJECTIVE
Past Medical History:   Diagnosis Date    Anxiety     Bipolar disorder     Depression     Hallucination     History of psychiatric hospitalization     Hx of psychiatric care     Hypertension     Arcelia     Psychiatric problem     Psychosis     Sleep difficulties     Suicide attempt     multiple attempts, klonopin/benadryl overdose last time; always by overdose    Therapy        Past Surgical History:   Procedure Laterality Date    KNEE ARTHROSCOPY Right        Review of patient's allergies indicates:   Allergen Reactions    Latex     Ultram [tramadol] Hives       No current facility-administered medications on file prior to encounter.     Current Outpatient Medications on File Prior to Encounter   Medication Sig    lisinopriL (PRINIVIL,ZESTRIL) 20 MG tablet Take 20 mg by mouth once daily.    OLANZapine (ZYPREXA) 5 MG tablet Take 1 tablet (5 mg total) by mouth every evening.    pantoprazole (PROTONIX) 20 MG tablet Take 20 mg by mouth once daily.    venlafaxine (EFFEXOR XR) 150 MG Cp24 Take 1 capsule (150 mg total) by mouth once daily. (Patient taking differently: Take 225 mg by mouth once daily.)    [DISCONTINUED] amLODIPine (NORVASC) 5 MG tablet Take 5 mg by mouth.    [DISCONTINUED] lisinopril-hydrochlorothiazide (PRINZIDE,ZESTORETIC) 20-25 mg Tab      Family History     Problem Relation (Age of Onset)    Bipolar disorder Mother        Tobacco Use    Smoking status: Former Smoker     Quit date: 2013     Years since quittin.6    Smokeless tobacco: Never Used    Tobacco comment: stopped in    Substance and Sexual Activity    Alcohol use: Yes     Comment: occassionally    Drug use: No    Sexual activity: Yes     Partners: Female     Review of Systems   Constitutional: Negative for chills and fever.   HENT: Negative for sore throat.    Eyes: Negative for redness and itching.   Respiratory: Negative for chest tightness and shortness of breath.    Cardiovascular: Negative for chest  pain and palpitations.   Gastrointestinal: Positive for abdominal pain, nausea and vomiting. Negative for blood in stool.   Genitourinary: Negative for dysuria.   Musculoskeletal: Negative for gait problem and joint swelling.   Neurological: Negative for tremors and weakness.   Psychiatric/Behavioral: Negative for behavioral problems and sleep disturbance.   All other systems reviewed and are negative.    Objective:     Vital Signs (Most Recent):  Temp: 98.6 °F (37 °C) (01/30/22 1635)  Pulse: 84 (01/30/22 1635)  Resp: 18 (01/30/22 1635)  BP: 104/61 (01/30/22 1635)  SpO2: 95 % (01/30/22 1635) Vital Signs (24h Range):  Temp:  [97.2 °F (36.2 °C)-98.6 °F (37 °C)] 98.6 °F (37 °C)  Pulse:  [] 84  Resp:  [18-23] 18  SpO2:  [95 %-100 %] 95 %  BP: (104-169)/(55-85) 104/61     Weight: (!) 147.4 kg (325 lb)  Body mass index is 52.46 kg/m².    Physical Exam  Vitals and nursing note reviewed.   Constitutional:       Appearance: She is well-developed and well-nourished.      Comments: Mild distress due to nausea   HENT:      Head: Atraumatic.      Mouth/Throat:      Mouth: Oropharynx is clear and moist.   Neck:      Vascular: No JVD.   Cardiovascular:      Rate and Rhythm: Normal rate and regular rhythm.      Heart sounds: Normal heart sounds. No murmur heard.  No gallop.    Pulmonary:      Effort: No respiratory distress.      Breath sounds: Normal breath sounds. No wheezing.   Abdominal:      General: Bowel sounds are normal. There is no distension.      Palpations: Abdomen is soft.      Tenderness: There is no guarding or rebound.      Comments: Appropriately tender, laparoscopic sites look unremarkable, BS+   Musculoskeletal:         General: No edema.      Cervical back: Neck supple.   Lymphadenopathy:      Cervical: No cervical adenopathy.   Skin:     General: Skin is warm.      Capillary Refill: Capillary refill takes less than 2 seconds.      Findings: No rash.   Neurological:      Mental Status: She is alert and  oriented to person, place, and time.      Cranial Nerves: No cranial nerve deficit.   Psychiatric:         Behavior: Behavior normal.             Significant Labs: All pertinent labs within the past 24 hours have been reviewed.    Significant Imaging: I have reviewed all pertinent imaging results/findings within the past 24 hours.

## 2022-01-30 NOTE — CONSULTS
GENERAL SURGERY  INPATIENT CONSULT    REASON FOR CONSULT/ADMISSION:  Impacted gallstone    HPI: Jojo Boateng is a 36 y.o. female with history of bipolar disorder, depression hypertension who presented to the emergency room with a 1-2 month history of right-sided upper abdominal pain which has become more severe and localized.  This letter to the emergency room where she had 8/10 pain.  Labs were grossly normal including LFTs however ultrasound showed evidence of an impacted gallstone at the neck of the gallbladder.  No specific cholecystic fluid or gallbladder wall thickening was seen.  General surgery was consulted for evaluation.  Patient has ongoing pain.  Worsened whenever she eats.  No specific foods are found to be worse than others.  She has nausea but no vomiting.  No fevers or chills.  No other previous abdominal surgeries.    I have reviewed the patient's chart including prior progress notes, procedures and testing.     ROS:   Review of Systems   Constitutional: Positive for activity change. Negative for chills and fever.   Respiratory: Negative for apnea, chest tightness and shortness of breath.    Cardiovascular: Negative for chest pain, palpitations and leg swelling.   Gastrointestinal: Positive for nausea. Negative for abdominal distention and vomiting.   Genitourinary: Negative for difficulty urinating, dysuria and hematuria.   Skin: Negative for color change and wound.   Neurological: Negative for dizziness and light-headedness.       PROBLEM LIST:  Patient Active Problem List   Diagnosis    Acute lateral meniscal injury of right knee    Internal derangement of right knee    Chondromalacia of right knee    Knee internal derangement    Right knee pain    Calculus of gallbladder without cholecystitis without obstruction    Urinary tract infection without hematuria         HISTORY  Past Medical History:   Diagnosis Date    Anxiety     Bipolar disorder     Depression     Hallucination      History of psychiatric hospitalization     Hx of psychiatric care     Hypertension     Arcelia     Psychiatric problem     Psychosis     Sleep difficulties     Suicide attempt     multiple attempts, klonopin/benadryl overdose last time; always by overdose    Therapy        Past Surgical History:   Procedure Laterality Date    KNEE ARTHROSCOPY Right        Social History     Tobacco Use    Smoking status: Former Smoker     Quit date: 2013     Years since quittin.6    Smokeless tobacco: Never Used    Tobacco comment: stopped in    Substance Use Topics    Alcohol use: Yes     Comment: occassionally    Drug use: No       Family History   Problem Relation Age of Onset    Bipolar disorder Mother          MEDS:  No current facility-administered medications on file prior to encounter.     Current Outpatient Medications on File Prior to Encounter   Medication Sig Dispense Refill    lisinopriL (PRINIVIL,ZESTRIL) 20 MG tablet Take 20 mg by mouth once daily.      OLANZapine (ZYPREXA) 5 MG tablet Take 1 tablet (5 mg total) by mouth every evening. 30 tablet 2    pantoprazole (PROTONIX) 20 MG tablet Take 20 mg by mouth once daily.      venlafaxine (EFFEXOR XR) 150 MG Cp24 Take 1 capsule (150 mg total) by mouth once daily. 30 capsule 2    amLODIPine (NORVASC) 5 MG tablet Take 5 mg by mouth.      lisinopril-hydrochlorothiazide (PRINZIDE,ZESTORETIC) 20-25 mg Tab          ALLERGIES:  Review of patient's allergies indicates:   Allergen Reactions    Latex     Ultram [tramadol] Hives         VITALS:  Temp:  [98.6 °F (37 °C)] 98.6 °F (37 °C)  Pulse:  [] 111  Resp:  [18] 18  SpO2:  [99 %-100 %] 100 %  BP: (117-165)/(55-74) 165/74    No intake/output data recorded.      PHYSICAL EXAM:  Physical Exam  Vitals reviewed.   Constitutional:       Appearance: Normal appearance. She is well-developed. She is obese.   HENT:      Head: Normocephalic and atraumatic.      Nose: Nose normal.   Eyes:       General: No scleral icterus.     Conjunctiva/sclera: Conjunctivae normal.   Neck:      Trachea: No tracheal tenderness or tracheal deviation.   Cardiovascular:      Rate and Rhythm: Normal rate and regular rhythm.      Pulses: Normal pulses.   Pulmonary:      Effort: Pulmonary effort is normal. No accessory muscle usage or respiratory distress.      Breath sounds: Normal breath sounds.   Abdominal:      General: There is no distension.      Palpations: Abdomen is soft.      Tenderness: There is abdominal tenderness (Right upper quadrant). There is no guarding or rebound.      Hernia: No hernia is present.   Musculoskeletal:         General: No swelling or tenderness. Normal range of motion.      Cervical back: Normal range of motion and neck supple. No rigidity.   Skin:     General: Skin is warm and dry.      Coloration: Skin is not jaundiced.      Findings: No erythema.   Neurological:      General: No focal deficit present.      Mental Status: She is alert and oriented to person, place, and time.      Motor: No weakness or abnormal muscle tone.   Psychiatric:         Mood and Affect: Mood normal.         Behavior: Behavior normal.         Thought Content: Thought content normal.         Judgment: Judgment normal.           LABS:  Lab Results   Component Value Date    WBC 5.68 01/30/2022    RBC 4.44 01/30/2022    HGB 11.4 (L) 01/30/2022    HCT 35.0 (L) 01/30/2022     01/30/2022     Lab Results   Component Value Date    GLU 92 01/30/2022     01/30/2022    K 3.9 01/30/2022     01/30/2022    CO2 23 01/30/2022    BUN 16 01/30/2022    CREATININE 0.8 01/30/2022    CALCIUM 8.6 (L) 01/30/2022     Lab Results   Component Value Date    ALT 20 01/30/2022    AST 17 01/30/2022    ALKPHOS 64 01/30/2022    BILITOT 0.6 01/30/2022     No results found for: MG, PHOS    STUDIES:  Images and reports were personally reviewed.    Abd US  Exam is limited by body habitus.     The pancreas, aorta, and inferior vena cava  are poorly visualized.     The liver has a normal appearance. Hepatopedal flow is noted within the portal vein. There is a questionable small gallstone at the gallbladder neck. There is no evidence of cholecystitis or obstruction. Right kidney is normal. There is no right upper quadrant free fluid.     IMPRESSION:  1. Questionable gallstone at the gallbladder neck. No evidence of cholecystitis or obstruction.  2. No other significant findings. Exam is limited by body habitus      ASSESSMENT & PLAN:  36 y.o. female with suspected gallstone impaction at the cystic duct  - though there is no pericholecystic fluid or gallbladder wall thickening clinically the patient has cholecystitis with ongoing constant pain in the right upper quadrant with incomplete resolution with pain medication  - imaging consistent with gallstones likely with impaction at the cystic duct  - recommend cholecystectomy to remove gallstone and impaction and alleviate symptoms  - discussed with the patient who is in agreement with the above plan  - risks and benefits of cholecystectomy were reviewed.  Specifically we discussed the risk of pain, bleeding, scarring, infection, bile leak, injury to common bile duct, injury to surrounding organs, retained stone, pancreatitis, bile leak, failure to relieve symptoms, etc.  we also discussed the need for potential conversion to open procedure or need to perform intraoperative cholangiogram.  The patient expressed understanding of these risks and agreed proceed with surgical intervention.  - to OR today for laparoscopic cholecystectomy  - will allow diet as tolerated postoperatively with plans for possible discharge home this evening or tomorrow morning if pain and nausea are improved

## 2022-01-30 NOTE — H&P
[facility]  History & Physical    Patient Name: Jojo Boateng  MRN: [id number]  Patient Class: OP- Observation  Admission Date: 1/30/2022  Attending Physician: Sean Christine MD   Primary Care Provider: RICCI Sweeney         Patient information was obtained from patient, past medical records and ER records.     Subjective:     Principal Problem:Impacted gallstone of cystic duct    Chief Complaint:   Chief Complaint   Patient presents with    Abdominal Pain     Abdominal pain in R upper; also flank pain; this all has been going on for 1 month per pt. Rates pain 8/10; complain of N/V        HPI: 36-year-old morbidly obese lady with prior history of hypertension, bipolar presented with 1 month history of postprandial abdominal pain, nausea.  Symptoms progressively got worse and today she decided to come to ED.  pain is more located in right upper quadrant associated with nausea and vomiting.  She denies any fever, chills, headache, dizziness, chest pain, palpitations, bladder or bowel symptoms.     In ED she was found to have large stone impacted in cystic duct.  General surgery took her to operating room and she underwent exploratory cholecystectomy.  Postoperatively Hospital Medicine was consulted for admission.       Past Medical History:   Diagnosis Date    Anxiety     Bipolar disorder     Depression     Hallucination     History of psychiatric hospitalization     Hx of psychiatric care     Hypertension     Arcelia     Psychiatric problem     Psychosis     Sleep difficulties     Suicide attempt     multiple attempts, klonopin/benadryl overdose last time; always by overdose    Therapy        Past Surgical History:   Procedure Laterality Date    KNEE ARTHROSCOPY Right        Review of patient's allergies indicates:   Allergen Reactions    Latex     Ultram [tramadol] Hives       No current facility-administered medications on file prior to encounter.      Current Outpatient Medications on File Prior to Encounter   Medication Sig    lisinopriL (PRINIVIL,ZESTRIL) 20 MG tablet Take 20 mg by mouth once daily.    OLANZapine (ZYPREXA) 5 MG tablet Take 1 tablet (5 mg total) by mouth every evening.    pantoprazole (PROTONIX) 20 MG tablet Take 20 mg by mouth once daily.    venlafaxine (EFFEXOR XR) 150 MG Cp24 Take 1 capsule (150 mg total) by mouth once daily. (Patient taking differently: Take 225 mg by mouth once daily.)    [DISCONTINUED] amLODIPine (NORVASC) 5 MG tablet Take 5 mg by mouth.    [DISCONTINUED] lisinopril-hydrochlorothiazide (PRINZIDE,ZESTORETIC) 20-25 mg Tab      Family History     Problem Relation (Age of Onset)    Bipolar disorder Mother        Tobacco Use    Smoking status: Former Smoker     Quit date: 2013     Years since quittin.6    Smokeless tobacco: Never Used    Tobacco comment: stopped in    Substance and Sexual Activity    Alcohol use: Yes     Comment: occassionally    Drug use: No    Sexual activity: Yes     Partners: Female     Review of Systems   Constitutional: Negative for chills and fever.   HENT: Negative for sore throat.    Eyes: Negative for redness and itching.   Respiratory: Negative for chest tightness and shortness of breath.    Cardiovascular: Negative for chest pain and palpitations.   Gastrointestinal: Positive for abdominal pain, nausea and vomiting. Negative for blood in stool.   Genitourinary: Negative for dysuria.   Musculoskeletal: Negative for gait problem and joint swelling.   Neurological: Negative for tremors and weakness.   Psychiatric/Behavioral: Negative for behavioral problems and sleep disturbance.   All other systems reviewed and are negative.    Objective:     Vital Signs (Most Recent):  Temp: 98.6 °F (37 °C) (22 1635)  Pulse: 84 (22 1635)  Resp: 18 (22 1635)  BP: 104/61 (22 1635)  SpO2: 95 % (22 1635) Vital Signs (24h Range):  Temp:  [97.2 °F (36.2 °C)-98.6 °F  (37 °C)] 98.6 °F (37 °C)  Pulse:  [] 84  Resp:  [18-23] 18  SpO2:  [95 %-100 %] 95 %  BP: (104-169)/(55-85) 104/61     Weight: (!) 147.4 kg (325 lb)  Body mass index is 52.46 kg/m².    Physical Exam  Vitals and nursing note reviewed.   Constitutional:       Appearance: She is well-developed and well-nourished.      Comments: Mild distress due to nausea   HENT:      Head: Atraumatic.      Mouth/Throat:      Mouth: Oropharynx is clear and moist.   Neck:      Vascular: No JVD.   Cardiovascular:      Rate and Rhythm: Normal rate and regular rhythm.      Heart sounds: Normal heart sounds. No murmur heard.  No gallop.    Pulmonary:      Effort: No respiratory distress.      Breath sounds: Normal breath sounds. No wheezing.   Abdominal:      General: Bowel sounds are normal. There is no distension.      Palpations: Abdomen is soft.      Tenderness: There is no guarding or rebound.      Comments: Appropriately tender, laparoscopic sites look unremarkable, BS+   Musculoskeletal:         General: No edema.      Cervical back: Neck supple.   Lymphadenopathy:      Cervical: No cervical adenopathy.   Skin:     General: Skin is warm.      Capillary Refill: Capillary refill takes less than 2 seconds.      Findings: No rash.   Neurological:      Mental Status: She is alert and oriented to person, place, and time.      Cranial Nerves: No cranial nerve deficit.   Psychiatric:         Behavior: Behavior normal.             Significant Labs: All pertinent labs within the past 24 hours have been reviewed.    Significant Imaging: I have reviewed all pertinent imaging results/findings within the past 24 hours.    Assessment/Plan:     36-year-old morbidly obese lady presented with 1 month of postprandial abdominal pain, nausea found to have impacted gallstone of cystic duct.  Status post laparoscopic cholecystectomy.     Assessment:  Active Hospital Problems    Diagnosis  POA    *Impacted gallstone of cystic duct [K80.20]  Yes     Abdominal pain [R10.9]  Unknown    Essential hypertension [I10]  Yes      Resolved Hospital Problems   No resolved problems to display.       Plan:  Admit to med surge-observation  Status post laparoscopic cholecystectomy  Perioperative Zosyn  IV fluids, p.r.n. antiemetics, p.r.n. analgesia  Resume essential home medication  Clear liquid diet-further Diet advancement as per general surgeon  SCDs  Out of bed to chair, early ambulation    VTE Risk Mitigation (From admission, onward)         Ordered     Reason for No Pharmacological VTE Prophylaxis  Once        Question:  Reasons:  Answer:  Risk of Bleeding    01/30/22 1709     IP VTE HIGH RISK PATIENT  Once         01/30/22 1709     Place sequential compression device  Until discontinued         01/30/22 1709                   Sean Christine MD  Department of Hospital Medicine   [facility]

## 2022-01-30 NOTE — ED PROVIDER NOTES
Encounter Date: 2022       History     Chief Complaint   Patient presents with    Abdominal Pain     Abdominal pain in R upper; also flank pain; this all has been going on for 1 month per pt. Rates pain 8/10; complain of N/V     28-year-old female with history of depression, bipolar disorder, presents emergency room with complaints of having right-sided abdominal pain and flank pain of 2 months duration.  Patient states that the pain is becoming increasingly worse whenever she eats.  There is no specific foods that she has been noting that precipitates the discomfort.  She denies any vomiting but has had nausea.  No fever.  She denies any dysuria or hematuria.  No complaints of any chest pain or shortness of breath.  She states she has had normal bowel movements and not bloody or melanotic.        Review of patient's allergies indicates:   Allergen Reactions    Latex     Ultram [tramadol] Hives     Past Medical History:   Diagnosis Date    Anxiety     Bipolar disorder     Depression     Hallucination     History of psychiatric hospitalization     Hx of psychiatric care     Hypertension     Arcelia     Psychiatric problem     Psychosis     Sleep difficulties     Suicide attempt     multiple attempts, klonopin/benadryl overdose last time; always by overdose    Therapy      Past Surgical History:   Procedure Laterality Date    KNEE ARTHROSCOPY Right      Family History   Problem Relation Age of Onset    Bipolar disorder Mother      Social History     Tobacco Use    Smoking status: Former Smoker     Quit date: 2013     Years since quittin.6    Smokeless tobacco: Never Used    Tobacco comment: stopped in    Substance Use Topics    Alcohol use: Yes     Comment: occassionally    Drug use: No     Review of Systems   Constitutional: Positive for appetite change. Negative for fever.   HENT: Negative.  Negative for sore throat.    Respiratory: Negative.  Negative for shortness of breath.     Cardiovascular: Negative.  Negative for chest pain.   Gastrointestinal: Positive for abdominal pain and nausea. Negative for diarrhea and vomiting.   Genitourinary: Positive for flank pain. Negative for difficulty urinating and dysuria.   Musculoskeletal: Negative for back pain.   Skin: Negative.  Negative for rash.   Neurological: Negative for weakness.   Hematological: Does not bruise/bleed easily.   All other systems reviewed and are negative.      Physical Exam     Initial Vitals [01/30/22 0745]   BP Pulse Resp Temp SpO2   (!) 164/70 94 18 98.6 °F (37 °C) 100 %      MAP       --         Physical Exam    Vitals reviewed.  Constitutional: She appears well-developed and well-nourished. She is not diaphoretic. No distress.   HENT:   Head: Normocephalic and atraumatic.   Nose: Nose normal.   Mouth/Throat: Oropharynx is clear and moist. No oropharyngeal exudate.   Eyes: Conjunctivae are normal. Pupils are equal, round, and reactive to light.   Neck: Neck supple. No JVD present.   Normal range of motion.  Cardiovascular: Normal rate, regular rhythm, normal heart sounds and intact distal pulses. Exam reveals no gallop and no friction rub.    No murmur heard.  Pulmonary/Chest: Breath sounds normal. No respiratory distress. She has no wheezes. She has no rhonchi. She has no rales. She exhibits no tenderness.   Abdominal: Abdomen is soft. Bowel sounds are normal. She exhibits no distension and no mass. There is abdominal tenderness.       Right-sided tenderness including right upper quadrant pain with palpation, no rebound. There is no rebound and no guarding.   Musculoskeletal:         General: No tenderness or edema. Normal range of motion.      Cervical back: Normal range of motion and neck supple.     Lymphadenopathy:     She has no cervical adenopathy.   Neurological: She is alert and oriented to person, place, and time. She has normal strength. GCS score is 15. GCS eye subscore is 4. GCS verbal subscore is 5. GCS  motor subscore is 6.   Skin: Skin is warm and dry. Capillary refill takes less than 2 seconds. No rash noted. No erythema. No pallor.         ED Course   Procedures  Labs Reviewed   CBC W/ AUTO DIFFERENTIAL - Abnormal; Notable for the following components:       Result Value    Hemoglobin 11.4 (*)     Hematocrit 35.0 (*)     MCV 79 (*)     MCH 25.7 (*)     All other components within normal limits   COMPREHENSIVE METABOLIC PANEL - Abnormal; Notable for the following components:    Calcium 8.6 (*)     All other components within normal limits   URINALYSIS, REFLEX TO URINE CULTURE - Abnormal; Notable for the following components:    Protein, UA Trace (*)     Urobilinogen, UA 4.0-6.0 (*)     All other components within normal limits    Narrative:     Specimen Source->Urine   LIPASE   SARS-COV-2 RNA AMPLIFICATION, QUAL   POCT URINE PREGNANCY          Imaging Results          US Abdomen Limited (Final result)  Result time 01/30/22 08:59:22    Final result by Jonatan Zayas MD (01/30/22 08:59:22)                 Narrative:    Abdominal ultrasound Limited    CLINICAL DATA: Abdominal pain    FINDINGS: Sonographic assessment targeted to the right upper quadrant was performed.    Exam is limited by body habitus.    The pancreas, aorta, and inferior vena cava are poorly visualized.    The liver has a normal appearance. Hepatopedal flow is noted within the portal vein. There is a questionable small gallstone at the gallbladder neck. There is no evidence of cholecystitis or obstruction. Right kidney is normal. There is no right upper quadrant free fluid.    IMPRESSION:  1. Questionable gallstone at the gallbladder neck. No evidence of cholecystitis or obstruction.  2. No other significant findings. Exam is limited by body habitus.    Electronically signed by:  Jonatan Zayas MD  1/30/2022 8:59 AM [facility] Workstation: [id number]                               Medications   ondansetron injection 4 mg (4 mg Intravenous Given  1/30/22 0806)   HYDROmorphone injection 0.5 mg (0.5 mg Intravenous Given 1/30/22 0921)   ondansetron injection 4 mg (4 mg Intravenous Given 1/30/22 0921)                Attending Attestation:             Attending ED Notes:   This 28-year-old female presented with complaints of right-sided abdominal pain, was found to have had a probable stone in the neck of the gallbladder.  At this gala of Zofran IV. e she has no fever or elevated white count and her LFTs also normal.  The patient does complaining of significant pain in for that she was given 0.5 mg of Dilaudid IV and 4 mg Zofran.  General surgery was consulted and after school field responded and stated if the patient was too uncomfortable he would suggest admission if however she was not having much pain he could work her up and have surgery as an outpatient.  Because of the fact that the patient states that she is too uncomfortable to go home hospital Medicine will be consulted for admission with General surgery consultation.               Clinical Impression:   Final diagnoses:  [R10.9] Abdominal pain, unspecified abdominal location (Primary)  [K80.20] Calculus of gallbladder without cholecystitis without obstruction  [K80.50] Biliary colic          ED Disposition Condition    Observation               Ronal Santiago Jr., MD  01/30/22 0926       Ronal Santiago Jr., MD  01/30/22 0957

## 2022-01-30 NOTE — ANESTHESIA POSTPROCEDURE EVALUATION
Anesthesia Post Evaluation    Patient: Jojo Boateng    Procedure(s) Performed: Procedure(s) (LRB):  CHOLECYSTECTOMY, LAPAROSCOPIC (N/A)    Final Anesthesia Type: general      Patient location during evaluation: PACU  Patient participation: Yes- Able to Participate  Level of consciousness: awake and alert  Post-procedure vital signs: reviewed and stable  Pain management: adequate  Airway patency: patent  ANDREA mitigation strategies: Multimodal analgesia, Extubation while patient is awake and Extubation and recovery carried out in lateral, semiupright, or other nonsupine position  PONV status at discharge: No PONV  Anesthetic complications: no      Cardiovascular status: blood pressure returned to baseline  Respiratory status: unassisted  Hydration status: euvolemic  Follow-up not needed.          Vitals Value Taken Time   /61 01/30/22 1635   Temp 37 °C (98.6 °F) 01/30/22 1635   Pulse 84 01/30/22 1635   Resp 18 01/30/22 1635   SpO2 95 % 01/30/22 1635         Event Time   Out of Recovery 16:24:47         Pain/Luz Score: Pain Rating Prior to Med Admin: 5 (1/30/2022  4:16 PM)  Luz Score: 10 (1/30/2022  4:15 PM)

## 2022-01-30 NOTE — OP NOTE
DATE OF PROCEDURE: 01/30/2022    PREOPERATIVE DIAGNOSIS:  Impacted gallstone at cystic duct    POSTOPERATIVE DIAGNOSIS:  Same    PROCEDURE: Laparoscopic cholecystectomy    SURGEON: Munir Major M.D    ASSISTANT:  None    ANESTHESIA: General endotracheal    ESTIMATED BLOOD LOSS:  20 cc    SPECIMEN: Gallbladder    CONDITION: Stable    COMPLICATIONS: None    FINDINGS:   Gallbladder distended, needle aspiration performed  Critical view of safety achieved  No significant spillage of stones or bile    INDICATIONS: The patient is a 36 y.o. female who presented to the emergency room with ongoing right upper quadrant abdominal pain for 1 month which acutely worsened over the past week.  Pain was now 8/10.  Underwent ultrasound which showed concerns for impacted stone at the cystic duct. The patient was counseled on their surgical options and desired surgical intervention. The risks of the procedure were described to the patient including pain, infection, bleeding, scarring, wound complications, injury to local structures such as bile duct, liver or intestine, warranting more extensive surgery, retained common bile duct stone or need for further intervention. The patient demonstrated understanding of these risks and a consent form was obtained.    PROCEDURE IN DETAIL: PROCEDURE IN DETAIL: The patient was identified in the Preoperative Unit and taken back to the Operating Room and laid supine on the operating room table. IV antibiotics were administered and general anesthesia was induced without complication. The patient was then prepped and draped in the standard sterile fashion. A timeout was performed in accordance with hospital protocol.  A Veress needle was introduced into the abdominal cavity and insufflation was attached.  We had an appropriate initial pressures and we reinserted the Veress needle.  We again had high pressures therefore we inserted on the right upper abdomen.  For the 3rd time we had high  pressures.  At this point we elected to perform a straight Optiview access.  Local anesthetic was injected then a stab incision was sharply made just above the umbilicus. A 5 mm Optiview trocar was introduced into the peritoneal cavity under direct visualization.  We test insufflation and she pneumoperitoneum.  We examined our previous Veress insertion sites and no obvious injuries identified.  It appeared we had insufflated into the falciform ligament.   An 11 mm trocar was then placed in subxiphoid region under direct visualization after injecting local anesthetic.  Two additional 5 mm trocars were placed in the right mid and right lateral abdomen under direct visualization again after injecting local anesthetic. The gallbladder was identified and found to distended with mild inflammation.  It was difficult to grasp the gallbladder due to the distension therefore needle aspiration was performed which returned bilious fluid.  The gallbladder fundus was grasped and retracted into the right upper quadrant and the infundibulum retracted laterally. The peritoneum over the infundibulum and cystic structures was then gently stripped until the cystic duct and artery were identified and the critical view of safety was achieved.The cystic duct and artery were doubly clipped proximally and once distally and then divided. The gallbladder was then dissected off the gallbladder fossa using electrocautery. Once dissection was completed, the gallbladder was placed into an EndoCatch bag and removed through the subxiphoid port. The dissection field was inspected for hemostasis, bile leak and to confirmed clips were in place. Additional local anesthetic was injected around the port sites under direct visualization.  The subxiphoid fascial incision was closed with a 0 Vicryl suture. The abdomen was desufflated and ports removed. Additional local anesthetic was injected and all the skin incisions were closed using a 4-0 Monocryl  subcuticular stitch. Dermabond was then applied. The patient was awakened from general anesthesia without complication and returned to the Postoperative Recovery Unit in stable condition. At the end of the case, sponge, instrument and needle counts were correct on 2 occasions. I was present and scrubbed throughout the entirety of the case.

## 2022-01-30 NOTE — HPI
36-year-old morbidly obese lady with prior history of hypertension, bipolar presented with 1 month history of postprandial abdominal pain, nausea.  Symptoms progressively got worse and today she decided to come to ED.  pain is more located in right upper quadrant associated with nausea and vomiting.  She denies any fever, chills, headache, dizziness, chest pain, palpitations, bladder or bowel symptoms.     In ED she was found to have large stone impacted in cystic duct.  General surgery took her to operating room and she underwent exploratory cholecystectomy.  Postoperatively Hospital Medicine was consulted for admission.

## 2022-01-30 NOTE — NURSING
Pt arrived to the floor from surgery. Pt [id number]. Pt slightly nauseated but no other complaints. Vs stable.

## 2022-01-31 VITALS
WEIGHT: 293 LBS | OXYGEN SATURATION: 98 % | BODY MASS INDEX: 47.09 KG/M2 | HEIGHT: 66 IN | DIASTOLIC BLOOD PRESSURE: 68 MMHG | HEART RATE: 73 BPM | RESPIRATION RATE: 20 BRPM | SYSTOLIC BLOOD PRESSURE: 116 MMHG | TEMPERATURE: 98 F

## 2022-01-31 LAB
ALBUMIN SERPL BCP-MCNC: 3.9 G/DL (ref 3.5–5.2)
ALP SERPL-CCNC: 59 U/L (ref 55–135)
ALT SERPL W/O P-5'-P-CCNC: 28 U/L (ref 10–44)
ANION GAP SERPL CALC-SCNC: 8 MMOL/L (ref 8–16)
AST SERPL-CCNC: 35 U/L (ref 10–40)
BASOPHILS # BLD AUTO: 0 K/UL (ref 0–0.2)
BASOPHILS NFR BLD: 0 % (ref 0–1.9)
BILIRUB SERPL-MCNC: 0.8 MG/DL (ref 0.1–1)
BUN SERPL-MCNC: 11 MG/DL (ref 6–20)
CALCIUM SERPL-MCNC: 8.6 MG/DL (ref 8.7–10.5)
CHLORIDE SERPL-SCNC: 109 MMOL/L (ref 95–110)
CO2 SERPL-SCNC: 23 MMOL/L (ref 23–29)
CREAT SERPL-MCNC: 0.6 MG/DL (ref 0.5–1.4)
DIFFERENTIAL METHOD: ABNORMAL
EOSINOPHIL # BLD AUTO: 0 K/UL (ref 0–0.5)
EOSINOPHIL NFR BLD: 0 % (ref 0–8)
ERYTHROCYTE [DISTWIDTH] IN BLOOD BY AUTOMATED COUNT: 13.8 % (ref 11.5–14.5)
EST. GFR  (AFRICAN AMERICAN): >60 ML/MIN/1.73 M^2
EST. GFR  (NON AFRICAN AMERICAN): >60 ML/MIN/1.73 M^2
GLUCOSE SERPL-MCNC: 104 MG/DL (ref 70–110)
HCT VFR BLD AUTO: 37.9 % (ref 37–48.5)
HGB BLD-MCNC: 11.6 G/DL (ref 12–16)
IMM GRANULOCYTES # BLD AUTO: 0.03 K/UL (ref 0–0.04)
IMM GRANULOCYTES NFR BLD AUTO: 0.4 % (ref 0–0.5)
LYMPHOCYTES # BLD AUTO: 0.9 K/UL (ref 1–4.8)
LYMPHOCYTES NFR BLD: 12.7 % (ref 18–48)
MAGNESIUM SERPL-MCNC: 2 MG/DL (ref 1.6–2.6)
MCH RBC QN AUTO: 25.1 PG (ref 27–31)
MCHC RBC AUTO-ENTMCNC: 30.6 G/DL (ref 32–36)
MCV RBC AUTO: 82 FL (ref 82–98)
MONOCYTES # BLD AUTO: 0.4 K/UL (ref 0.3–1)
MONOCYTES NFR BLD: 5.7 % (ref 4–15)
NEUTROPHILS # BLD AUTO: 5.7 K/UL (ref 1.8–7.7)
NEUTROPHILS NFR BLD: 81.2 % (ref 38–73)
NRBC BLD-RTO: 0 /100 WBC
PHOSPHATE SERPL-MCNC: 3.1 MG/DL (ref 2.7–4.5)
PLATELET # BLD AUTO: 184 K/UL (ref 150–450)
PMV BLD AUTO: 11.2 FL (ref 9.2–12.9)
POTASSIUM SERPL-SCNC: 4.5 MMOL/L (ref 3.5–5.1)
PROT SERPL-MCNC: 6.7 G/DL (ref 6–8.4)
RBC # BLD AUTO: 4.62 M/UL (ref 4–5.4)
SODIUM SERPL-SCNC: 140 MMOL/L (ref 136–145)
WBC # BLD AUTO: 6.99 K/UL (ref 3.9–12.7)

## 2022-01-31 PROCEDURE — G0378 HOSPITAL OBSERVATION PER HR: HCPCS

## 2022-01-31 PROCEDURE — 80053 COMPREHEN METABOLIC PANEL: CPT | Performed by: HOSPITALIST

## 2022-01-31 PROCEDURE — 85025 COMPLETE CBC W/AUTO DIFF WBC: CPT | Performed by: HOSPITALIST

## 2022-01-31 PROCEDURE — 25000003 PHARM REV CODE 250: Performed by: HOSPITALIST

## 2022-01-31 PROCEDURE — 83735 ASSAY OF MAGNESIUM: CPT | Performed by: HOSPITALIST

## 2022-01-31 PROCEDURE — 63600175 PHARM REV CODE 636 W HCPCS: Performed by: HOSPITALIST

## 2022-01-31 PROCEDURE — 96376 TX/PRO/DX INJ SAME DRUG ADON: CPT

## 2022-01-31 PROCEDURE — 84100 ASSAY OF PHOSPHORUS: CPT | Performed by: HOSPITALIST

## 2022-01-31 PROCEDURE — 36415 COLL VENOUS BLD VENIPUNCTURE: CPT | Performed by: HOSPITALIST

## 2022-01-31 PROCEDURE — 96366 THER/PROPH/DIAG IV INF ADDON: CPT | Mod: 59

## 2022-01-31 PROCEDURE — 96375 TX/PRO/DX INJ NEW DRUG ADDON: CPT

## 2022-01-31 RX ORDER — HYDROCODONE BITARTRATE AND ACETAMINOPHEN 7.5; 325 MG/1; MG/1
1 TABLET ORAL EVERY 6 HOURS PRN
Qty: 8 TABLET | Refills: 0 | Status: SHIPPED | OUTPATIENT
Start: 2022-01-31 | End: 2022-02-02

## 2022-01-31 RX ADMIN — PANTOPRAZOLE SODIUM 40 MG: 40 TABLET, DELAYED RELEASE ORAL at 05:01

## 2022-01-31 RX ADMIN — MORPHINE SULFATE 4 MG: 4 INJECTION, SOLUTION INTRAMUSCULAR; INTRAVENOUS at 07:01

## 2022-01-31 RX ADMIN — LISINOPRIL 20 MG: 20 TABLET ORAL at 10:01

## 2022-01-31 RX ADMIN — VENLAFAXINE HYDROCHLORIDE 225 MG: 150 CAPSULE, EXTENDED RELEASE ORAL at 10:01

## 2022-01-31 RX ADMIN — HYDROCODONE BITARTRATE AND ACETAMINOPHEN 1 TABLET: 5; 325 TABLET ORAL at 02:01

## 2022-01-31 RX ADMIN — PIPERACILLIN SODIUM AND TAZOBACTAM SODIUM 3.38 G: 3; .375 INJECTION, POWDER, LYOPHILIZED, FOR SOLUTION INTRAVENOUS at 02:01

## 2022-01-31 RX ADMIN — PIPERACILLIN SODIUM AND TAZOBACTAM SODIUM 3.38 G: 3; .375 INJECTION, POWDER, LYOPHILIZED, FOR SOLUTION INTRAVENOUS at 10:01

## 2022-01-31 NOTE — PLAN OF CARE
Problem: Adult Inpatient Plan of Care  Goal: Plan of Care Review  Outcome: Met  Goal: Patient-Specific Goal (Individualized)  Outcome: Met  Goal: Absence of Hospital-Acquired Illness or Injury  Outcome: Met  Goal: Optimal Comfort and Wellbeing  Outcome: Met  Goal: Readiness for Transition of Care  Outcome: Met     Problem: Bariatric Environmental Safety  Goal: Safety Maintained with Care  Outcome: Met     Problem: Fall Injury Risk  Goal: Absence of Fall and Fall-Related Injury  Outcome: Met

## 2022-01-31 NOTE — DISCHARGE SUMMARY
[facility]  Discharge Summary  Patient Name: Jojo Boateng MRN: [id number]   Patient Class: OP- Observation  Length of Stay: 0   Admission Date: 1/30/2022  7:36 AM Attending Physician: Lindsey Marinelli MD   Primary Care Provider: RICCI Sweeney Face-to-Face encounter date: 01/31/2022   Chief Complaint: Abdominal Pain (Abdominal pain in R upper; also flank pain; this all has been going on for 1 month per pt. Rates pain 8/10; complain of N/V)    Date of Discharge: 1/31/2022  Discharge Disposition: Home or Self Care  Condition: Stable       Reason for Hospitalization   Cholecystitis   Impacted gall stone of cystic duct  Essential hypertension      Patient Active Problem List   Diagnosis    Acute lateral meniscal injury of right knee    Internal derangement of right knee    Chondromalacia of right knee    Knee internal derangement    Right knee pain    Impacted gallstone of cystic duct    Urinary tract infection without hematuria    Abdominal pain    Essential hypertension       Brief History of Present Illness    Jojo Boateng is a 36 y.o.  female who  has a past medical history of Anxiety, Bipolar disorder, Depression, Hallucination, History of psychiatric hospitalization, psychiatric care, Hypertension, Arcelia, Psychiatric problem, Psychosis, Sleep difficulties, Suicide attempt, and Therapy.. The patient presented to [facility] on 1/30/2022 with a primary complaint of Abdominal Pain (Abdominal pain in R upper; also flank pain; this all has been going on for 1 month per pt. Rates pain 8/10; complain of N/V)  .     For the full HPI please refer to the History & Physical from this admission.    Hospital Course By Problem with Pertinent Findings     Admitted for impacted gall stone. General surgery consulted and did Laparoscopic cholecystectomy. Patient symptoms improved. Discharged home in stable condition on PO pain medicines. Follow with general surgery as directed.  "      Patient was seen and examined on the date of discharge and determined to be suitable for discharge.    Physical Exam  /68   Pulse 73   Temp 97.6 °F (36.4 °C)   Resp 20   Ht 5' 6" (1.676 m)   Wt (!) 147.4 kg (325 lb)   SpO2 98%   Breastfeeding No   BMI 52.46 kg/m²   Vitals reviewed.    Constitutional: No distress.   HENT: Atraumatic.   Cardiovascular: Normal rate, regular rhythm and normal heart sounds.   Pulmonary/Chest: Effort normal. Clear to auscultation bilaterally. No wheezes.   Abdominal: Soft. Bowel sounds are normal. Exhibits no distension and no mass. No tenderness  Neurological: Alert.   Skin: Skin is warm and dry.     Following labs were Reviewed   Recent Labs   Lab 01/31/22  0711 01/31/22  0712   WBC  --  6.99   HGB  --  11.6*   HCT  --  37.9   PLT  --  184   CALCIUM 8.6*  --    ALBUMIN 3.9  --    PROT 6.7  --      --    K 4.5  --    CO2 23  --      --    BUN 11  --    CREATININE 0.6  --    ALKPHOS 59  --    ALT 28  --    AST 35  --    BILITOT 0.8  --      No results found for: POCTGLUCOSE     All labs within the past 24 hours have been reviewed    Microbiology Results (last 7 days)     ** No results found for the last 168 hours. **        US Abdomen Limited   Final Result          No results found for this or any previous visit.      Consultants and Procedures   Consultants:  General Surgery    Procedures:   Laparoscopic cholecystectomy    Discharge Information:   Diet:  Resume regular diet    Physical Activity:  Activity as tolerated    Instructions:  1. Take all medications as prescribed  2. Keep all follow-up appointments  3. Return to the hospital or call your primary care physicians if any worsening symptoms such as abdominal pain  occur.    Follow-Up Appointments:  1. Please call your primary care physician to schedule an appointment in 1 week time.      Pending laboratory work/Tests to be performed/followed by the Primary care Physician: none    The patient was " discharged in the care of her parents//wife/family/caregiver, with discharge instructions were reviewed in written and verbal form. All pertinent questions were discussed and prescriptions were provided. The importance of making follow up appointments and compliance of medications has been stressed repeatedly. The patient will follow up in 1 week or sooner as needed with the PCP, and the patient is on board with the plan. Upon discharge, patient needs to be on following medications.    Discharge Medications:     Medication List      START taking these medications    HYDROcodone-acetaminophen 7.5-325 mg per tablet  Commonly known as: NORCO  Take 1 tablet by mouth every 6 (six) hours as needed for Pain.        CHANGE how you take these medications    venlafaxine 150 MG Cp24  Commonly known as: EFFEXOR XR  Take 1 capsule (150 mg total) by mouth once daily.  What changed: how much to take        CONTINUE taking these medications    lisinopriL 20 MG tablet  Commonly known as: PRINIVIL,ZESTRIL     OLANZapine 5 MG tablet  Commonly known as: ZyPREXA  Take 1 tablet (5 mg total) by mouth every evening.     pantoprazole 20 MG tablet  Commonly known as: PROTONIX        STOP taking these medications    amLODIPine 5 MG tablet  Commonly known as: NORVASC     lisinopriL-hydrochlorothiazide 20-25 mg Tab  Commonly known as: PRINZIDE,ZESTORETIC           Where to Get Your Medications      Information about where to get these medications is not yet available    Ask your nurse or doctor about these medications  · HYDROcodone-acetaminophen 7.5-325 mg per tablet           I spent 30 minutes preparing the discharge including reviewing records from previous encounters, preparation of discharge summary, assessing and final examination of the patient, discharge medicine reconciliation, discussing plan of care, follow up and education and prescriptions.       Lindsey Marinelli  [facility] Hospitalist  01/31/2022
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
## 2022-02-14 ENCOUNTER — OFFICE VISIT (OUTPATIENT)
Dept: SURGERY | Facility: CLINIC | Age: 37
End: 2022-02-14
Payer: COMMERCIAL

## 2022-02-14 VITALS — TEMPERATURE: 98 F | SYSTOLIC BLOOD PRESSURE: 159 MMHG | DIASTOLIC BLOOD PRESSURE: 95 MMHG | HEART RATE: 105 BPM

## 2022-02-14 DIAGNOSIS — Z90.49 S/P LAPAROSCOPIC CHOLECYSTECTOMY: Primary | ICD-10-CM

## 2022-02-14 PROCEDURE — 3080F DIAST BP >= 90 MM HG: CPT | Mod: CPTII,S$GLB,, | Performed by: SURGERY

## 2022-02-14 PROCEDURE — 4010F PR ACE/ARB THEARPY RXD/TAKEN: ICD-10-PCS | Mod: CPTII,S$GLB,, | Performed by: SURGERY

## 2022-02-14 PROCEDURE — 4010F ACE/ARB THERAPY RXD/TAKEN: CPT | Mod: CPTII,S$GLB,, | Performed by: SURGERY

## 2022-02-14 PROCEDURE — 99024 PR POST-OP FOLLOW-UP VISIT: ICD-10-PCS | Mod: S$GLB,,, | Performed by: SURGERY

## 2022-02-14 PROCEDURE — 3077F SYST BP >= 140 MM HG: CPT | Mod: CPTII,S$GLB,, | Performed by: SURGERY

## 2022-02-14 PROCEDURE — 3080F PR MOST RECENT DIASTOLIC BLOOD PRESSURE >= 90 MM HG: ICD-10-PCS | Mod: CPTII,S$GLB,, | Performed by: SURGERY

## 2022-02-14 PROCEDURE — 3077F PR MOST RECENT SYSTOLIC BLOOD PRESSURE >= 140 MM HG: ICD-10-PCS | Mod: CPTII,S$GLB,, | Performed by: SURGERY

## 2022-02-14 PROCEDURE — 99024 POSTOP FOLLOW-UP VISIT: CPT | Mod: S$GLB,,, | Performed by: SURGERY

## 2022-02-16 NOTE — PROGRESS NOTES
GENERAL SURGERY  POST-OP PROGRESS NOTE    HPI: Jojo Boateng is a 36 y.o. female status post laparoscopic cholecystectomy for cholecystitis from impacted stone at the cystic duct.  Here for scheduled follow-up.  Doing well since discharge.  Still with occasional pain specially in the epigastric incision site which is described as a pulling sensation.  No nausea, vomiting, fevers, chills, yellowing of the skin or eyes.    VITALS:  Vitals:    02/14/22 1342   BP: (!) 159/95   Pulse: 105   Temp: 98.2 °F (36.8 °C)       PHYSICAL EXAM:  All abdominal port sites well healed without signs of infection or breakdown    PATHOLOGY:  GALLBLADDER, CHOLECYSTECTOMY:   - CHRONIC CHOLECYSTITIS.   - CHOLESTEROLOSIS.   _______________________________________________________________________    ASSESSMENT & PLAN:  36 y.o. female s/p laparoscopic cholecystectomy  - doing as expected  - occasional epigastric discomfort should improve over time  - return to work papers completed  - return to clinic p.r.n.

## 2022-06-17 ENCOUNTER — HOSPITAL ENCOUNTER (EMERGENCY)
Facility: HOSPITAL | Age: 37
Discharge: HOME | End: 2022-06-18
Attending: EMERGENCY MEDICINE
Payer: COMMERCIAL

## 2022-06-17 DIAGNOSIS — S39.012A LUMBAR STRAIN, INITIAL ENCOUNTER: Primary | ICD-10-CM

## 2022-06-17 DIAGNOSIS — T14.8XXA ABRASION: ICD-10-CM
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
## 2022-06-17 PROCEDURE — 25000003 PHARM REV CODE 250: Performed by: EMERGENCY MEDICINE

## 2022-06-17 PROCEDURE — 81025 URINE PREGNANCY TEST: CPT | Performed by: EMERGENCY MEDICINE

## 2022-06-17 PROCEDURE — 99285 EMERGENCY DEPT VISIT HI MDM: CPT | Mod: 25

## 2022-06-17 PROCEDURE — 99284 EMERGENCY DEPT VISIT MOD MDM: CPT | Mod: 25

## 2022-06-17 RX ORDER — ACETAMINOPHEN 500 MG
1000 TABLET ORAL
Status: COMPLETED | OUTPATIENT
Start: 2022-06-17 | End: 2022-06-17

## 2022-06-17 RX ADMIN — IBUPROFEN 600 MG: 400 TABLET ORAL at 11:06

## 2022-06-17 RX ADMIN — ACETAMINOPHEN 1000 MG: 500 TABLET, FILM COATED ORAL at 11:06

## 2022-06-18 VITALS
HEART RATE: 72 BPM | BODY MASS INDEX: 45.99 KG/M2 | HEIGHT: 67 IN | WEIGHT: 293 LBS | TEMPERATURE: 98 F | DIASTOLIC BLOOD PRESSURE: 77 MMHG | OXYGEN SATURATION: 98 % | SYSTOLIC BLOOD PRESSURE: 128 MMHG | RESPIRATION RATE: 14 BRPM

## 2022-06-18 LAB
B-HCG UR QL: NEGATIVE
BILIRUB UR QL STRIP: NEGATIVE
CLARITY UR: CLEAR
COLOR UR: YELLOW
CTP QC/QA: YES
GLUCOSE UR QL STRIP: NEGATIVE
HGB UR QL STRIP: NEGATIVE
KETONES UR QL STRIP: ABNORMAL
LEUKOCYTE ESTERASE UR QL STRIP: NEGATIVE
NITRITE UR QL STRIP: NEGATIVE
PH UR STRIP: 6 [PH] (ref 5–8)
PROT UR QL STRIP: ABNORMAL
SP GR UR STRIP: 1.03 (ref 1–1.03)
URN SPEC COLLECT METH UR: ABNORMAL
UROBILINOGEN UR STRIP-ACNC: ABNORMAL EU/DL

## 2022-06-18 PROCEDURE — 25000003 PHARM REV CODE 250: Performed by: EMERGENCY MEDICINE

## 2022-06-18 PROCEDURE — 81003 URINALYSIS AUTO W/O SCOPE: CPT | Performed by: EMERGENCY MEDICINE

## 2022-06-18 RX ORDER — METHOCARBAMOL 500 MG/1
1000 TABLET, FILM COATED ORAL 4 TIMES DAILY
Qty: 40 TABLET | Refills: 0 | Status: SHIPPED | OUTPATIENT
Start: 2022-06-18 | End: 2022-06-23

## 2022-06-18 RX ORDER — MUPIROCIN 20 MG/G
1 OINTMENT TOPICAL
Status: COMPLETED | OUTPATIENT
Start: 2022-06-18 | End: 2022-06-18

## 2022-06-18 RX ORDER — HYDROCODONE BITARTRATE AND ACETAMINOPHEN 10; 325 MG/1; MG/1
1 TABLET ORAL EVERY 6 HOURS PRN
Qty: 12 TABLET | Refills: 0 | Status: SHIPPED | OUTPATIENT
Start: 2022-06-18 | End: 2022-06-21

## 2022-06-18 RX ORDER — MUPIROCIN 20 MG/G
OINTMENT TOPICAL 3 TIMES DAILY
Qty: 30 G | Refills: 0 | Status: SHIPPED | OUTPATIENT
Start: 2022-06-18

## 2022-06-18 RX ADMIN — MUPIROCIN 22 G: 20 OINTMENT TOPICAL at 01:06
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
## 2022-06-18 NOTE — DISCHARGE INSTRUCTIONS
Local care to the abrasion clean daily twice daily with warm soapy water then dry off and then apply antibiotic ointment and dressing.  Return for any signs of infection.

## 2022-10-02 ENCOUNTER — HOSPITAL ENCOUNTER (EMERGENCY)
Facility: HOSPITAL | Age: 37
Discharge: HOME | End: 2022-10-02
Attending: EMERGENCY MEDICINE
Payer: COMMERCIAL

## 2022-10-02 VITALS
RESPIRATION RATE: 18 BRPM | HEIGHT: 67 IN | BODY MASS INDEX: 45.99 KG/M2 | SYSTOLIC BLOOD PRESSURE: 130 MMHG | TEMPERATURE: 98 F | WEIGHT: 293 LBS | DIASTOLIC BLOOD PRESSURE: 89 MMHG | OXYGEN SATURATION: 100 % | HEART RATE: 63 BPM

## 2022-10-02 DIAGNOSIS — R10.31 RIGHT LOWER QUADRANT ABDOMINAL PAIN: Primary | ICD-10-CM

## 2022-10-02 LAB
ALBUMIN SERPL BCP-MCNC: 4.5 G/DL (ref 3.5–5.2)
ALP SERPL-CCNC: 68 U/L (ref 55–135)
ALT SERPL W/O P-5'-P-CCNC: 29 U/L (ref 10–44)
ANION GAP SERPL CALC-SCNC: 10 MMOL/L (ref 8–16)
AST SERPL-CCNC: 24 U/L (ref 10–40)
B-HCG UR QL: NEGATIVE
BACTERIA #/AREA URNS HPF: NEGATIVE /HPF
BASOPHILS # BLD AUTO: 0.01 K/UL (ref 0–0.2)
BASOPHILS NFR BLD: 0.2 % (ref 0–1.9)
BILIRUB SERPL-MCNC: 0.7 MG/DL (ref 0.1–1)
BILIRUB UR QL STRIP: NEGATIVE
BUN SERPL-MCNC: 13 MG/DL (ref 6–20)
CALCIUM SERPL-MCNC: 9.4 MG/DL (ref 8.7–10.5)
CHLORIDE SERPL-SCNC: 109 MMOL/L (ref 95–110)
CLARITY UR: CLEAR
CO2 SERPL-SCNC: 21 MMOL/L (ref 23–29)
COLOR UR: YELLOW
CREAT SERPL-MCNC: 0.7 MG/DL (ref 0.5–1.4)
CTP QC/QA: YES
DIFFERENTIAL METHOD: NORMAL
EOSINOPHIL # BLD AUTO: 0 K/UL (ref 0–0.5)
EOSINOPHIL NFR BLD: 0 % (ref 0–8)
ERYTHROCYTE [DISTWIDTH] IN BLOOD BY AUTOMATED COUNT: 13.7 % (ref 11.5–14.5)
EST. GFR  (NO RACE VARIABLE): >60 ML/MIN/1.73 M^2
GLUCOSE SERPL-MCNC: 87 MG/DL (ref 70–110)
GLUCOSE UR QL STRIP: NEGATIVE
HCT VFR BLD AUTO: 42.2 % (ref 37–48.5)
HGB BLD-MCNC: 13.9 G/DL (ref 12–16)
HGB UR QL STRIP: NEGATIVE
HYALINE CASTS #/AREA URNS LPF: 10 /LPF
IMM GRANULOCYTES # BLD AUTO: 0.01 K/UL (ref 0–0.04)
IMM GRANULOCYTES NFR BLD AUTO: 0.2 % (ref 0–0.5)
KETONES UR QL STRIP: ABNORMAL
LEUKOCYTE ESTERASE UR QL STRIP: NEGATIVE
LIPASE SERPL-CCNC: 58 U/L (ref 4–60)
LYMPHOCYTES # BLD AUTO: 1.5 K/UL (ref 1–4.8)
LYMPHOCYTES NFR BLD: 23.4 % (ref 18–48)
MCH RBC QN AUTO: 27.3 PG (ref 27–31)
MCHC RBC AUTO-ENTMCNC: 32.9 G/DL (ref 32–36)
MCV RBC AUTO: 83 FL (ref 82–98)
MICROSCOPIC COMMENT: ABNORMAL
MONOCYTES # BLD AUTO: 0.5 K/UL (ref 0.3–1)
MONOCYTES NFR BLD: 8.4 % (ref 4–15)
NEUTROPHILS # BLD AUTO: 4.3 K/UL (ref 1.8–7.7)
NEUTROPHILS NFR BLD: 67.8 % (ref 38–73)
NITRITE UR QL STRIP: NEGATIVE
NRBC BLD-RTO: 0 /100 WBC
PH UR STRIP: 7 [PH] (ref 5–8)
PLATELET # BLD AUTO: 238 K/UL (ref 150–450)
PMV BLD AUTO: 10.3 FL (ref 9.2–12.9)
POTASSIUM SERPL-SCNC: 3.8 MMOL/L (ref 3.5–5.1)
PROT SERPL-MCNC: 7.8 G/DL (ref 6–8.4)
PROT UR QL STRIP: ABNORMAL
RBC # BLD AUTO: 5.09 M/UL (ref 4–5.4)
RBC #/AREA URNS HPF: 8 /HPF (ref 0–4)
SARS-COV-2 RDRP RESP QL NAA+PROBE: NEGATIVE
SODIUM SERPL-SCNC: 140 MMOL/L (ref 136–145)
SP GR UR STRIP: >1.03 (ref 1–1.03)
SQUAMOUS #/AREA URNS HPF: 8 /HPF
URN SPEC COLLECT METH UR: ABNORMAL
UROBILINOGEN UR STRIP-ACNC: NEGATIVE EU/DL
WBC # BLD AUTO: 6.33 K/UL (ref 3.9–12.7)
WBC #/AREA URNS HPF: 13 /HPF (ref 0–5)

## 2022-10-02 PROCEDURE — 96374 THER/PROPH/DIAG INJ IV PUSH: CPT

## 2022-10-02 PROCEDURE — U0002 COVID-19 LAB TEST NON-CDC: HCPCS | Performed by: EMERGENCY MEDICINE

## 2022-10-02 PROCEDURE — 25000003 PHARM REV CODE 250: Performed by: EMERGENCY MEDICINE

## 2022-10-02 PROCEDURE — 63600175 PHARM REV CODE 636 W HCPCS: Performed by: EMERGENCY MEDICINE

## 2022-10-02 PROCEDURE — 99285 EMERGENCY DEPT VISIT HI MDM: CPT | Mod: 25

## 2022-10-02 PROCEDURE — 87086 URINE CULTURE/COLONY COUNT: CPT | Performed by: NURSE PRACTITIONER

## 2022-10-02 PROCEDURE — 80053 COMPREHEN METABOLIC PANEL: CPT | Performed by: NURSE PRACTITIONER

## 2022-10-02 PROCEDURE — 83690 ASSAY OF LIPASE: CPT | Performed by: EMERGENCY MEDICINE

## 2022-10-02 PROCEDURE — 36415 COLL VENOUS BLD VENIPUNCTURE: CPT | Performed by: EMERGENCY MEDICINE

## 2022-10-02 PROCEDURE — 25500020 PHARM REV CODE 255: Performed by: EMERGENCY MEDICINE

## 2022-10-02 PROCEDURE — 81025 URINE PREGNANCY TEST: CPT | Performed by: NURSE PRACTITIONER

## 2022-10-02 PROCEDURE — 81001 URINALYSIS AUTO W/SCOPE: CPT | Performed by: NURSE PRACTITIONER

## 2022-10-02 PROCEDURE — 85025 COMPLETE CBC W/AUTO DIFF WBC: CPT | Performed by: NURSE PRACTITIONER

## 2022-10-02 RX ORDER — VENLAFAXINE HYDROCHLORIDE 75 MG/1
CAPSULE, EXTENDED RELEASE ORAL
COMMUNITY

## 2022-10-02 RX ORDER — VORTIOXETINE 10 MG/1
TABLET, FILM COATED ORAL
COMMUNITY

## 2022-10-02 RX ORDER — ONDANSETRON 2 MG/ML
4 INJECTION INTRAMUSCULAR; INTRAVENOUS
Status: COMPLETED | OUTPATIENT
Start: 2022-10-02 | End: 2022-10-02

## 2022-10-02 RX ORDER — ONDANSETRON 4 MG/1
4 TABLET, ORALLY DISINTEGRATING ORAL EVERY 8 HOURS PRN
COMMUNITY
Start: 2022-09-20 | End: 2022-10-27

## 2022-10-02 RX ORDER — PANTOPRAZOLE SODIUM 40 MG/1
40 TABLET, DELAYED RELEASE ORAL DAILY
COMMUNITY
Start: 2022-06-17

## 2022-10-02 RX ORDER — HYDROCODONE BITARTRATE AND ACETAMINOPHEN 10; 325 MG/1; MG/1
TABLET ORAL
COMMUNITY
Start: 2022-06-23

## 2022-10-02 RX ORDER — VORTIOXETINE 20 MG/1
1 TABLET, FILM COATED ORAL DAILY
COMMUNITY

## 2022-10-02 RX ORDER — DICYCLOMINE HYDROCHLORIDE 20 MG/1
20 TABLET ORAL 2 TIMES DAILY
Qty: 20 TABLET | Refills: 0 | Status: SHIPPED | OUTPATIENT
Start: 2022-10-02 | End: 2022-11-01

## 2022-10-02 RX ORDER — NITROFURANTOIN 25; 75 MG/1; MG/1
100 CAPSULE ORAL EVERY 12 HOURS
COMMUNITY
Start: 2022-08-31

## 2022-10-02 RX ORDER — SEMAGLUTIDE 1.34 MG/ML
INJECTION, SOLUTION SUBCUTANEOUS
COMMUNITY

## 2022-10-02 RX ORDER — TIZANIDINE 2 MG/1
TABLET ORAL
COMMUNITY
Start: 2022-06-23

## 2022-10-02 RX ORDER — LANSOPRAZOLE 30 MG/1
30 CAPSULE, DELAYED RELEASE ORAL 2 TIMES DAILY
COMMUNITY
Start: 2022-09-26 | End: 2022-10-27 | Stop reason: ALTCHOICE

## 2022-10-02 RX ADMIN — SODIUM CHLORIDE 1000 ML: 0.9 INJECTION, SOLUTION INTRAVENOUS at 06:10

## 2022-10-02 RX ADMIN — ONDANSETRON 4 MG: 2 INJECTION INTRAMUSCULAR; INTRAVENOUS at 06:10

## 2022-10-02 RX ADMIN — IOHEXOL 100 ML: 350 INJECTION, SOLUTION INTRAVENOUS at 05:10

## 2022-10-02 NOTE — ED PROVIDER NOTES
Encounter Date: 10/2/2022       History     Chief Complaint   Patient presents with    Abdominal Pain     Gastric bypass surgery on the . Pt presents to the er stating that she is having abdominal pain and believes that she may be dehydrated.     36 Year old female presents complaining of abdominal discomfort, patient describes nausea and crampy abdominal discomfort patient reports gastric bypass surgery last month patient reports no immediate complications from gastric bypass surgery patient reports that she feels dehydrated, patient denies fever cough or shortness of breath chest pain or change in bowel habits patient has no other acute complaints at this time.    Review of patient's allergies indicates:   Allergen Reactions    Latex     Ultram [tramadol] Hives     Past Medical History:   Diagnosis Date    Anxiety     Bipolar disorder     Depression     Hallucination     History of psychiatric hospitalization     Hx of psychiatric care     Hypertension     Arcelia     Psychiatric problem     Psychosis     Sleep difficulties     Suicide attempt     multiple attempts, klonopin/benadryl overdose last time; always by overdose    Therapy      Past Surgical History:   Procedure Laterality Date    KNEE ARTHROSCOPY Right     LAPAROSCOPIC CHOLECYSTECTOMY N/A 2022    Procedure: CHOLECYSTECTOMY, LAPAROSCOPIC;  Surgeon: Munir Major Jr., MD;  Location: [facility];  Service: General;  Laterality: N/A;     Family History   Problem Relation Age of Onset    Bipolar disorder Mother      Social History     Tobacco Use    Smoking status: Former     Types: Cigarettes     Quit date: 2013     Years since quittin.2    Smokeless tobacco: Never    Tobacco comments:     stopped in    Substance Use Topics    Alcohol use: Yes     Comment: occassionally    Drug use: No     Review of Systems   Constitutional:  Negative for fever.   HENT:  Negative for congestion, rhinorrhea, sore throat and trouble swallowing.    Eyes:   Negative for visual disturbance.   Respiratory:  Negative for cough, chest tightness, shortness of breath and wheezing.    Cardiovascular:  Negative for chest pain, palpitations and leg swelling.   Gastrointestinal:  Positive for abdominal pain and nausea. Negative for abdominal distention, constipation, diarrhea and vomiting.   Genitourinary:  Negative for difficulty urinating, dysuria, flank pain and frequency.   Musculoskeletal:  Negative for arthralgias, back pain, joint swelling and neck pain.   Skin:  Negative for color change and rash.   Neurological:  Negative for dizziness, syncope, speech difficulty, weakness, numbness and headaches.   All other systems reviewed and are negative.    Physical Exam     Initial Vitals [10/02/22 1557]   BP Pulse Resp Temp SpO2   (!) 141/90 97 18 98.2 °F (36.8 °C) 98 %      MAP       --         Physical Exam    Nursing note and vitals reviewed.  Constitutional: She appears well-developed and well-nourished. She is not diaphoretic. No distress.   HENT:   Head: Normocephalic and atraumatic.   Right Ear: External ear normal.   Left Ear: External ear normal.   Nose: Nose normal.   Mouth/Throat: Oropharynx is clear and moist. No oropharyngeal exudate.   Eyes: Conjunctivae and EOM are normal. Pupils are equal, round, and reactive to light. Right eye exhibits no discharge. Left eye exhibits no discharge. No scleral icterus.   Neck: Neck supple. No thyromegaly present. No tracheal deviation present. No JVD present.   Normal range of motion.  Cardiovascular:  Normal rate, regular rhythm, normal heart sounds and intact distal pulses.     Exam reveals no gallop and no friction rub.       No murmur heard.  Pulmonary/Chest: Breath sounds normal. No stridor. No respiratory distress. She has no wheezes. She has no rhonchi. She has no rales. She exhibits no tenderness.   Abdominal: Abdomen is soft. Bowel sounds are normal. She exhibits no distension and no mass. There is no abdominal  tenderness. There is no rebound and no guarding.   Musculoskeletal:         General: No tenderness or edema. Normal range of motion.      Cervical back: Normal range of motion and neck supple.     Lymphadenopathy:     She has no cervical adenopathy.   Neurological: She is alert and oriented to person, place, and time. She has normal strength. No cranial nerve deficit or sensory deficit.   Skin: Skin is warm and dry. No rash and no abscess noted. No erythema. No pallor.       ED Course   Procedures  Labs Reviewed   COMPREHENSIVE METABOLIC PANEL - Abnormal; Notable for the following components:       Result Value    CO2 21 (*)     All other components within normal limits   URINALYSIS, REFLEX TO URINE CULTURE - Abnormal; Notable for the following components:    Specific Gravity, UA >1.030 (*)     Protein, UA 1+ (*)     Ketones, UA 3+ (*)     All other components within normal limits    Narrative:     In and Out Cath as needed it patient unable to void  Specimen Source->Urine   URINALYSIS MICROSCOPIC - Abnormal; Notable for the following components:    RBC, UA 8 (*)     WBC, UA 13 (*)     Hyaline Casts, UA 10 (*)     All other components within normal limits    Narrative:     In and Out Cath as needed it patient unable to void  Specimen Source->Urine   CULTURE, URINE   CBC W/ AUTO DIFFERENTIAL   SARS-COV-2 RNA AMPLIFICATION, QUAL   LIPASE   POCT URINE PREGNANCY          Imaging Results              CT Abdomen Pelvis With Contrast (Final result)  Result time 10/02/22 18:27:05      Final result by Jonatan Zayas MD (10/02/22 18:27:05)                   Narrative:    CT ABDOMEN AND PELVIS WITH CONTRAST    HISTORY: Abdominal pain. Recent bariatric surgery.    CMS MANDATED QUALITY DATA - CT RADIATION  436    All CT scans at this facility utilize dose modulation, iterative reconstruction, and/or weight based dosing when appropriate to reduce radiation dose to as low as reasonably achievable    FINDINGS:    Post infusion  images were obtained from the lung bases to the pubic symphysis. 100 cc nonionic contrast was administered for the examination.    The lung bases are unremarkable.    Small hepatic cysts are noted. The liver is otherwise normal. The gallbladder is absent. Biliary tree is nondilated. The spleen is mildly enlarged at 15 cm in longitudinal dimension. The pancreas has a normal appearance. The adrenal glands are normal. Small lower pole left renal cyst is noted. 5 mm nonobstructing calculus is demonstrated at the midpole the right kidney. The aorta is normal in caliber.    There is no pathologic bowel wall thickening or evidence of obstruction. The appendix is normal. Changes of sleeve gastrectomy are noted. No free air or postoperative fluid collection is identified.    Images of the pelvis demonstrate a 2.3 cm right ovarian cyst. Bowel structures are normal with the exception of scattered colonic diverticula.. Urinary bladder is contracted.    IMPRESSION:      1. Sleeve gastrectomy, without evidence of postoperative complication.  2. Mild splenomegaly.  3. Nonobstructing right renal calculus.  4. 2.3 cm right ovarian cyst.  5. Additional findings as above.    Electronically signed by:  Jonatan Zayas MD  10/2/2022 6:27 PM CDT Workstation: [id number]                                     Medications   sodium chloride 0.9% bolus 1,000 mL (0 mLs Intravenous Stopped 10/2/22 2103)   ondansetron injection 4 mg (4 mg Intravenous Given 10/2/22 1829)   iohexoL (OMNIPAQUE 350) injection 100 mL (100 mLs Intravenous Given 10/2/22 1757)     Medical Decision Making:   History:   Old Medical Records: I decided to obtain old medical records.  Initial Assessment:   Emergent evaluation of a 36-year-old female presenting with abdominal discomfort differential diagnosis includes surgical complication, electrolyte abnormality, endocrine dysfunction, dehydration, gastritis          Attending Attestation:             Attending ED Notes:    Patient's imaging shows no acute abnormalities patient's labs show no significant acute abnormalities patient is diagnosed with abdominal discomfort most likely secondary to recent gastric bypass.  Patient is to follow-up with GI in the next 2-3 days for further evaluation and management patient will be given a course of Bentyl patient offered Zofran but is declining stating that she has a supply at home patient is cautioned to return immediately to the ER for any worsening or for any further concerns    I had a detailed discussion with the patient and/or guardian regarding: The historical points, exam findings, and diagnostic results supporting the discharge diagnosis, lab results, pertinent radiology results, and the need for outpatient follow-up, for definitive care with a family practitioner and to return to the emergency department if symptoms worsen or persist or if there are any questions or concerns that arise at home. All questions have been answered in detail. Strict return to Emergency Department precautions have been provided.     A dictation software program was used for this note.  Please expect some simple typographical  errors in this note.     This patient was seen during the context of the Covid 19 global pandemic where local, state, hospital guidelines, were followed to the best of ability given the circumstances of the pandemic.                       Clinical Impression:   Final diagnoses:  [R10.31] Right lower quadrant abdominal pain (Primary)      ED Disposition Condition    Discharge Stable          ED Prescriptions       Medication Sig Dispense Start Date End Date Auth. Provider    dicyclomine (BENTYL) 20 mg tablet Take 1 tablet (20 mg total) by mouth 2 (two) times daily. 20 tablet 10/2/2022 11/1/2022 Krystian Carvajal MD          Follow-up Information       Follow up With Specialties Details Why Contact Info Additional Information    [facility] - Emergency Dept Emergency  Medicine  If symptoms worsen [address] [facility] [id number]  [phone] 1st floor    your GI doctor  Schedule an appointment as soon as possible for a visit in 2 days                Krystian Carvajal MD  10/03/22 6186

## 2022-10-02 NOTE — Clinical Note
"Jojo Hiltonanda" Tasneem was seen and treated in our emergency department on 10/2/2022.  She may return to work on 10/05/2022.       If you have any questions or concerns, please don't hesitate to call.      Krystian Carvajal MD"

## 2022-10-03 NOTE — ED NOTES
Called lab to find out why urine sample is not being run. Lab cannot find sample at this time. Pt sent to collect new sample.

## 2022-10-04 LAB
BACTERIA UR CULT: NORMAL
BACTERIA UR CULT: NORMAL

## 2022-10-27 ENCOUNTER — HOSPITAL ENCOUNTER (EMERGENCY)
Facility: HOSPITAL | Age: 37
Discharge: HOME | End: 2022-10-27
Attending: EMERGENCY MEDICINE
Payer: COMMERCIAL

## 2022-10-27 VITALS
SYSTOLIC BLOOD PRESSURE: 103 MMHG | BODY MASS INDEX: 44.26 KG/M2 | HEIGHT: 67 IN | TEMPERATURE: 98 F | HEART RATE: 61 BPM | OXYGEN SATURATION: 100 % | RESPIRATION RATE: 18 BRPM | WEIGHT: 282 LBS | DIASTOLIC BLOOD PRESSURE: 68 MMHG

## 2022-10-27 DIAGNOSIS — K92.0 HEMATEMESIS WITH NAUSEA: Primary | ICD-10-CM

## 2022-10-27 DIAGNOSIS — K92.2 UPPER GI BLEED: ICD-10-CM

## 2022-10-27 LAB
ABO + RH BLD: NORMAL
ALBUMIN SERPL BCP-MCNC: 4.2 G/DL (ref 3.5–5.2)
ALP SERPL-CCNC: 69 U/L (ref 55–135)
ALT SERPL W/O P-5'-P-CCNC: 20 U/L (ref 10–44)
ANION GAP SERPL CALC-SCNC: 6 MMOL/L (ref 8–16)
AST SERPL-CCNC: 18 U/L (ref 10–40)
BASOPHILS # BLD AUTO: 0.01 K/UL (ref 0–0.2)
BASOPHILS NFR BLD: 0.2 % (ref 0–1.9)
BILIRUB SERPL-MCNC: 1.2 MG/DL (ref 0.1–1)
BLD GP AB SCN CELLS X3 SERPL QL: NORMAL
BUN SERPL-MCNC: 9 MG/DL (ref 6–20)
CALCIUM SERPL-MCNC: 9 MG/DL (ref 8.7–10.5)
CHLORIDE SERPL-SCNC: 106 MMOL/L (ref 95–110)
CO2 SERPL-SCNC: 25 MMOL/L (ref 23–29)
CREAT SERPL-MCNC: 0.6 MG/DL (ref 0.5–1.4)
DIFFERENTIAL METHOD: NORMAL
EOSINOPHIL # BLD AUTO: 0 K/UL (ref 0–0.5)
EOSINOPHIL NFR BLD: 0 % (ref 0–8)
ERYTHROCYTE [DISTWIDTH] IN BLOOD BY AUTOMATED COUNT: 14.3 % (ref 11.5–14.5)
EST. GFR  (NO RACE VARIABLE): >60 ML/MIN/1.73 M^2
GLUCOSE SERPL-MCNC: 97 MG/DL (ref 70–110)
HCT VFR BLD AUTO: 39.8 % (ref 37–48.5)
HGB BLD-MCNC: 12.9 G/DL (ref 12–16)
IMM GRANULOCYTES # BLD AUTO: 0.01 K/UL (ref 0–0.04)
IMM GRANULOCYTES NFR BLD AUTO: 0.2 % (ref 0–0.5)
LYMPHOCYTES # BLD AUTO: 1.5 K/UL (ref 1–4.8)
LYMPHOCYTES NFR BLD: 29.7 % (ref 18–48)
MCH RBC QN AUTO: 27.6 PG (ref 27–31)
MCHC RBC AUTO-ENTMCNC: 32.4 G/DL (ref 32–36)
MCV RBC AUTO: 85 FL (ref 82–98)
MONOCYTES # BLD AUTO: 0.4 K/UL (ref 0.3–1)
MONOCYTES NFR BLD: 8.7 % (ref 4–15)
NEUTROPHILS # BLD AUTO: 3 K/UL (ref 1.8–7.7)
NEUTROPHILS NFR BLD: 61.2 % (ref 38–73)
NRBC BLD-RTO: 0 /100 WBC
PLATELET # BLD AUTO: 179 K/UL (ref 150–450)
PMV BLD AUTO: 10.3 FL (ref 9.2–12.9)
POTASSIUM SERPL-SCNC: 4.1 MMOL/L (ref 3.5–5.1)
PROT SERPL-MCNC: 7 G/DL (ref 6–8.4)
RBC # BLD AUTO: 4.68 M/UL (ref 4–5.4)
SODIUM SERPL-SCNC: 137 MMOL/L (ref 136–145)
WBC # BLD AUTO: 4.95 K/UL (ref 3.9–12.7)

## 2022-10-27 PROCEDURE — 80053 COMPREHEN METABOLIC PANEL: CPT | Performed by: EMERGENCY MEDICINE

## 2022-10-27 PROCEDURE — 85025 COMPLETE CBC W/AUTO DIFF WBC: CPT | Performed by: EMERGENCY MEDICINE

## 2022-10-27 PROCEDURE — 99283 EMERGENCY DEPT VISIT LOW MDM: CPT

## 2022-10-27 PROCEDURE — 86850 RBC ANTIBODY SCREEN: CPT | Performed by: EMERGENCY MEDICINE

## 2022-10-27 RX ORDER — ONDANSETRON 4 MG/1
8 TABLET, ORALLY DISINTEGRATING ORAL EVERY 6 HOURS PRN
Qty: 20 TABLET | Refills: 0 | Status: SHIPPED | OUTPATIENT
Start: 2022-10-27

## 2022-10-27 RX ORDER — PANTOPRAZOLE SODIUM 40 MG/10ML
40 INJECTION, POWDER, LYOPHILIZED, FOR SOLUTION INTRAVENOUS DAILY
Status: DISCONTINUED | OUTPATIENT
Start: 2022-10-27 | End: 2022-10-27 | Stop reason: HOSPADM

## 2022-10-27 RX ORDER — PANTOPRAZOLE SODIUM 20 MG/1
40 TABLET, DELAYED RELEASE ORAL DAILY
Qty: 30 TABLET | Refills: 0 | Status: SHIPPED | OUTPATIENT
Start: 2022-10-27 | End: 2022-10-27

## 2022-10-27 RX ORDER — PANTOPRAZOLE SODIUM 20 MG/1
40 TABLET, DELAYED RELEASE ORAL DAILY
Qty: 30 TABLET | Refills: 0 | Status: SHIPPED | OUTPATIENT
Start: 2022-10-27 | End: 2023-10-27

## 2022-10-27 NOTE — Clinical Note
"Jojo Miranda" Tasneem was seen and treated in our emergency department on 10/27/2022.  She may return to work on 10/28/2022.       If you have any questions or concerns, please don't hesitate to call.      Diamante COX RN    "

## 2022-10-27 NOTE — DISCHARGE INSTRUCTIONS
Clear liquid diet today then start a bland diet.  Follow up with Dr. Bailey or Dr. Toth's who is a gastroenterologist.  You may require an EGD.  If your bleeding becomes worse and vomiting persist return to the hospital.  Watch for any dark or black stools.

## 2022-10-27 NOTE — ED PROVIDER NOTES
Encounter Date: 10/27/2022       History     Chief Complaint   Patient presents with    Hematemesis     Four bouts of small amount of bright red blood in emesis since yesterday, per pt      37-year-old female who was approximately 6 weeks s/p gastric sleeve, and who has depression, hypertension, psychosis and prior suicide attempts, presents with a history that since yesterday she has had 4 episodes of emesis with bloody tinged secretions.  Blood was described as bright in appearance.  No history of any esophageal problems.  No history of any peptic ulcer disease, gastritis or stress ulcers.  Patient is not on any aspirin, NSAIDs or anticoagulants.  Stools have been normal brown in appearance.  Patient admits to nausea without any abdominal pain.  No fever.  She denies any bleeding any other sites.  No bruising easily.  No shortness of breath or hemoptysis.  No hematuria or epistaxis.  Patient has had a cholecystectomy in January of this year.  No history of any alcohol use.    Review of patient's allergies indicates:   Allergen Reactions    Latex     Ultram [tramadol] Hives     Past Medical History:   Diagnosis Date    Anxiety     Bipolar disorder     Depression     Hallucination     History of psychiatric hospitalization     Hx of psychiatric care     Hypertension     Arcelia     Psychiatric problem     Psychosis     Sleep difficulties     Suicide attempt     multiple attempts, klonopin/benadryl overdose last time; always by overdose    Therapy      Past Surgical History:   Procedure Laterality Date    KNEE ARTHROSCOPY Right     LAPAROSCOPIC CHOLECYSTECTOMY N/A 1/30/2022    Procedure: CHOLECYSTECTOMY, LAPAROSCOPIC;  Surgeon: Munir Major Jr., MD;  Location: [facility];  Service: General;  Laterality: N/A;     Family History   Problem Relation Age of Onset    Bipolar disorder Mother      Social History     Tobacco Use    Smoking status: Former     Types: Cigarettes     Quit date: 6/18/2013     Years since  quittin.3    Smokeless tobacco: Never    Tobacco comments:     stopped in 2013   Substance Use Topics    Alcohol use: Yes     Comment: occassionally    Drug use: No     Review of Systems   Constitutional:  Negative for activity change, appetite change and fever.   HENT:  Negative for sore throat.    Respiratory:  Negative for cough, choking, chest tightness, shortness of breath and stridor.    Cardiovascular:  Negative for chest pain.   Gastrointestinal:  Positive for vomiting. Negative for abdominal pain, anal bleeding, blood in stool, constipation, diarrhea and nausea.   Genitourinary:  Negative for difficulty urinating and dysuria.   Musculoskeletal:  Negative for back pain.   Skin:  Negative for rash.   Neurological:  Negative for seizures, syncope, weakness, light-headedness and headaches.   Hematological:  Does not bruise/bleed easily.   All other systems reviewed and are negative.    Physical Exam     Initial Vitals [10/27/22 0611]   BP Pulse Resp Temp SpO2   127/64 75 18 97.6 °F (36.4 °C) 97 %      MAP       --         Physical Exam    Vitals reviewed.  Constitutional: She appears well-developed and well-nourished. She is not diaphoretic. No distress.   HENT:   Head: Normocephalic and atraumatic.   Nose: Nose normal.   Mouth/Throat: Oropharynx is clear and moist. No oropharyngeal exudate.   Eyes: Conjunctivae are normal. Pupils are equal, round, and reactive to light.   Neck: Neck supple. No thyromegaly present. No tracheal deviation present. No JVD present.   Normal range of motion.  Cardiovascular:  Normal rate, regular rhythm, normal heart sounds and intact distal pulses.     Exam reveals no gallop and no friction rub.       No murmur heard.  Pulmonary/Chest: Breath sounds normal. No stridor. No respiratory distress. She has no wheezes. She has no rhonchi. She has no rales.   Abdominal: Abdomen is soft. Bowel sounds are normal. She exhibits no distension. There is no abdominal tenderness. There is no  rebound and no guarding.   Musculoskeletal:         General: No tenderness or edema. Normal range of motion.      Cervical back: Normal range of motion and neck supple.     Lymphadenopathy:     She has no cervical adenopathy.   Neurological: She is alert and oriented to person, place, and time. She has normal strength. GCS score is 15. GCS eye subscore is 4. GCS verbal subscore is 5. GCS motor subscore is 6.   Skin: Skin is warm and dry. Capillary refill takes less than 2 seconds. No rash noted. No erythema. No pallor.   Psychiatric: She has a normal mood and affect. Her behavior is normal. Judgment and thought content normal.       ED Course   Procedures  Labs Reviewed   COMPREHENSIVE METABOLIC PANEL - Abnormal; Notable for the following components:       Result Value    Total Bilirubin 1.2 (*)     Anion Gap 6 (*)     All other components within normal limits   CBC W/ AUTO DIFFERENTIAL   TYPE & SCREEN          Imaging Results    None          Medications   pantoprazole injection 40 mg (has no administration in time range)                Attending Attestation:             Attending ED Notes:   Screening labs obtained were unremarkable.  Throughout the ED visit the patient remained hemodynamically stable and had no further nausea vomiting and hematemesis.  During the course the patient received Zofran and Protonix and will be continued on that regimen as an outpatient.  She is being referred follow-up to Gastroenterology.    Differential diagnosis includes stress ulcer, peptic ulcer disease, gastritis                 Clinical Impression:   Final diagnoses:  [K92.0] Hematemesis with nausea (Primary)  [K92.2] Upper GI bleed      ED Disposition Condition    Discharge Stable          ED Prescriptions       Medication Sig Dispense Start Date End Date Auth. Provider    ondansetron (ZOFRAN-ODT) 4 MG Loretta Take 2 tablets (8 mg total) by mouth every 6 (six) hours as needed (Nausea and vomiting). 20 tablet 10/27/2022 -- Ronal  LILIAN Santiago Jr., MD    pantoprazole (PROTONIX) 20 MG tablet  (Status: Discontinued) Take 2 tablets (40 mg total) by mouth once daily. 30 tablet 10/27/2022 10/27/2022 Ronal Santiago Jr., MD    pantoprazole (PROTONIX) 20 MG tablet Take 2 tablets (40 mg total) by mouth once daily. 30 tablet 10/27/2022 10/27/2023 Ronal Santiago Jr., MD          Follow-up Information       Follow up With Specialties Details Why Contact Info    Moose Acevedo MD Gastroenterology Schedule an appointment as soon as possible for a visit  As soon as feasible [address] [facility]  [facility]  [facility] [id number] [phone]               Ronal Santiago Jr., MD  10/27/22 5404

## 2023-08-10 ENCOUNTER — OCCUPATIONAL HEALTH (OUTPATIENT)
Dept: URGENT CARE | Facility: CLINIC | Age: 38
End: 2023-08-10

## 2023-08-10 DIAGNOSIS — Z13.9 ENCOUNTER FOR SCREENING: Primary | ICD-10-CM

## 2023-08-10 LAB — COLLECTION ONLY: NORMAL

## 2023-08-10 PROCEDURE — 80305 DRUG TEST PRSMV DIR OPT OBS: CPT | Mod: S$GLB,,, | Performed by: NURSE PRACTITIONER

## 2023-08-10 PROCEDURE — 80305 OOH COLLECTION ONLY DRUG SCREEN: ICD-10-PCS | Mod: S$GLB,,, | Performed by: NURSE PRACTITIONER

## 2023-09-21 NOTE — OP NOTE
1. Primary osteoarthritis of left knee        2. Baker's cyst of knee, left          Orders Placed This Encounter   Procedures   • Large joint arthrocentesis        Impression:  Patient is here in follow up of left knee pain likely secondary to severe medial tibiofemoral osteoarthritis leading to Baker's cyst.  Treatment has included intra-articular steroid injection. Today we evaluated his posterior knee with ultrasound. There was a Baker's cyst that we aspirated for about 5 cc of serous fluid. The patient reported relief of his typical symptoms afterwards. He did report that the intra-articular steroid injection only provided a few days worth of relief. I will see him back after 4 weeks if needed. Can consider viscosupplementation versus referral for genicular nerve procedure with pain management.     Imaging Studies (I personally reviewed images in PACS and report):  Left knee x-rays most recent to this encounter reviewed. These images show severe osteoarthritis that most affects the medial joint space. There is a small joint effusion. Calcifications in the posterior knee. No acute osseous abnormalities. These findings are consistent with Kellgren-Olegario grade 4 arthritis.     Ultrasound of the soft tissue in the left posterior knee showed a Baker's cyst.  This was performed by radiology. No follow-ups on file. Patient is in agreement with the above plan. HPI:  Александр Campbell is a 78 y.o. male  who presents in follow up. Here for   Chief Complaint   Patient presents with   • Left Knee - Pain     USGI Baker's Cyst Aspiration       Since last visit: See above.     Following history reviewed and updated:  Past Medical History:   Diagnosis Date   • Allergic april 1989   • COPD (chronic obstructive pulmonary disease) ([address])    • Diabetes mellitus ([address])     Type 2   • Essential hypertension    • Heart failure (HCC)    • Hyperlipidemia    • Hypertension    • Impetigo    • Left inguinal hernia    • DATE OF PROCEDURE: 4/25/2017    ATTENDING SURGEON: Surgeon(s) and Role:     * Radha Barclay MD - Primary    Assistants:  Madhu HUNTER       PREOPERATIVE DIAGNOSIS:  Right  Chondromalacia, (excludes patella) M94.29, Loose Body M23.40, Synovitis M65.9 and Tear, Lateral meniscus, acute S83.289A    POSTOPERATIVE DIAGNOSIS:   Right  Chondromalacia, patella M22.40, Chondromalacia, (excludes patella) M94.29, Loose Body M23.40, Synovitis M65.9 and Tear, Lateral meniscus, acute S83.289A    PROCEDURES(S) PERFORMED:   1. Right  Arthroscopy, with meniscus repair (medial OR lateral) 26109  2.  Right  Arthroscopy, debridement/shaving of articular cartilage (chondroplasty) 22958  3.  Right  Arthroscopy, knee, for removal of loose body or foreign body 09745  4.  Right  Arthroscopy, knee, synovectomy, limited 93681  5.  Right  knee amniox arthrocentesis, 33965        ANESTHESIA: General    FLUIDS IN THE CASE: 1000 ml    ESTIMATED BLOOD LOSS: Minimal    COMPLICATIONS: none    CONDITION ON RETURN TO RECOVERY ROOM: Good     Expand All Collapse All       IMPLANTS UTILIZED:none    GRAFT SOURCE:  none    INDICATIONS FOR OPERATIVE PROCEDURE: Jojo Boateng is a 31 y.o.  female with history of right knee pain and pathology. The patient's history and physical examination findings consistent with the procedure performed. He noted significant problems in the area of concern with problems on activities of daily living and aggressive use of the right leg. As a result of these problems and problems with overall activity level, the patient was deemed to be an appropriate candidate for operative intervention. Nonoperative versus operative options were discussed. The risks and benefits were discussed with the patient. The patient acknowledged understanding and wished to proceed with operative intervention. Informed consent was obtained prior to the procedure. Details of the surgical procedure were explained,  Lung nodule    • Malignant melanoma of skin ([address])      Past Surgical History:   Procedure Laterality Date   • APPENDECTOMY     • CATARACT EXTRACTION, BILATERAL     • CHOLECYSTECTOMY     • CHOLECYSTECTOMY     • COLONOSCOPY      Fiberoptic   • HERNIA REPAIR     • KNEE ARTHROSCOPY Bilateral     Therapeutic   • MS CYSTO INSERTION TRANSPROSTATIC IMPLANT SINGLE N/A 3/22/2019    Procedure: CYSTOSCOPY WITH INSERTION UROLIFT;  Surgeon: Adrian Thakur MD;  Location: [facility];  Service: Urology     Social History   Social History     Substance and Sexual Activity   Alcohol Use Not Currently   • Alcohol/week: 52.0 standard drinks of alcohol   • Types: 50 Glasses of wine, 2 Standard drinks or equivalent per week     Social History     Substance and Sexual Activity   Drug Use No     Social History     Tobacco Use   Smoking Status Former   • Packs/day: 1.00   • Years: 10.00   • Total pack years: 10.00   • Types: Cigarettes   • Start date: 1949   • Quit date: 1960   • Years since quittin.7   Smokeless Tobacco Never     Family History   Problem Relation Age of Onset   • Diabetes Mother    • Heart attack Neg Hx    • Stroke Neg Hx    • Aneurysm Neg Hx    • Clotting disorder Neg Hx    • Arrhythmia Neg Hx    • Hypertension Neg Hx    • Hyperlipidemia Neg Hx         pt unsure      No Known Allergies     Constitutional:  There were no vitals taken for this visit. General: NAD. Eyes: Clear sclerae. ENT: No inflammation, lesion, or mass of lips. No tracheal deviation. Musculoskeletal: As mentioned below. Integumentary: No visible rashes or skin lesions. Pulmonary/Chest: Effort normal. No respiratory distress. Neuro: CN's grossly intact, LOPEZ. Psych: Normal affect and judgement. Vascular: WWP.     Left Knee Exam     Other   Erythema: absent  Scars: absent  Sensation: normal  Pulse: present  Swelling: none  Effusion: no effusion present             Large joint arthrocentesis: L knee  Universal including incisions and probable rehabilitation course. The patient understands the likely length of convalescence after surgery; and we have explained the risks, benefits, and alternatives of surgery. Reasonable expectations and potential complications were discussed and acknowledged, including but not limited to infection, bleeding, blood clots, (DVT and/or PE), nerve injury, retear, instability, continued pain and stiffness. It was also explained that there was a chance of failure which may require further management. The patient agreed and understood and wished to proceed.       FINDINGS:     ARTICULAR CARTILAGE LESION(S):  Medial Femoral Condyle: ICRS Grade 3      Size: 1.5 x 2.0 cm  Medial tibial plateau: ICRS Grade 1      Size: 1.5 x 2.0 cm        Lateral Femoral Condyle: ICRS Grade 2      Size: 1.0 x 1.5 cm  Lateral tibial plateau: ICRS Grade 2      Size: 2.0 x 2.5 cm        Patellar surface: ICRS Grade 2      Size: 2.0 x 2.0 cm  Trochlear groove: ICRS Grade 3      Size: 1.0 x 1.5 cm              EXAMINATION UNDER ANESTHESIA:   Extension 0 degrees  Flexion 120 degrees  Lachman Maneuver:  Negative  Anterior Drawer: Negative  Pivot Shift: Negative  Posterior Drawer:  Negative  Varus stability @ 30 degrees: 0  Valgus stability @ 30 degrees: 0  Patellar glide:1 quadrant lateral, 2 quadrant medial          DESCRIPTION OF PROCEDURE: The patient was brought into the Operating Room and placed in supine position. Upon application of General block in the preoperative holding area, the patient underwent General to stabilize the airway. The patient was given the appropriate dose of antibiotics based on body weight. Timeout was utilized to verify the side as the operative side. Both upper extremities were placed in comfortable position. Examination under anesthesia was performed. The nonoperative leg was carefully padded along the heel and peroneal nerve regions and then placed into a well padded well leg mendoza. The  Protocol:  Procedure performed by: (Medical assistant- Tracy Szymanski)  Consent: Verbal consent obtained. Consent given by: patient  Time out: Immediately prior to procedure a "time out" was called to verify the correct patient, procedure, equipment, support staff and site/side marked as required. Timeout called at: 9/21/2023 10:22 AM.  Patient understanding: patient states understanding of the procedure being performed  Site marked: the operative site was marked  Radiology Images displayed and confirmed. If images not available, report reviewed: imaging studies available  Patient identity confirmed: verbally with patient    Supporting Documentation  Indications: joint swelling, diagnostic evaluation and pain   Procedure Details  Location: knee - L knee (Popliteal fossa-Baker's cyst)  Preparation: Patient was prepped and draped in the usual sterile fashion  Needle size: 16 G  Ultrasound guidance: yes  Approach: posterior  Medications administered: 10 mL ropivacaine 0.5 %    Aspirate amount: 5 mL  Aspirate: serous    Patient tolerance: patient tolerated the procedure well with no immediate complications  Dressing:  Sterile dressing applied    Ultrasound evaluation was used to evaluate the popliteal fossa. I was able to find a hypoechoic structure between the medial head of the gastrocnemius and semimembranosus. I was also able to visualize the connection (stalk) of this hypoechoic structure with the knee joint (speech bubble appearance) which makes it consistent with a Baker's cyst.  There was no doppler/color flow in the hypoechoic structure or surrounding region. I visualized the popliteal vasculature, which was far medial to my needle placement/trajectory. No pseudoaneurysm noted. I anesthestized the epidermal layers and leading into the Baker's cyst with 8 cc of ropivacaine. Afterwards, I used a 16 gauge needle to aspirate the cyst for the amount as above. The patient tolerated the procedure well.   A operative leg was then placed into an arthroscopic leg mendoza with a tourniquet applied proximally.  No bump was placed under the hip on the operative side. The operative leg was prepped and draped in a sterile fashion with ChloraPrep material.    We injected Exparel mixture into the anterolateral and anteromedial aspect of the knee with application of 15 mL per portal site. A #11 blade was used to make the arthroscopic portals. Blunt trocar and sheath were inserted into the intercondylar notch and then subsequently into the suprapatellar pouch. This patellofemoral joint was visualized, demonstrating normal lateral patellar tilt, normal patellar subluxation. The patellar tracked midline with flexion and extension. Arthroscopic pictures were obtained. There was articular cartilage damage was present in the patellofemoral compartment as noted in the Findings section of this operative report. The lesion if present was treated with arthroscopic chondroplasty technique removing all irregular edges and flaps of articular cartilage at the lesion.    Attention was turned to the intercondylar notch where the anterior cruciate ligament (ACL) and posterior cruciate ligament (PCL) structures were visualized. Visualization demonstrated an intact ACL and an intact PCL. Probe analysis revealed no signs of occult pathology within the ligamentous structures.     Attention was then turned to the lateral compartment. The patient demonstrated a complex type tear of the body/posterior horn region of the lateral meniscus. Arthroscopic instrumentation was used to carefully remove the tear and removing 50 % of the body/posterior horn region of the lateral meniscus. The remaining meniscus structure laterally was found to be intact. There was articular cartilage damage was present in the lateral compartment as noted in the Findings section of this operative report. The lesion if present was treated witharthroscopic chondroplasty technique  removing all irregular edges and flaps of articular cartilage at the lesion.    Attention was then turned to the medial compartment. The patient demonstrated an intact medial meniscus with probe analysis demonstrating no occult tears or pathology There was articular cartilage damage was present in the medial compartment as noted in the Findings section of this operative report. The lesion if present was treated with arthroscopic chondroplasty technique removing all irregular edges and flaps of articular cartilage at the lesion.    .Arthroscopic loose bodies were removed from the patellofemoral regions of the knee. Loose body approximately 2 x 2cm. Arthroscopic limited synovecomy was perforemd in the patellofemoral compartment. Final arthroscopic pictures were obtained. Fluid was extravasated from the joint. A 4-0 nylon sutures were used to close the arthroscopic portals. We then injected additional 0.5% ropivicaine mixture using 5 ml per   portal site and along then anterior portion of the knee. Amniox arthrocentesis was performed. Xeroform was applied along with application of sterile electrodes proximally and distally, gauze, ABD pads,cast padding, long-leg LELAND hose stocking and cooling unit. No immobilizer was required postoperatively for this patient. The patient was then allowed to recover from anesthesia.  General was removed. The patient was taken to Recovery Room in  Good condition. At the completion case, all instrument and sponge counts were correct.    NOTE: I was present and scrubbed for the entire procedure.    PHYSICAL THERAPY:  The patient should begin physical therapy on postoperative   day #2 and will be advanced to outpatient therapy as soon as   Possible following discharge.  Weight bearing:as tolerated  right leg  Range of Motion:Full normal motion symmetric to opposite side    No CPM required due to procedure performed       Additional exercises to be performed are: to begin quad sets with a  sterile dressing with compressive wrapping was applied afterwards. Risks of this procedure include:    - Risk of bleeding since a needle is involved. - Risk of infection (1/10,000 chance as per recent studies). Signs/symptoms were discussed and they would prompt an urgent evaluation at an emergency department. After going over these risks, we decided that the benefits outweigh the risks and proceeded with the procedure. heel roll and heel slides    Immediate specific exercises should include:   Gait program should include the above stated weightbearing; in addition: active extension with a heel-to-toe gait working on maintaining extension    Discharge summary:  The patient was discharged to home in Good  Follow-up as scheduled preoperatively.    Medication(s): Refer to Discharge Medication List         Resume preoperative diet as tolerated    Activity per outpatient discharge instruction sheet

## 2023-10-13 ENCOUNTER — OCCUPATIONAL HEALTH (OUTPATIENT)
Dept: URGENT CARE | Facility: CLINIC | Age: 38
End: 2023-10-13

## 2023-10-13 DIAGNOSIS — Z13.9 ENCOUNTER FOR SCREENING: Primary | ICD-10-CM

## 2023-10-13 LAB — COLLECTION ONLY: NORMAL

## 2023-10-13 PROCEDURE — 80305 OOH COLLECTION ONLY DRUG SCREEN: ICD-10-PCS | Mod: S$GLB,,, | Performed by: EMERGENCY MEDICINE

## 2023-10-13 PROCEDURE — 80305 DRUG TEST PRSMV DIR OPT OBS: CPT | Mod: S$GLB,,, | Performed by: EMERGENCY MEDICINE

## 2024-02-14 ENCOUNTER — OCCUPATIONAL HEALTH (OUTPATIENT)
Dept: URGENT CARE | Facility: CLINIC | Age: 39
End: 2024-02-14
Payer: COMMERCIAL

## 2024-02-14 PROCEDURE — 80305 DRUG TEST PRSMV DIR OPT OBS: CPT | Mod: S$GLB,,,

## 2024-04-11 ENCOUNTER — OFFICE VISIT (OUTPATIENT)
Dept: URGENT CARE | Facility: CLINIC | Age: 39
End: 2024-04-11
Payer: OTHER MISCELLANEOUS

## 2024-04-11 VITALS
OXYGEN SATURATION: 100 % | SYSTOLIC BLOOD PRESSURE: 138 MMHG | HEART RATE: 84 BPM | DIASTOLIC BLOOD PRESSURE: 88 MMHG | WEIGHT: 242 LBS | TEMPERATURE: 98 F | RESPIRATION RATE: 18 BRPM | HEIGHT: 67 IN | BODY MASS INDEX: 37.98 KG/M2

## 2024-04-11 DIAGNOSIS — M54.9 DORSALGIA, UNSPECIFIED: ICD-10-CM

## 2024-04-11 DIAGNOSIS — M54.41 ACUTE RIGHT-SIDED LOW BACK PAIN WITH RIGHT-SIDED SCIATICA: Primary | ICD-10-CM

## 2024-04-11 PROCEDURE — 72100 X-RAY EXAM L-S SPINE 2/3 VWS: CPT | Mod: S$GLB,,, | Performed by: RADIOLOGY

## 2024-04-11 PROCEDURE — 96372 THER/PROPH/DIAG INJ SC/IM: CPT | Mod: S$GLB,,, | Performed by: NURSE PRACTITIONER

## 2024-04-11 PROCEDURE — 99204 OFFICE O/P NEW MOD 45 MIN: CPT | Mod: 25,S$GLB,, | Performed by: NURSE PRACTITIONER

## 2024-04-11 RX ORDER — NAPROXEN 500 MG/1
500 TABLET ORAL 2 TIMES DAILY PRN
Qty: 14 TABLET | Refills: 0 | Status: SHIPPED | OUTPATIENT
Start: 2024-04-11 | End: 2024-04-18

## 2024-04-11 RX ORDER — CYCLOBENZAPRINE HCL 10 MG
10 TABLET ORAL NIGHTLY PRN
Qty: 7 TABLET | Refills: 0 | Status: SHIPPED | OUTPATIENT
Start: 2024-04-11 | End: 2024-04-18

## 2024-04-11 RX ORDER — KETOROLAC TROMETHAMINE 30 MG/ML
30 INJECTION, SOLUTION INTRAMUSCULAR; INTRAVENOUS
Status: COMPLETED | OUTPATIENT
Start: 2024-04-11 | End: 2024-04-11

## 2024-04-11 RX ORDER — TEMAZEPAM 7.5 MG/1
15 CAPSULE ORAL NIGHTLY PRN
COMMUNITY

## 2024-04-11 RX ORDER — DEXAMETHASONE SODIUM PHOSPHATE 4 MG/ML
8 INJECTION, SOLUTION INTRA-ARTICULAR; INTRALESIONAL; INTRAMUSCULAR; INTRAVENOUS; SOFT TISSUE
Status: COMPLETED | OUTPATIENT
Start: 2024-04-11 | End: 2024-04-11

## 2024-04-11 RX ORDER — CARIPRAZINE 3 MG/1
3 CAPSULE, GELATIN COATED ORAL
COMMUNITY

## 2024-04-11 RX ADMIN — KETOROLAC TROMETHAMINE 30 MG: 30 INJECTION, SOLUTION INTRAMUSCULAR; INTRAVENOUS at 07:04

## 2024-04-11 RX ADMIN — DEXAMETHASONE SODIUM PHOSPHATE 8 MG: 4 INJECTION, SOLUTION INTRA-ARTICULAR; INTRALESIONAL; INTRAMUSCULAR; INTRAVENOUS; SOFT TISSUE at 07:04

## 2024-04-11 NOTE — LETTER
[facility]  [address] [address]  Phone: [phone]  [place] [employer]: [email or web address]    Pt Name: Jojo Gould Date: 04/11/2024   Employee ID:  Date of First Treatment: 04/11/2024   Company: [employer]      Appointment Time: 05:35 PM Arrived: 6:30   Provider: RUBIN Nogueira Jr Time Out: 8:01     Office Treatment:   1. Acute right-sided low back pain with right-sided sciatica    2. Dorsalgia, unspecified      Medications Ordered This Encounter   Medications    cyclobenzaprine (FLEXERIL) 10 MG tablet    dexAMETHasone injection 8 mg    ketorolac injection 30 mg    naproxen (NAPROSYN) 500 MG tablet      Patient Instructions: Attention not to aggravate affected area, Apply ice 24-48 hours then apply heat/warm soaks, MRI to be scheduled once authorized    Restrictions: No driving company vehicles     Return Appointment: 04/18/2024

## 2024-04-11 NOTE — PROGRESS NOTES
"Subjective:      Patient ID: Jojo Boateng is a 38 y.o. female.    Vitals:  height is 5' 7" (1.702 m) and weight is 109.8 kg (242 lb). Her oral temperature is 98.3 °F (36.8 °C). Her blood pressure is 138/88 and her pulse is 84. Her respiration is 18 and oxygen saturation is 100%.     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 The pain is now traveling down her R leg."      Constitution: Negative for chills and fever.   Neck: Negative for neck pain.   Cardiovascular:  Negative for chest pain, palpitations and sob on exertion.   Respiratory:  Negative for shortness of breath.    Gastrointestinal:  Negative for nausea and vomiting.   Genitourinary:  Negative for bladder incontinence.   Musculoskeletal:  Positive for pain and back pain.   Skin:  Negative for rash, erythema and bruising.   Neurological:  Negative for dizziness, light-headedness, passing out, disorientation, altered mental status, numbness and tingling.   Psychiatric/Behavioral:  Negative for altered mental status, disorientation and confusion.       Objective:     Physical Exam   Constitutional: She is oriented to person, place, and time. She appears well-developed. She is cooperative.  Non-toxic appearance. She does not appear ill. No distress.   HENT:   Head: Normocephalic and atraumatic.   Ears:   Right Ear: External ear normal.   Left Ear: External ear normal.   Nose: Nose normal.   Mouth/Throat: Oropharynx is clear and moist and mucous membranes are normal. Mucous membranes are moist. Oropharynx is clear.   Eyes: Conjunctivae and lids are normal. No scleral icterus.   Neck: Trachea normal and phonation normal. Neck supple.   Cardiovascular: Normal rate, regular rhythm, normal heart sounds and normal pulses.   Pulses:       Dorsalis pedis pulses are 2+ on the right side.      Comments: Left foot pink warm and dry.  " Capillary refill less than 3 seconds to all toes.  Full motion and sensation   Pulmonary/Chest: Effort normal and breath sounds normal. No stridor. No respiratory distress.   Abdominal: Normal appearance.   Musculoskeletal:         General: No deformity.      Lumbar back: She exhibits decreased range of motion, tenderness and bony tenderness. She exhibits no swelling, no edema, no deformity, no laceration and no spasm.        Back:    Neurological: no focal deficit. She is alert and oriented to person, place, and time. She has normal sensation, normal strength, normal reflexes and intact cranial nerves (2-12). She displays no weakness. No sensory deficit. She exhibits normal muscle tone. Gait (limping on right leg) abnormal. Coordination normal. GCS eye subscore is 4. GCS verbal subscore is 5. GCS motor subscore is 6.   Skin: Skin is warm, dry, intact and not diaphoretic. Capillary refill takes 2 to 3 seconds. No erythema   Psychiatric: Her speech is normal and behavior is normal. Judgment and thought content normal.   Nursing note and vitals reviewed.      Assessment:     1. Acute right-sided low back pain with right-sided sciatica    2. Dorsalgia, unspecified        Plan:       Acute right-sided low back pain with right-sided sciatica  -     dexAMETHasone injection 8 mg  -     ketorolac injection 30 mg  -     XR LUMBAR SPINE 2 OR 3 VIEWS; Future; Expected date: 04/11/2024  -     MRI Lumbar Spine Without Contrast; Future; Expected date: 04/11/2024  -     naproxen (NAPROSYN) 500 MG tablet; Take 1 tablet (500 mg total) by mouth 2 (two) times daily as needed (pain).  Dispense: 14 tablet; Refill: 0  -     cyclobenzaprine (FLEXERIL) 10 MG tablet; Take 1 tablet (10 mg total) by mouth nightly as needed for Muscle spasms.  Dispense: 7 tablet; Refill: 0    Dorsalgia, unspecified  -     MRI Lumbar Spine Without Contrast; Future; Expected date: 04/11/2024        I have reviewed the x-ray films and discussed the results and  physical exam findings were discussed with the patient and all questions answered.  I advised her that someone will call her with the final x-ray over-read if there were any acute changes.  At this time I see no obvious spinal fractures or dislocation.  We discussed symptom monitoring, conservative care methods, medication use, and follow up orders. she verbalized understanding and agreement with the plan of care.

## 2024-04-12 NOTE — PATIENT INSTRUCTIONS
Increase clear fluid intake  May apply ice to affected area for 15 minutes every 2 hours.  After 48 hours may progress to moist heat or heating pad.  Take care not to fall sleep on heating pad as this may cause severe burns  Rest until better  Take naproxen as directed.  Take each dose with a full meal.  Do not take any additional NSAIDs while taking naproxen.  You may take additional Tylenol as needed between doses    You may take Flexeril at bedtime as needed for muscle spasms and back pain  Return to work with restrictions as noted  Follow up with the MRI once scheduled  Go directly to the emergency room if you develop any numbness or tingling to your lower extremities  Return to this clinic April 18, 2024 for re-evaluation  Return to clinic for new or worse symptoms

## 2024-04-18 ENCOUNTER — OFFICE VISIT (OUTPATIENT)
Dept: URGENT CARE | Facility: CLINIC | Age: 39
End: 2024-04-18
Payer: COMMERCIAL

## 2024-04-18 VITALS
DIASTOLIC BLOOD PRESSURE: 71 MMHG | HEIGHT: 64 IN | SYSTOLIC BLOOD PRESSURE: 131 MMHG | HEART RATE: 71 BPM | WEIGHT: 240 LBS | RESPIRATION RATE: 18 BRPM | OXYGEN SATURATION: 98 % | BODY MASS INDEX: 40.97 KG/M2 | TEMPERATURE: 98 F

## 2024-04-18 DIAGNOSIS — M54.41 ACUTE RIGHT-SIDED LOW BACK PAIN WITH RIGHT-SIDED SCIATICA: ICD-10-CM

## 2024-04-18 DIAGNOSIS — Z02.6 ENCOUNTER RELATED TO WORKER'S COMPENSATION CLAIM: Primary | ICD-10-CM

## 2024-04-18 PROCEDURE — 99213 OFFICE O/P EST LOW 20 MIN: CPT | Mod: S$GLB,,,

## 2024-04-18 NOTE — PROGRESS NOTES
"Subjective:      Patient ID: Jojo Boateng is a 38 y.o. female.    Vitals:  height is 5' 4" (1.626 m) and weight is 108.9 kg (240 lb). Her oral temperature is 98.3 °F (36.8 °C). Her blood pressure is 131/71 and her pulse is 71. Her respiration is 18 and oxygen saturation is 98%.     Chief Complaint: Back Pain (Pt works for [employer]. Lifted a stretcher. When doing so felt a " pop",states pain is ) are ready to RTW )    W/C F/U: DOI: 4/11/24: Patient reports back pain has subsided completely.  She was able demonstrate full range of motion with no pain.  She was ready to return to work with no restrictions.     Back Pain  This is a new problem. The current episode started in the past 7 days. The problem has been resolved since onset. The pain is present in the lumbar spine. The pain is at a severity of 0/10. Pertinent negatives include no fever.       Constitution: Negative for chills, fatigue and fever.   HENT: Negative.     Neck: neck negative.   Cardiovascular: Negative.    Respiratory: Negative.     Gastrointestinal: Negative.    Musculoskeletal:  Negative for back pain (resolved).   Skin: Negative.    Neurological: Negative.    Psychiatric/Behavioral: Negative.        Objective:     Physical Exam   Constitutional: She is oriented to person, place, and time. She appears well-developed. She is cooperative. No distress.   HENT:   Head: Normocephalic and atraumatic.   Ears:   Right Ear: External ear normal.   Left Ear: External ear normal.   Nose: Nose normal.   Mouth/Throat: Oropharynx is clear and moist and mucous membranes are normal.   Eyes: Conjunctivae and lids are normal.   Neck: Trachea normal and phonation normal. Neck supple.   Cardiovascular: Normal rate.   Pulmonary/Chest: Effort normal. No respiratory distress.   Abdominal: Normal appearance and bowel sounds are normal. She exhibits no mass. Soft.   Musculoskeletal: Normal range of motion.         General: Normal range of motion.   Neurological: She is " alert and oriented to person, place, and time. She has normal strength and normal reflexes. She displays no weakness.   Skin: Skin is warm, dry, intact and not diaphoretic.   Psychiatric: Her speech is normal and behavior is normal. Mood, judgment and thought content normal.   Nursing note and vitals reviewed.      Assessment:     1. Encounter related to worker's compensation claim    2. Acute right-sided low back pain with right-sided sciatica        Plan:       Encounter related to worker's compensation claim    Acute right-sided low back pain with right-sided sciatica      Patient cleared to return to work full duty.  Follow up only if needed.

## 2024-12-19 ENCOUNTER — OFFICE VISIT (OUTPATIENT)
Dept: URGENT CARE | Facility: CLINIC | Age: 39
End: 2024-12-19
Payer: COMMERCIAL

## 2024-12-19 VITALS
HEART RATE: 87 BPM | TEMPERATURE: 98 F | SYSTOLIC BLOOD PRESSURE: 141 MMHG | BODY MASS INDEX: 40.97 KG/M2 | RESPIRATION RATE: 18 BRPM | HEIGHT: 64 IN | DIASTOLIC BLOOD PRESSURE: 90 MMHG | OXYGEN SATURATION: 100 % | WEIGHT: 240 LBS

## 2024-12-19 DIAGNOSIS — B96.89 ACUTE BACTERIAL SINUSITIS: ICD-10-CM

## 2024-12-19 DIAGNOSIS — J02.9 SORE THROAT: ICD-10-CM

## 2024-12-19 DIAGNOSIS — R50.9 FEVER, UNSPECIFIED FEVER CAUSE: Primary | ICD-10-CM

## 2024-12-19 DIAGNOSIS — J01.90 ACUTE BACTERIAL SINUSITIS: ICD-10-CM

## 2024-12-19 DIAGNOSIS — J40 BRONCHITIS: ICD-10-CM

## 2024-12-19 DIAGNOSIS — R05.9 COUGH, UNSPECIFIED TYPE: ICD-10-CM

## 2024-12-19 DIAGNOSIS — J04.0 ACUTE LARYNGITIS: ICD-10-CM

## 2024-12-19 LAB
CTP QC/QA: YES
FLUAV AG NPH QL: NEGATIVE
FLUBV AG NPH QL: NEGATIVE
S PYO RRNA THROAT QL PROBE: NEGATIVE
SARS-COV-2 AG RESP QL IA.RAPID: NEGATIVE

## 2024-12-19 RX ORDER — ALBUTEROL SULFATE 90 UG/1
1-2 INHALANT RESPIRATORY (INHALATION) EVERY 6 HOURS PRN
Qty: 18 G | Refills: 0 | Status: SHIPPED | OUTPATIENT
Start: 2024-12-19

## 2024-12-19 RX ORDER — AMOXICILLIN AND CLAVULANATE POTASSIUM 875; 125 MG/1; MG/1
1 TABLET, FILM COATED ORAL EVERY 12 HOURS
Qty: 20 TABLET | Refills: 0 | Status: SHIPPED | OUTPATIENT
Start: 2024-12-19 | End: 2024-12-29

## 2024-12-19 RX ORDER — AZELASTINE HYDROCHLORIDE, FLUTICASONE PROPIONATE 137; 50 UG/1; UG/1
1 SPRAY, METERED NASAL 2 TIMES DAILY
Qty: 23 G | Refills: 0 | Status: SHIPPED | OUTPATIENT
Start: 2024-12-19

## 2024-12-19 RX ORDER — BUPROPION HYDROCHLORIDE 300 MG/1
TABLET ORAL
COMMUNITY
Start: 2024-11-20

## 2024-12-19 RX ORDER — PREDNISONE 20 MG/1
40 TABLET ORAL DAILY
Qty: 10 TABLET | Refills: 0 | Status: SHIPPED | OUTPATIENT
Start: 2024-12-19 | End: 2024-12-24

## 2024-12-19 RX ORDER — BROMPHENIRAMINE MALEATE, PSEUDOEPHEDRINE HYDROCHLORIDE, AND DEXTROMETHORPHAN HYDROBROMIDE 2; 30; 10 MG/5ML; MG/5ML; MG/5ML
5 SYRUP ORAL
Qty: 118 ML | Refills: 0 | Status: SHIPPED | OUTPATIENT
Start: 2024-12-19 | End: 2024-12-29

## 2024-12-19 RX ORDER — LEVOCETIRIZINE DIHYDROCHLORIDE 5 MG/1
5 TABLET, FILM COATED ORAL NIGHTLY
Qty: 30 TABLET | Refills: 0 | Status: SHIPPED | OUTPATIENT
Start: 2024-12-19

## 2024-12-19 NOTE — PROGRESS NOTES
"Subjective:      Patient ID: Jojo Boateng is a 39 y.o. female.    Vitals:  height is 5' 4" (1.626 m) and weight is 108.9 kg (240 lb). Her oral temperature is 98.4 °F (36.9 °C). Her blood pressure is 141/90 (abnormal) and her pulse is 87. Her respiration is 18 and oxygen saturation is 100%.     Chief Complaint: Sore Throat and Otalgia    Patient is a 39-year-old female with a past medical history of hypertension, anxiety, depression and bipolar disorder who presents to clinic for evaluation of URI related symptoms.  Patient reports symptoms times 2-3 days.  Patient reports worse this morning.  Patient reports possible sick exposure as works [employer].  Patient reports she is vaccinated.  Patient reports over-the-counter Tylenol, cold and flu medicine and breathing treatment with only mild relief to symptoms.    Sore Throat   This is a new problem. The current episode started yesterday. The problem has been unchanged. The maximum temperature recorded prior to her arrival was 101 - 101.9 F. Associated symptoms include congestion, coughing, ear pain and headaches. Pertinent negatives include no abdominal pain, diarrhea, ear discharge, shortness of breath or vomiting.   Otalgia   There is pain in both ears. This is a new problem. The current episode started yesterday. The problem has been unchanged. The maximum temperature recorded prior to her arrival was 101 - 101.9 F. Associated symptoms include coughing, headaches and a sore throat. Pertinent negatives include no abdominal pain, diarrhea, ear discharge, hearing loss, rash or vomiting.       Constitution: Positive for chills, fatigue and fever (Temperature max 101F).   HENT:  Positive for ear pain, congestion, postnasal drip, sore throat and voice change. Negative for ear discharge, tinnitus and hearing loss.    Neck: neck negative.   Cardiovascular: Negative.  Negative for chest pain and palpitations.   Eyes: Negative.    Respiratory:  Positive for cough and sputum " production. Negative for chest tightness and shortness of breath.    Gastrointestinal: Negative.  Negative for abdominal pain, nausea, vomiting and diarrhea.   Endocrine: negative.   Genitourinary: Negative.  Negative for dysuria, frequency and urgency.   Musculoskeletal: Negative.  Negative for muscle ache.   Skin: Negative.  Negative for color change, pale, rash and erythema.   Allergic/Immunologic: Negative.    Neurological:  Positive for headaches. Negative for dizziness, light-headedness, passing out, disorientation and altered mental status.   Hematologic/Lymphatic: Negative.    Psychiatric/Behavioral: Negative.  Negative for altered mental status, disorientation and confusion.       Objective:     Physical Exam   Constitutional: She is oriented to person, place, and time. She appears well-developed. She is cooperative.  Non-toxic appearance. She appears ill. No distress.      Comments:Speaks in a soft hoarse voice     HENT:   Head: Normocephalic and atraumatic.   Ears:   Right Ear: Hearing, tympanic membrane, external ear and ear canal normal.   Left Ear: Hearing, tympanic membrane, external ear and ear canal normal.   Nose: Congestion present. No mucosal edema, rhinorrhea or nasal deformity. No epistaxis. Right sinus exhibits maxillary sinus tenderness. Right sinus exhibits no frontal sinus tenderness. Left sinus exhibits maxillary sinus tenderness. Left sinus exhibits no frontal sinus tenderness.   Mouth/Throat: Uvula is midline and mucous membranes are normal. Mucous membranes are moist. No trismus in the jaw. Normal dentition. No uvula swelling. Posterior oropharyngeal erythema present. No oropharyngeal exudate. Oropharynx is clear.   Eyes: Conjunctivae and lids are normal. Pupils are equal, round, and reactive to light. Right eye exhibits no discharge. Left eye exhibits no discharge. No scleral icterus.   Neck: Trachea normal and phonation normal. Neck supple. No neck rigidity present.   Cardiovascular:  Normal rate, regular rhythm, normal heart sounds and normal pulses.   Pulmonary/Chest: Effort normal and breath sounds normal. No respiratory distress (Unlabored.  Equal rise and fall of chest.  Able speak in full complete sentences.  No adventitious breath sounds noted.). She has no wheezes. She has no rhonchi. She has no rales.   Abdominal: Normal appearance and bowel sounds are normal. She exhibits no distension. Soft. There is no abdominal tenderness. There is no rebound, no guarding, no left CVA tenderness and no right CVA tenderness.   Musculoskeletal: Normal range of motion.         General: Normal range of motion.      Cervical back: She exhibits no tenderness.   Lymphadenopathy:     She has no cervical adenopathy.   Neurological: She is alert and oriented to person, place, and time. She exhibits normal muscle tone.   Skin: Skin is warm, dry, intact, not diaphoretic, not pale and no rash. Capillary refill takes less than 2 seconds. No erythema   Psychiatric: Her speech is normal and behavior is normal. Judgment and thought content normal.   Nursing note and vitals reviewed.      Assessment:     1. Fever, unspecified fever cause    2. Sore throat    3. Cough, unspecified type    4. Bronchitis    5. Acute bacterial sinusitis    6. Acute laryngitis        Plan:       Fever, unspecified fever cause  -     XR CHEST PA AND LATERAL; Future; Expected date: 12/19/2024  -     POCT rapid strep A  -     SARS Coronavirus 2 Antigen, POCT Manual Read  -     POCT Influenza A/B Rapid Antigen    Sore throat  -     POCT rapid strep A  -     SARS Coronavirus 2 Antigen, POCT Manual Read  -     POCT Influenza A/B Rapid Antigen    Cough, unspecified type  -     XR CHEST PA AND LATERAL; Future; Expected date: 12/19/2024  -     SARS Coronavirus 2 Antigen, POCT Manual Read  -     POCT Influenza A/B Rapid Antigen    Bronchitis    Acute bacterial sinusitis    Acute laryngitis    Other orders  -     amoxicillin-clavulanate 875-125mg  (AUGMENTIN) 875-125 mg per tablet; Take 1 tablet by mouth every 12 (twelve) hours. for 10 days  Dispense: 20 tablet; Refill: 0  -     predniSONE (DELTASONE) 20 MG tablet; Take 2 tablets (40 mg total) by mouth once daily. for 5 days  Dispense: 10 tablet; Refill: 0  -     brompheniramine-pseudoeph-DM (BROMFED DM) 2-30-10 mg/5 mL Syrp; Take 5 mLs by mouth every 4 to 6 hours as needed (Cough).  Dispense: 118 mL; Refill: 0  -     albuterol (VENTOLIN HFA) 90 mcg/actuation inhaler; Inhale 1-2 puffs into the lungs every 6 (six) hours as needed for Wheezing or Shortness of Breath. Rescue  Dispense: 18 g; Refill: 0  -     azelastine-fluticasone (DYMISTA) 137-50 mcg/spray Spry nassal spray; 1 spray by Each Nostril route 2 (two) times daily.  Dispense: 23 g; Refill: 0  -     levocetirizine (XYZAL) 5 MG tablet; Take 1 tablet (5 mg total) by mouth every evening.  Dispense: 30 tablet; Refill: 0                Labs: Influenza a and B negative.  COVID negative.  Rapid strep negative.    Chest x-ray: The lungs are clear, with normal appearance of pulmonary vasculature and no pleural effusion or pneumothorax. The cardiac silhouette is normal in size. The hilar and mediastinal contours are unremarkable. Bones are intact.  Take medications as prescribed.  Tylenol/Motrin per package instructions for any pain or fever.  Assure adequate hydration and rest.  Throat lozenges or Chloraseptic per package instructions for sore throat.    Warm salt water gargles every 2-3 hours as needed for sore throat.    Nasal saline flushes or irrigation as directed for nasal saline congestion and sinus related symptoms.  Follow-up with PCP in 1-2 days.  Return to clinic as needed.  To ED for any new or acutely worsening symptoms.  Patient in agreement with plan of care.  Work excuse provided.      DISCLAIMER: Please note that my documentation in this Electronic Healthcare Record was produced using speech recognition software and therefore may contain errors  related to that software system.These could include grammar, punctuation and spelling errors or the inclusion/exclusion of phrases that were not intended. Garbled syntax, mangled pronouns, and other bizarre constructions may be attributed to that software system.

## 2024-12-19 NOTE — LETTER
December 19, 2024      [facility] [place]  [address]  [address]  [place] [address]  Phone: [phone]  Fax: [phone]       Patient: Jojo Boateng   YOB: 1985  Date of Visit: 12/19/2024    To Whom It May Concern:    Aranza Boateng  was at [facility] on 12/19/2024. The patient may return to work/school on 12/22/2024 with no restrictions. If you have any questions or concerns, or if I can be of further assistance, please do not hesitate to contact me.    Sincerely,    Joaquin Curry NP

## (undated) DEVICE — GLOVE BIOGEL SKINSENSE PI 7.0

## (undated) DEVICE — UNDERGLOVE BIOGEL PI MICRO BLUE SZ 7.5

## (undated) DEVICE — GLOVE BIOGEL MICRO SURGEON PINK SZ 7

## (undated) DEVICE — DRESSING XEROFORM FOIL PK 1X8

## (undated) DEVICE — TROCAR ENDOPATH XCEL BLADELESS B5LT

## (undated) DEVICE — Device

## (undated) DEVICE — APPLICATOR CHLORAPREP ORN 26ML

## (undated) DEVICE — DERMABOND HVD MINI  DHVM12

## (undated) DEVICE — SOLUTION IRRI NS BOTTLE 1000ML R5200-01

## (undated) DEVICE — APPLIER CLIP  5MM

## (undated) DEVICE — CONTAINER SPECIMEN STRL 4OZ

## (undated) DEVICE — SOLUTION IRRI H2O BOTTLE 1000ML

## (undated) DEVICE — UNDERGLOVES BIOGEL PI SZ 7 LF

## (undated) DEVICE — SEE MEDLINE ITEM 157169

## (undated) DEVICE — TROCAR OPTICAL ZTHREAD 12MMX100MM CTF73

## (undated) DEVICE — TRAY GENERAL LAPAROSCOPY

## (undated) DEVICE — DRAPE STERI INSTRUMENT 1018

## (undated) DEVICE — SCISSORS 5MM APPLIED MEDICAL   CB030

## (undated) DEVICE — PAD COLD THERAPY KNEE WRAP ON

## (undated) DEVICE — PAD CAST SPECIALIST STRL 6

## (undated) DEVICE — ELECTRODE 90 DEGREE ANGLE

## (undated) DEVICE — TUBE SET INFLOW/OUTFLOW

## (undated) DEVICE — SOL 9P NACL IRR PIC IL

## (undated) DEVICE — PAD ABD 8X10 STERILE

## (undated) DEVICE — SYR 30CC LUER LOCK

## (undated) DEVICE — UNDERGLOVES BIOGEL PI SIZE 7.5

## (undated) DEVICE — UNDERGLOVES BIOGEL PI SIZE 8.5

## (undated) DEVICE — NEEDLE INSUFFLATION 120MM 172015

## (undated) DEVICE — SUTURE VICRYL #0 27 UR-6 VCP603H

## (undated) DEVICE — NDL HYPO SAFETY 22 X 1 1/2

## (undated) DEVICE — GLOVE BIOGEL SKINSENSE PI 8.5

## (undated) DEVICE — SHAVER SYS 5.5 ULRAFRR

## (undated) DEVICE — SYRINGE HYPODERMC LL 22G 1.5 ECLIPSE 30

## (undated) DEVICE — NEEDLE HYPODERMIC ALUM HUB 22G

## (undated) DEVICE — GAUZE SPONGE 4X4 12PLY

## (undated) DEVICE — TROCAR BLADED ZTHREAD 11MM X 100MM CTB33

## (undated) DEVICE — SEE MEDLINE ITEM 157150

## (undated) DEVICE — SOL IRR NACL .9% 3000ML

## (undated) DEVICE — SLEEVE TROCAR ENDOPATH XCEL

## (undated) DEVICE — PAD ELECTRODE STER 1.5X3

## (undated) DEVICE — SUT ETHILON 4-0 BLK MONO

## (undated) DEVICE — SUTURE MONOCRYL 4-0 PS-2 27 MCP426H

## (undated) DEVICE — GOWN B1 X-LG X-LONG

## (undated) DEVICE — SHAVER ULTRAFFR 4.2MM

## (undated) DEVICE — GOWN SURG. AERO CHROME XXL

## (undated) DEVICE — CABLE MONOPOLAR 10FT DISPOSABLE

## (undated) DEVICE — TOURNIQUET SB QC SP 34X4IN

## (undated) DEVICE — DRAPE SURG W/TWL 17 5/8X23

## (undated) DEVICE — PAD BOVIE ADULT